# Patient Record
Sex: MALE | Race: WHITE | NOT HISPANIC OR LATINO | Employment: FULL TIME | URBAN - METROPOLITAN AREA
[De-identification: names, ages, dates, MRNs, and addresses within clinical notes are randomized per-mention and may not be internally consistent; named-entity substitution may affect disease eponyms.]

---

## 2017-01-09 ENCOUNTER — GENERIC CONVERSION - ENCOUNTER (OUTPATIENT)
Dept: OTHER | Facility: OTHER | Age: 62
End: 2017-01-09

## 2017-01-10 LAB — PROSTATE SPECIFIC ANTIGEN (HISTORICAL): 2.6 NG/ML (ref 0–4)

## 2017-02-13 ENCOUNTER — ALLSCRIPTS OFFICE VISIT (OUTPATIENT)
Dept: OTHER | Facility: OTHER | Age: 62
End: 2017-02-13

## 2017-02-15 ENCOUNTER — ANESTHESIA EVENT (OUTPATIENT)
Dept: GASTROENTEROLOGY | Facility: AMBULARY SURGERY CENTER | Age: 62
End: 2017-02-15
Payer: COMMERCIAL

## 2017-02-15 RX ORDER — ATORVASTATIN CALCIUM 40 MG/1
40 TABLET, FILM COATED ORAL
COMMUNITY
End: 2021-03-25 | Stop reason: HOSPADM

## 2017-02-15 RX ORDER — LANOLIN ALCOHOL/MO/W.PET/CERES
1 CREAM (GRAM) TOPICAL EVERY MORNING
COMMUNITY
End: 2018-10-08

## 2017-02-15 RX ORDER — MULTIVIT-MIN/IRON/FOLIC ACID/K 18-600-40
2000 CAPSULE ORAL
COMMUNITY

## 2017-02-15 RX ORDER — LANSOPRAZOLE 30 MG/1
30 CAPSULE, DELAYED RELEASE ORAL EVERY MORNING
COMMUNITY

## 2017-02-15 RX ORDER — LATANOPROST 50 UG/ML
1 SOLUTION/ DROPS OPHTHALMIC
COMMUNITY
End: 2019-05-07

## 2017-02-15 RX ORDER — LOSARTAN POTASSIUM 50 MG/1
50 TABLET ORAL EVERY MORNING
COMMUNITY
End: 2021-03-25 | Stop reason: HOSPADM

## 2017-02-16 ENCOUNTER — ANESTHESIA (OUTPATIENT)
Dept: GASTROENTEROLOGY | Facility: AMBULARY SURGERY CENTER | Age: 62
End: 2017-02-16
Payer: COMMERCIAL

## 2017-02-16 ENCOUNTER — HOSPITAL ENCOUNTER (OUTPATIENT)
Facility: AMBULARY SURGERY CENTER | Age: 62
Setting detail: OUTPATIENT SURGERY
Discharge: HOME | End: 2017-02-16
Attending: INTERNAL MEDICINE | Admitting: INTERNAL MEDICINE
Payer: COMMERCIAL

## 2017-02-16 VITALS
HEART RATE: 69 BPM | DIASTOLIC BLOOD PRESSURE: 85 MMHG | TEMPERATURE: 97.2 F | SYSTOLIC BLOOD PRESSURE: 165 MMHG | OXYGEN SATURATION: 96 % | HEIGHT: 66 IN | RESPIRATION RATE: 18 BRPM | BODY MASS INDEX: 28.93 KG/M2 | WEIGHT: 180 LBS

## 2017-02-16 DIAGNOSIS — Z86.010 HISTORY OF COLONIC POLYPS: ICD-10-CM

## 2017-02-16 DIAGNOSIS — K22.70 BARRETT'S ESOPHAGUS WITHOUT DYSPLASIA: ICD-10-CM

## 2017-02-16 LAB — GLUCOSE SERPL-MCNC: 107 MG/DL (ref 65–140)

## 2017-02-16 PROCEDURE — 82948 REAGENT STRIP/BLOOD GLUCOSE: CPT

## 2017-02-16 PROCEDURE — 88305 TISSUE EXAM BY PATHOLOGIST: CPT | Performed by: INTERNAL MEDICINE

## 2017-02-16 RX ORDER — SODIUM CHLORIDE, SODIUM LACTATE, POTASSIUM CHLORIDE, CALCIUM CHLORIDE 600; 310; 30; 20 MG/100ML; MG/100ML; MG/100ML; MG/100ML
100 INJECTION, SOLUTION INTRAVENOUS CONTINUOUS
Status: DISCONTINUED | OUTPATIENT
Start: 2017-02-16 | End: 2017-02-16 | Stop reason: HOSPADM

## 2017-02-16 RX ORDER — LABETALOL HYDROCHLORIDE 5 MG/ML
INJECTION, SOLUTION INTRAVENOUS AS NEEDED
Status: DISCONTINUED | OUTPATIENT
Start: 2017-02-16 | End: 2017-02-16 | Stop reason: SURG

## 2017-02-16 RX ORDER — PROPOFOL 10 MG/ML
INJECTION, EMULSION INTRAVENOUS AS NEEDED
Status: DISCONTINUED | OUTPATIENT
Start: 2017-02-16 | End: 2017-02-16 | Stop reason: SURG

## 2017-02-16 RX ORDER — LIDOCAINE HYDROCHLORIDE 20 MG/ML
INJECTION, SOLUTION EPIDURAL; INFILTRATION; INTRACAUDAL; PERINEURAL AS NEEDED
Status: DISCONTINUED | OUTPATIENT
Start: 2017-02-16 | End: 2017-02-16 | Stop reason: SURG

## 2017-02-16 RX ADMIN — PROPOFOL 40 MG: 10 INJECTION, EMULSION INTRAVENOUS at 11:22

## 2017-02-16 RX ADMIN — PROPOFOL 20 MG: 10 INJECTION, EMULSION INTRAVENOUS at 11:46

## 2017-02-16 RX ADMIN — PROPOFOL 20 MG: 10 INJECTION, EMULSION INTRAVENOUS at 11:44

## 2017-02-16 RX ADMIN — PROPOFOL 20 MG: 10 INJECTION, EMULSION INTRAVENOUS at 11:42

## 2017-02-16 RX ADMIN — PROPOFOL 20 MG: 10 INJECTION, EMULSION INTRAVENOUS at 11:40

## 2017-02-16 RX ADMIN — LABETALOL HYDROCHLORIDE 10 MG: 5 INJECTION, SOLUTION INTRAVENOUS at 11:24

## 2017-02-16 RX ADMIN — PROPOFOL 40 MG: 10 INJECTION, EMULSION INTRAVENOUS at 11:32

## 2017-02-16 RX ADMIN — PROPOFOL 20 MG: 10 INJECTION, EMULSION INTRAVENOUS at 11:36

## 2017-02-16 RX ADMIN — PROPOFOL 120 MG: 10 INJECTION, EMULSION INTRAVENOUS at 11:19

## 2017-02-16 RX ADMIN — SODIUM CHLORIDE, SODIUM LACTATE, POTASSIUM CHLORIDE, AND CALCIUM CHLORIDE: .6; .31; .03; .02 INJECTION, SOLUTION INTRAVENOUS at 11:14

## 2017-02-16 RX ADMIN — SODIUM CHLORIDE, SODIUM LACTATE, POTASSIUM CHLORIDE, AND CALCIUM CHLORIDE 100 ML/HR: .6; .31; .03; .02 INJECTION, SOLUTION INTRAVENOUS at 10:56

## 2017-02-16 RX ADMIN — LIDOCAINE HYDROCHLORIDE 40 MG: 20 INJECTION, SOLUTION EPIDURAL; INFILTRATION; INTRACAUDAL; PERINEURAL at 11:19

## 2017-02-16 RX ADMIN — PROPOFOL 20 MG: 10 INJECTION, EMULSION INTRAVENOUS at 11:34

## 2017-02-16 RX ADMIN — PROPOFOL 40 MG: 10 INJECTION, EMULSION INTRAVENOUS at 11:29

## 2017-02-16 RX ADMIN — PROPOFOL 40 MG: 10 INJECTION, EMULSION INTRAVENOUS at 11:26

## 2017-02-16 RX ADMIN — PROPOFOL 40 MG: 10 INJECTION, EMULSION INTRAVENOUS at 11:24

## 2017-02-16 RX ADMIN — PROPOFOL 20 MG: 10 INJECTION, EMULSION INTRAVENOUS at 11:38

## 2017-04-24 ENCOUNTER — TRANSCRIBE ORDERS (OUTPATIENT)
Dept: ADMINISTRATIVE | Facility: HOSPITAL | Age: 62
End: 2017-04-24

## 2017-04-24 DIAGNOSIS — S64.21XD: Primary | ICD-10-CM

## 2017-05-05 ENCOUNTER — HOSPITAL ENCOUNTER (OUTPATIENT)
Dept: NEUROLOGY | Facility: HOSPITAL | Age: 62
Discharge: HOME | End: 2017-05-05
Payer: COMMERCIAL

## 2017-05-05 DIAGNOSIS — S64.21XD: ICD-10-CM

## 2017-05-05 PROCEDURE — 95910 NRV CNDJ TEST 7-8 STUDIES: CPT

## 2017-05-05 PROCEDURE — 95886 MUSC TEST DONE W/N TEST COMP: CPT

## 2018-01-15 VITALS
WEIGHT: 187 LBS | BODY MASS INDEX: 27.7 KG/M2 | HEIGHT: 69 IN | DIASTOLIC BLOOD PRESSURE: 80 MMHG | SYSTOLIC BLOOD PRESSURE: 140 MMHG | HEART RATE: 76 BPM

## 2018-02-01 DIAGNOSIS — Z12.5 ENCOUNTER FOR SCREENING FOR MALIGNANT NEOPLASM OF PROSTATE: ICD-10-CM

## 2018-04-11 VITALS
WEIGHT: 182.6 LBS | BODY MASS INDEX: 27.05 KG/M2 | DIASTOLIC BLOOD PRESSURE: 78 MMHG | HEART RATE: 64 BPM | SYSTOLIC BLOOD PRESSURE: 144 MMHG | HEIGHT: 69 IN

## 2018-04-11 DIAGNOSIS — M25.511 RIGHT SHOULDER PAIN, UNSPECIFIED CHRONICITY: Primary | ICD-10-CM

## 2018-04-11 PROCEDURE — 99214 OFFICE O/P EST MOD 30 MIN: CPT | Performed by: ORTHOPAEDIC SURGERY

## 2018-04-11 RX ORDER — PRASUGREL 10 MG/1
TABLET, FILM COATED ORAL
COMMUNITY
Start: 2018-02-12 | End: 2019-05-07

## 2018-04-11 RX ORDER — FLUTICASONE PROPIONATE 50 MCG
2 SPRAY, SUSPENSION (ML) NASAL AS NEEDED
COMMUNITY
End: 2022-03-30

## 2018-04-11 RX ORDER — METOPROLOL SUCCINATE 25 MG/1
25 TABLET, EXTENDED RELEASE ORAL EVERY MORNING
COMMUNITY
Start: 2018-03-07

## 2018-04-11 RX ORDER — METFORMIN HYDROCHLORIDE 500 MG/1
TABLET, EXTENDED RELEASE ORAL
COMMUNITY
Start: 2018-02-05 | End: 2018-10-08 | Stop reason: SDUPTHER

## 2018-04-18 NOTE — TELEPHONE ENCOUNTER
I spoke with patient  He is going to contact [facility] and his ins to confirm that they are participating with his Ins  I did, earlier, change the facility to [facility] and I informed [facility] of this  He also asked me to cancel his appt  That is currently scheduled with [facility]

## 2018-04-18 NOTE — TELEPHONE ENCOUNTER
Caller- patients wife, Carmen Pal  - [phone]  Caller reports they received a call from [facility] in [place] about a script they received for her husbands MRI  She calls to report they cannot go to [place] with their [employer] insurance  Please follow up, the patient has questions  You may call the patient first [phone]

## 2018-04-25 VITALS
SYSTOLIC BLOOD PRESSURE: 131 MMHG | DIASTOLIC BLOOD PRESSURE: 70 MMHG | BODY MASS INDEX: 27.4 KG/M2 | WEIGHT: 185 LBS | HEART RATE: 71 BPM | HEIGHT: 69 IN

## 2018-04-25 DIAGNOSIS — M75.121 COMPLETE TEAR OF RIGHT ROTATOR CUFF: Primary | ICD-10-CM

## 2018-04-25 PROCEDURE — 99214 OFFICE O/P EST MOD 30 MIN: CPT | Performed by: ORTHOPAEDIC SURGERY
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
## 2018-06-06 VITALS
HEART RATE: 67 BPM | HEIGHT: 66 IN | WEIGHT: 181.6 LBS | DIASTOLIC BLOOD PRESSURE: 73 MMHG | BODY MASS INDEX: 29.18 KG/M2 | SYSTOLIC BLOOD PRESSURE: 146 MMHG

## 2018-06-06 DIAGNOSIS — M75.121 COMPLETE TEAR OF RIGHT ROTATOR CUFF: Primary | ICD-10-CM

## 2018-06-06 PROCEDURE — 99214 OFFICE O/P EST MOD 30 MIN: CPT | Performed by: ORTHOPAEDIC SURGERY
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
## 2018-06-26 ENCOUNTER — EVALUATION (OUTPATIENT)
Dept: PHYSICAL THERAPY | Facility: CLINIC | Age: 63
End: 2018-06-26
Payer: COMMERCIAL

## 2018-06-26 DIAGNOSIS — M75.121 COMPLETE TEAR OF RIGHT ROTATOR CUFF: Primary | ICD-10-CM

## 2018-06-26 PROCEDURE — G8985 CARRY GOAL STATUS: HCPCS

## 2018-06-26 PROCEDURE — G8986 CARRY D/C STATUS: HCPCS

## 2018-06-26 PROCEDURE — 97162 PT EVAL MOD COMPLEX 30 MIN: CPT

## 2018-06-26 PROCEDURE — G8984 CARRY CURRENT STATUS: HCPCS

## 2018-06-26 NOTE — PROGRESS NOTES
PT Evaluation     Today's date: 2018  Patient name: Leif Guerrero  : 1955  MRN: [id number]  Referring provider: Hermann Arreola MD  Dx:   Encounter Diagnosis     ICD-10-CM    1  Complete tear of right rotator cuff M75 121 Ambulatory referral to Physical Therapy                  Assessment  Impairments: abnormal muscle firing, abnormal or restricted ROM, activity intolerance, impaired physical strength, pain with function and scapular dyskinesis    Assessment details: Pt is a pleasant 58 y o  male who presents to [address] with R shoulder pain associated w/ traumatic RC tear  Today, he presents with decreased and painful R shoulder ROM, decreased R shoulder strength and stability, soft tissue density restrictions, and decreased tolerance to activity  Functionally, he is limited in his ability to lift, carry, reach, and pull w/ R arm  He has trouble sleeping on that  However, his motion and strength are better than expected for this condition, and he shows excellent form w/ all exercises today  Due to high insurance cost per visit, pt will follow up in two weeks after performing HEP in the mean time  Any further issues will be addressed at that time  Pt asked to call w/ issues prior to that  Pt will benefit from skilled PT to address the aforementioned deficits and limitations in an effort to maximize pain free functional mobility and overall quality of life  Progress as able with these goals in mind  Understanding of Dx/Px/POC: good   Prognosis: good    Goals  Short term goals (3 weeks):  1) Pt will improve R shoulder ROM deficits by 15-20 degrees pain free  2) Pt will improve R shoulder MMT testing by 1/3 grade MMT  3) Pt will report pain at worse <5/10  4) Pt will initiate and progress HEP w/ special emphasis on     Long term goals (6 weeks)  1) Pt will improve FOTO to at least 70   2) Pt will sleep through the night w/o waking due to pain     3) Pt will perform all self care, household, and work activities that require overhead motions w/ improved scapulohumeral kinematics and pain <3/10 throughout  4) Pt will be independent and compliant w/ HEP in order to maximize functional benefit of skilled PT following d/c        Plan  Patient would benefit from: skilled PT  Planned modality interventions: cryotherapy and thermotherapy: hydrocollator packs  Planned therapy interventions: abdominal trunk stabilization, therapeutic activities, therapeutic exercise, therapeutic training, graded motor, graded exercise, graded activity, patient education, postural training, neuromuscular re-education, manual therapy, joint mobilization, activity modification, ADL retraining, body mechanics training, home exercise program, stretching and strengthening  Frequency: 2x week  Duration in visits: 12  Duration in weeks: 6  Treatment plan discussed with: patient  Plan details: POC to include: DTM and MWM, shoulder and scap stab, functional lifting     HEP to start: GTB rows and ext, RTB IR/ER active, wall slides    Pt given GTB and RTB for home use           Subjective Evaluation    History of Present Illness  Mechanism of injury: Pt was pulling cord to start generator, felt pop in R shoulder an had pain immediately  MRI shows complete RC tear  Pt is trying to manage this non-operatively if able, is aware of risks associated with this  Reports that motion and pain have both improved  He is not lifting things away from his body, very aware of which motions cause pain  Pt has trouble lying on that side   Pt functional status is as follows:        Quality of life: good    Pain  Current pain ratin  At best pain ratin  At worst pain ratin  Location: deep in R shoulder, R lateral and anterior shoulder   Quality: sharp and knife-like  Relieving factors: rest  Aggravating factors: lifting and overhead activity  Progression: improved    Social Support  Steps to enter house: yes  Stairs in house: yes   Lives in: one-story house (ranch down to basement )  Lives with: spouse    Employment status: working (office work )  Hand dominance: right    Patient Goals  Patient goals for therapy: increased strength, decreased pain and increased motion  Patient goal: see if I need surgery         Objective     Postural Observations  Seated posture: fair  Standing posture: fair  Correction of posture: makes symptoms better        Palpation     Additional Palpation Details  Min TTP at insertion of R supraspinatus and subscap, min/mod increase in tissue density through R upper quarter     Cervical/Thoracic Screen   Cervical range of motion within normal limits    Neurological Testing     Sensation     Shoulder   Left Shoulder   Intact: light touch    Right Shoulder   Intact: light touch    Active Range of Motion     Right Shoulder   Flexion: 145 degrees   Abduction: 145 degrees     Additional Active Range of Motion Details  R IR to T 10, ER to C2  L IR to T4, ER to T1     Passive Range of Motion     Additional Passive Range of Motion Details  R shoulder grossly limited by pain not stiffness at end ranges of all motions, not pushed to end limits today       Scapular Mobility     Right Shoulder   Scapular mobility: good  Scapular Mobility with Shoulder to 90° FF   Upward rotation: delayed    Strength/Myotome Testing     Left Shoulder   Normal muscle strength    Right Shoulder     Planes of Motion   Flexion: 4   Extension: 4   Abduction: 4   External rotation at 0°: 4   Internal rotation at 0°: 4     Additional Strength Details  Min pain w/ abd and flex testing    B MT and LT grossly 4-/5 w/o pain     Tests     Additional Tests Details  Not performed s/t knowledge of diagnosis    MWM for flexion/scaption does not improve motion           Precautions complete RC tear, pt is on blood thinners     Specialty Daily Treatment Diary     Manual         MWM        DTM and TPR                                    Exercise Diary         Wall slides IR/ER        Rows/ext        scaption        PNF        Alternating isometrics        Body blade         Walk outs                                                                                                             Modalities        Heat pre        Ice post                  Update 7/10/2018: Pt called to say he will be cancelling all appointments due to high deductible  Would like to wait until having surgery until continuing  Will call w/ any future issues

## 2018-07-18 ENCOUNTER — OFFICE VISIT (OUTPATIENT)
Dept: OBGYN CLINIC | Facility: CLINIC | Age: 63
End: 2018-07-18
Payer: COMMERCIAL

## 2018-07-18 VITALS
HEART RATE: 76 BPM | WEIGHT: 180 LBS | DIASTOLIC BLOOD PRESSURE: 72 MMHG | BODY MASS INDEX: 26.66 KG/M2 | HEIGHT: 69 IN | SYSTOLIC BLOOD PRESSURE: 131 MMHG

## 2018-07-18 DIAGNOSIS — M75.121 COMPLETE TEAR OF RIGHT ROTATOR CUFF: Primary | ICD-10-CM

## 2018-07-18 PROCEDURE — 99214 OFFICE O/P EST MOD 30 MIN: CPT | Performed by: ORTHOPAEDIC SURGERY
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
## 2018-08-03 DIAGNOSIS — M75.121 COMPLETE TEAR OF RIGHT ROTATOR CUFF: Primary | ICD-10-CM

## 2018-08-07 LAB
APTT PPP: 29 SEC (ref 22–34)
BASOPHILS # BLD AUTO: 37 CELLS/UL (ref 0–200)
BASOPHILS NFR BLD AUTO: 0.6 %
BUN SERPL-MCNC: 22 MG/DL (ref 7–25)
BUN/CREAT SERPL: ABNORMAL (CALC) (ref 6–22)
CALCIUM SERPL-MCNC: 9.6 MG/DL (ref 8.6–10.3)
CHLORIDE SERPL-SCNC: 104 MMOL/L (ref 98–110)
CO2 SERPL-SCNC: 27 MMOL/L (ref 20–32)
CREAT SERPL-MCNC: 1.09 MG/DL (ref 0.7–1.25)
EOSINOPHIL # BLD AUTO: 186 CELLS/UL (ref 15–500)
EOSINOPHIL NFR BLD AUTO: 3 %
ERYTHROCYTE [DISTWIDTH] IN BLOOD BY AUTOMATED COUNT: 12.5 % (ref 11–15)
EST. AVERAGE GLUCOSE BLD GHB EST-MCNC: 148 (CALC)
EST. AVERAGE GLUCOSE BLD GHB EST-SCNC: 8.2 (CALC)
GLUCOSE SERPL-MCNC: 137 MG/DL (ref 65–99)
HBA1C MFR BLD: 6.8 % OF TOTAL HGB
HCT VFR BLD AUTO: 42.9 % (ref 38.5–50)
HGB BLD-MCNC: 14.2 G/DL (ref 13.2–17.1)
INR PPP: 1
LYMPHOCYTES # BLD AUTO: 1996 CELLS/UL (ref 850–3900)
LYMPHOCYTES NFR BLD AUTO: 32.2 %
MCH RBC QN AUTO: 30 PG (ref 27–33)
MCHC RBC AUTO-ENTMCNC: 33.1 G/DL (ref 32–36)
MCV RBC AUTO: 90.7 FL (ref 80–100)
MONOCYTES # BLD AUTO: 539 CELLS/UL (ref 200–950)
MONOCYTES NFR BLD AUTO: 8.7 %
NEUTROPHILS # BLD AUTO: 3441 CELLS/UL (ref 1500–7800)
NEUTROPHILS NFR BLD AUTO: 55.5 %
PLATELET # BLD AUTO: 184 THOUSAND/UL (ref 140–400)
PMV BLD REES-ECKER: 12.9 FL (ref 7.5–12.5)
POTASSIUM SERPL-SCNC: 4.4 MMOL/L (ref 3.5–5.3)
PROTHROMBIN TIME: 10.5 SEC (ref 9–11.5)
RBC # BLD AUTO: 4.73 MILLION/UL (ref 4.2–5.8)
SL AMB EGFR AFRICAN AMERICAN: 84 ML/MIN/1.73M2
SL AMB EGFR NON AFRICAN AMERICAN: 72 ML/MIN/1.73M2
SODIUM SERPL-SCNC: 139 MMOL/L (ref 135–146)
WBC # BLD AUTO: 6.2 THOUSAND/UL (ref 3.8–10.8)

## 2018-08-20 NOTE — PRE-PROCEDURE INSTRUCTIONS
My Surgical Experience    The following information was developed to assist you to prepare for your operation  What do I need to do before coming to the hospital?   Arrange for a responsible person to drive you to and from the hospital    Arrange care for your children at home  Children are not allowed in the recovery areas of the hospital   Plan to wear clothing that is easy to put on and take off  If you are having shoulder surgery, wear a shirt that buttons or zippers in the front  Bathing  o Shower the evening before and the morning of your surgery with an antibacterial soap  Please refer to the Pre Op Showering Instructions for Surgery Patients Sheet   o Remove nail polish and all body piercing jewelry  o Do not shave any body part for at least 24 hours before surgery-this includes face, arms, legs and upper body  Food  o Nothing to eat or drink after midnight the night before your surgery  This includes candy and chewing gum  o Exception: If your surgery is after 12:00pm (noon), you may have clear liquids such as 7-Up®, ginger ale, apple or cranberry juice, Jell-O®, water, or clear broth until 8:00 am  o Do not drink milk or juice with pulp on the morning before surgery  o Do not drink alcohol 24 hours before surgery  Medicine  o Follow instructions you received from your surgeon about which medicines you may take on the day of surgery  o If instructed to take medicine on the morning of surgery, take pills with just a small sip of water  Call your prescribing doctor for specific infroamtion on what to do if you take insulin    What should I bring to the hospital?    Bring:  Rollo Bong or a walker, if you have them, for foot or knee surgery   A list of the daily medicines, vitamins, minerals, herbals and nutritional supplements you take   Include the dosages of medicines and the time you take them each day   Glasses, dentures or hearing aids   Minimal clothing; you will be wearing hospital sleepwear   Photo ID; required to verify your identity   If you have a Living Will or Power of , bring a copy of the documents   If you have an ostomy, bring an extra pouch and any supplies you use    Do not bring   Medicines or inhalers   Money, valuables or jewelry    What other information should I know about the day of surgery?  Notify your surgeons if you develop a cold, sore throat, cough, fever, rash or any other illness   Report to the Ambulatory Surgical/Same Day Surgery Unit   You will be instructed to stop at Registration only if you have not been pre-registered   Inform your  fi they do not stay that they will be asked by the staff to leave a phone number where they can be reached   Be available to be reached before surgery  In the event the operating room schedule changes, you may be asked to come in earlier or later than expected    *It is important to tell your doctor and others involved in your health care if you are taking or have been taking any non-prescription drugs, vitamins, minerals, herbals or other nutritional supplements  Any of these may interact with some food or medicines and cause a reaction      Pre-Surgery Instructions:   Medication Instructions    ASPIRIN 81 PO Instructed patient per Anesthesia Guidelines   atorvastatin (LIPITOR) 40 mg tablet Instructed patient per Anesthesia Guidelines   Cholecalciferol (VITAMIN D) 2000 UNITS CAPS Instructed patient per Anesthesia Guidelines   fexofenadine (ALLEGRA) 180 MG tablet Instructed patient per Anesthesia Guidelines   fluticasone (FLONASE) 50 mcg/act nasal spray Instructed patient per Anesthesia Guidelines   GLUCOSAMINE CHONDROITIN COMPLX PO Instructed patient per Anesthesia Guidelines   lansoprazole (PREVACID) 30 mg capsule Instructed patient per Anesthesia Guidelines   losartan (COZAAR) 50 mg tablet Instructed patient per Anesthesia Guidelines      metFORMIN (GLUCOPHAGE) 1000 MG tablet Instructed patient per Anesthesia Guidelines   metoprolol succinate (TOPROL-XL) 25 mg 24 hr tablet Instructed patient per Anesthesia Guidelines      To take losartan, metoprolol and lansoprazole a m of surgery

## 2018-08-21 ENCOUNTER — OFFICE VISIT (OUTPATIENT)
Dept: HEMATOLOGY ONCOLOGY | Facility: MEDICAL CENTER | Age: 63
End: 2018-08-21
Payer: COMMERCIAL

## 2018-08-21 ENCOUNTER — TELEPHONE (OUTPATIENT)
Dept: OBGYN CLINIC | Facility: CLINIC | Age: 63
End: 2018-08-21

## 2018-08-21 VITALS
HEART RATE: 67 BPM | HEIGHT: 69 IN | BODY MASS INDEX: 26.81 KG/M2 | DIASTOLIC BLOOD PRESSURE: 80 MMHG | RESPIRATION RATE: 18 BRPM | SYSTOLIC BLOOD PRESSURE: 160 MMHG | WEIGHT: 181 LBS | OXYGEN SATURATION: 99 % | TEMPERATURE: 97.1 F

## 2018-08-21 DIAGNOSIS — D68.51 FACTOR 5 LEIDEN MUTATION, HETEROZYGOUS (HCC): Primary | ICD-10-CM

## 2018-08-21 PROCEDURE — 99204 OFFICE O/P NEW MOD 45 MIN: CPT | Performed by: INTERNAL MEDICINE

## 2018-08-21 NOTE — TELEPHONE ENCOUNTER
This should be fine   The note from Dr Mathew Ayala was also seen that he should start aspirin and Xarelto post-operatively for DVT prophylaxis
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
## 2018-08-22 ENCOUNTER — ANESTHESIA EVENT (OUTPATIENT)
Dept: PERIOP | Facility: HOSPITAL | Age: 63
End: 2018-08-22
Payer: COMMERCIAL

## 2018-08-23 ENCOUNTER — HOSPITAL ENCOUNTER (OUTPATIENT)
Facility: HOSPITAL | Age: 63
Setting detail: OUTPATIENT SURGERY
Discharge: HOME | End: 2018-08-23
Attending: ORTHOPAEDIC SURGERY | Admitting: ORTHOPAEDIC SURGERY
Payer: COMMERCIAL

## 2018-08-23 ENCOUNTER — ANESTHESIA (OUTPATIENT)
Dept: PERIOP | Facility: HOSPITAL | Age: 63
End: 2018-08-23
Payer: COMMERCIAL

## 2018-08-23 VITALS
OXYGEN SATURATION: 96 % | HEART RATE: 66 BPM | HEIGHT: 69 IN | RESPIRATION RATE: 18 BRPM | BODY MASS INDEX: 26.81 KG/M2 | SYSTOLIC BLOOD PRESSURE: 159 MMHG | WEIGHT: 181 LBS | DIASTOLIC BLOOD PRESSURE: 82 MMHG | TEMPERATURE: 97.3 F

## 2018-08-23 LAB — GLUCOSE SERPL-MCNC: 133 MG/DL (ref 65–140)

## 2018-08-23 PROCEDURE — 29827 SHO ARTHRS SRG RT8TR CUF RPR: CPT | Performed by: PHYSICIAN ASSISTANT

## 2018-08-23 PROCEDURE — 29826 SHO ARTHRS SRG DECOMPRESSION: CPT | Performed by: PHYSICIAN ASSISTANT

## 2018-08-23 PROCEDURE — 29826 SHO ARTHRS SRG DECOMPRESSION: CPT | Performed by: ORTHOPAEDIC SURGERY

## 2018-08-23 PROCEDURE — C1713 ANCHOR/SCREW BN/BN,TIS/BN: HCPCS | Performed by: ORTHOPAEDIC SURGERY

## 2018-08-23 PROCEDURE — 29819 SHO ARTHRS SRG RMVL LOOSE/FB: CPT | Performed by: ORTHOPAEDIC SURGERY

## 2018-08-23 PROCEDURE — 29827 SHO ARTHRS SRG RT8TR CUF RPR: CPT | Performed by: ORTHOPAEDIC SURGERY

## 2018-08-23 PROCEDURE — 82948 REAGENT STRIP/BLOOD GLUCOSE: CPT

## 2018-08-23 DEVICE — BIO-COMP SWVLK C, CLD 4.75X19.1MM
Type: IMPLANTABLE DEVICE | Site: SHOULDER | Status: FUNCTIONAL
Brand: ARTHREX®

## 2018-08-23 DEVICE — SPEEDBRG IMP SYS W/BIO-COMP SWVLK
Type: IMPLANTABLE DEVICE | Site: SHOULDER | Status: FUNCTIONAL
Brand: ARTHREX®

## 2018-08-23 RX ORDER — ROPIVACAINE HYDROCHLORIDE 5 MG/ML
INJECTION, SOLUTION EPIDURAL; INFILTRATION; PERINEURAL AS NEEDED
Status: DISCONTINUED | OUTPATIENT
Start: 2018-08-23 | End: 2018-08-23 | Stop reason: SURG

## 2018-08-23 RX ORDER — FENTANYL CITRATE/PF 50 MCG/ML
50 SYRINGE (ML) INJECTION
Status: DISCONTINUED | OUTPATIENT
Start: 2018-08-23 | End: 2018-08-23 | Stop reason: HOSPADM

## 2018-08-23 RX ORDER — GLYCOPYRROLATE 0.2 MG/ML
INJECTION INTRAMUSCULAR; INTRAVENOUS AS NEEDED
Status: DISCONTINUED | OUTPATIENT
Start: 2018-08-23 | End: 2018-08-23 | Stop reason: SURG

## 2018-08-23 RX ORDER — ONDANSETRON 2 MG/ML
INJECTION INTRAMUSCULAR; INTRAVENOUS AS NEEDED
Status: DISCONTINUED | OUTPATIENT
Start: 2018-08-23 | End: 2018-08-23 | Stop reason: SURG

## 2018-08-23 RX ORDER — PROPOFOL 10 MG/ML
INJECTION, EMULSION INTRAVENOUS AS NEEDED
Status: DISCONTINUED | OUTPATIENT
Start: 2018-08-23 | End: 2018-08-23 | Stop reason: SURG

## 2018-08-23 RX ORDER — SODIUM CHLORIDE, SODIUM LACTATE, POTASSIUM CHLORIDE, CALCIUM CHLORIDE 600; 310; 30; 20 MG/100ML; MG/100ML; MG/100ML; MG/100ML
125 INJECTION, SOLUTION INTRAVENOUS CONTINUOUS
Status: DISCONTINUED | OUTPATIENT
Start: 2018-08-23 | End: 2018-08-23 | Stop reason: HOSPADM

## 2018-08-23 RX ORDER — MEPERIDINE HYDROCHLORIDE 25 MG/ML
12.5 INJECTION INTRAMUSCULAR; INTRAVENOUS; SUBCUTANEOUS
Status: DISCONTINUED | OUTPATIENT
Start: 2018-08-23 | End: 2018-08-23 | Stop reason: HOSPADM

## 2018-08-23 RX ORDER — LABETALOL HYDROCHLORIDE 5 MG/ML
10 INJECTION, SOLUTION INTRAVENOUS AS NEEDED
Status: DISCONTINUED | OUTPATIENT
Start: 2018-08-23 | End: 2018-08-23 | Stop reason: HOSPADM

## 2018-08-23 RX ORDER — MIDAZOLAM HYDROCHLORIDE 1 MG/ML
INJECTION INTRAMUSCULAR; INTRAVENOUS AS NEEDED
Status: DISCONTINUED | OUTPATIENT
Start: 2018-08-23 | End: 2018-08-23

## 2018-08-23 RX ORDER — MIDAZOLAM HYDROCHLORIDE 1 MG/ML
INJECTION INTRAMUSCULAR; INTRAVENOUS AS NEEDED
Status: DISCONTINUED | OUTPATIENT
Start: 2018-08-23 | End: 2018-08-23 | Stop reason: SURG

## 2018-08-23 RX ORDER — PROMETHAZINE HYDROCHLORIDE 25 MG/ML
12.5 INJECTION, SOLUTION INTRAMUSCULAR; INTRAVENOUS ONCE AS NEEDED
Status: DISCONTINUED | OUTPATIENT
Start: 2018-08-23 | End: 2018-08-23 | Stop reason: HOSPADM

## 2018-08-23 RX ORDER — ONDANSETRON 2 MG/ML
4 INJECTION INTRAMUSCULAR; INTRAVENOUS ONCE AS NEEDED
Status: DISCONTINUED | OUTPATIENT
Start: 2018-08-23 | End: 2018-08-23 | Stop reason: HOSPADM

## 2018-08-23 RX ORDER — PROPOFOL 10 MG/ML
INJECTION, EMULSION INTRAVENOUS AS NEEDED
Status: DISCONTINUED | OUTPATIENT
Start: 2018-08-23 | End: 2018-08-23

## 2018-08-23 RX ADMIN — SODIUM CHLORIDE, SODIUM LACTATE, POTASSIUM CHLORIDE, AND CALCIUM CHLORIDE 125 ML/HR: .6; .31; .03; .02 INJECTION, SOLUTION INTRAVENOUS at 09:43

## 2018-08-23 RX ADMIN — MIDAZOLAM HYDROCHLORIDE 2 MG: 1 INJECTION, SOLUTION INTRAMUSCULAR; INTRAVENOUS at 11:00

## 2018-08-23 RX ADMIN — ROPIVACAINE HYDROCHLORIDE 30 ML: 5 INJECTION, SOLUTION EPIDURAL; INFILTRATION; PERINEURAL at 11:00

## 2018-08-23 RX ADMIN — ONDANSETRON 4 MG: 2 INJECTION INTRAMUSCULAR; INTRAVENOUS at 11:44

## 2018-08-23 RX ADMIN — CEFAZOLIN SODIUM 2000 MG: 2 SOLUTION INTRAVENOUS at 11:23

## 2018-08-23 RX ADMIN — GLYCOPYRROLATE 0.2 MG: 0.2 INJECTION, SOLUTION INTRAMUSCULAR; INTRAVENOUS at 11:44

## 2018-08-23 RX ADMIN — PROPOFOL 200 MG: 10 INJECTION, EMULSION INTRAVENOUS at 11:26

## 2018-08-23 NOTE — DISCHARGE INSTRUCTIONS
? [address] [place] [address]  ? Continuous Nerve Block Catheter Post-Operative Discharge Instructions  ? ? Your anesthesiologist has performed a regional nerve block and placed a catheter with a pain pump to supplement your pain control after surgery  This may not take away all of your pain  You may take additional oral medications as prescribed by your surgeon  The following is a brief overview of the instructions you and your family received at the time of your discharge  ?   ? Overview:  ? The pain pump will not cause nausea or sleepiness  It contains a numbing medication, ropivacaine  The amount of pain relief patients get from their catheters will vary significantly from patient to patient  After the first 12-18 hours your arm will not be is not as numb as when you left the hospital  At this time you may get soreness and some movement returning  If you begin to have pain or soreness while your pump is active, you should start taking the prescription pain medication your surgeon has written for you  It is very important to stay ahead of your pain  Your pain pump is designed to work in conjunction with your oral pain medication  Always remember to fill this medication after leaving the hospital at your local pharmacy  It is also recommended that prior to going to sleep following your surgery, you should take this oral medication even if you have no pain  This may prevent you from awakening in significant pain should the block become less numb while you sleep  ?   ? If you have questions about your catheter and pain pump after you leave the hospital, they can be addressed in one of three ways:  ? 1  When the hospital and/or anesthesiologist calls you automatically the first day  after your surgery and questions may be answered then  ? 2    Call [facility] 24-hr support line at [phone] (this will connect you to an on-call nurse who is specifically trained to deal with any problems the catheter or   pump may have)  ? 3  Call Christian Vanessa at [phone] and ask to be connected to the  on-call anesthesiologist   ?   ? Protect your numb arm from accidental injury, including from pressure, heat or cold  ?   ? Please follow these instructions until 24 hours after of the catheter is removed:  ? Keep your arm in a sling unless doing physical therapy  Keep all pressure off your elbow  ? YOU MAY NOT DRIVE  ?   ? Pump Operation:  ? If there is any leakage from the bandage covering the area where the catheter goes into your skin, simply apply a dry dressing over the one that is wet  Always leave the original dressing intact, replacing top dressing as needed  ? If you have any of the following symptoms: numb lips, ringing in your ears, metallic taste in your mouth, severe dizziness, blurred vision or slurred speech, clamp off the pain pump and have someone drive you to the nearest hospital ER or call 911  Some normal side effects of an arm catheter can be a horse voice, a sagging eyelid with eye redness, or mild shortness of breath  You do not need to come to the hospital if you have these side effects  ?   ? Removal of catheter and pump:  When the ball of medicine is empty and the numbness has worn off (approximately two or three days after your surgery) you MUST remove the catheter and discard the pump  Remove the dressing covering your catheter and slowly, but steadily, pull the catheter out of the skin  It should come out easily and painlessly  Once removed, cover the site with the Band-Aid, and throw the catheter and dressing out with the regular trash  DO NOT CUT THE CATHETER FOR ANY REASON  Instruction Sheet Following RTC repair     Sling:   Wear your sling at all times after your surgery (this includes sleeping), except for when you are doing pendulum exercises, showering, or physical therapy   Additionally, you should not carry anything heavier than a pencil in your hand  Dressing:   Remove all cotton and yellow gauze 48 hours after your surgery  You do not need to put a new dressing on your wound; place Band-Aids on your wound  Showering: You may shower 48 hours after surgery  Please use CAUTION!! Be careful not to slip and fall  The effects of anesthesia and/or medication may make you drowsy or light-headed  Do not soak in a bathtub, hot tub, or pool until the doctor tells you it is O K , to do so  Once you are done showering pat the wound dry and apply a Band-Aid  While in the shower you must keep the arm across the front of the body as if it were still in the sling  Sleeping:   You will most likely have difficulty sleeping in the first few weeks after surgery  Most people find it more comfortable to sleep in a reclining position  You can either sleep in a recliner chair or create this position with pillows  Ice:   You can ice the shoulder to reduce swelling and discomfort  Do not ice the shoulder more than 20 minutes at a time  Let the shoulder warm up before reapplication  Avoid getting you wound wet  If you have a Cryocuff you may keep this on continuously  Follow-up visit:   You need to see the doctor about one week following surgery for your first post-op visit  At that time your sutures (stitches) will be removed  You will be given a prescription to begin physical therapy if you were not already given one  Common concerns:   Bruising and/or swelling of the shoulder, arm, or hand are common after surgery  To relieve this discomfort it is best to ice the shoulder  Please call if:   1  Any oozing or redness of the wound, fevers (>101 3°F), or chills  2  Any difficulty breathing or heaviness in the chest      REMEMBER - these are only guidelines for what to expect  If you have any questions or concerns, please do not hesitate to call the office  [phone]

## 2018-08-23 NOTE — OP NOTE
OPERATIVE REPORT  PATIENT NAME: Javi Case    :  1955  MRN: [id number]  Pt Location: [facility] OR ROOM 02    SURGERY DATE: 2018    Surgeon(s) and Role:     * Wayne Chan MD - Primary     * Coco Valdez PA-C - Assisting necessary for the procedure for assistance with minimally invasive arthroscopic techniques for rotator cuff repair and decompression and removal of loose bodies    Preop Diagnosis:  Complete tear of right rotator cuff [M75 121]    Post-Op Diagnosis Codes:     * Complete tear of right rotator cuff [M75 121] of both subscapularis and supraspinatus tendons and subacromial impingement as well as multiple loose bodies    Procedure(s) (LRB):  RIGHT SHOULDER REPAIR ROTATOR CUFF  ARTHROSCOPIC, SUACROMIAL DECOMPRESION, REMOVAL LOOSE BODY (Right) with the subscapularis repair utilizing a speed fix technique with 1 anchor and 1 FiberTape suture and the supraspinatus tendon utilizing a speed bridge technique with 3 anchors and 5 sutures    Specimen(s):  * No specimens in log *    Estimated Blood Loss:   Minimal    Drains:       Anesthesia Type:   General plus regional    Operative Indications:  Complete tear of right rotator cuff [M75 121]  Sigifredo Rudolph is a 60-year-old male has been suffering with right shoulder pain and weakness and was verified on imaging to have a right shoulder rotator cuff tear  He understood the risks and benefits of a right shoulder arthroscopy with rotator cuff repair and wished to go ahead  The risks are inclusive of but not limited to infection, stiffness, worsening of symptoms, failure to regain full strength and ability, failure of the operation to provide anticipated results, recurrent tear, and need for further surgery  Operative Findings:  Right shoulder with good range of motion exam under anesthesia achieving forward flexion and abduction each to 160° with external rotation to 70° internal rotation is 60°    Intra-articular findings demonstrated good appearance of articular cartilage in the glenohumeral joint with no loose bodies and the axillary pouch  We did however notice in the subacromial space at there were 2 loose bodies with 1 measuring 12 x 7 mm and 1 measuring 10 x 5 mm which were both removed  One appeared to be off of the acromion and the other was difficult to tell where its donor site was located  The long head of biceps tendon was intact as was the superior labrum as well as the anterior and posterior labrum  The subscapularis tendon had an upper border tear requiring fixation which came together very nicely with use of the speed fix technique  Supraspinatus tendon did have an anterior to posterior complete tear with some mild retraction  We were able to bring this down to bleeding bed of bone with use of the speed bridge technique  It should be noted however that there was a cyst along the more anterior aspect of the greater tuberosity that did not allow an anchored replaced in that position  Thus, we did utilize 3 anchors for this repair  Complications: We did have to remove the anterior lateral row anchor as this was not solidly fixated into the greater tuberosity as there appeared to be a significant cyst or other void in the bone that would not allow purchase for of the 4 75 mm SwiveLock or the 5 5 mm SwiveLock anchor  We did however have excellent fixation of the 3 other anchors for this particular repair which gave us great coverage of a bleeding bed of bone and solid repair  Procedure and Technique:  Johan Fuentes was taken to the operating room and placed supine on the OR table  He was given preoperative regional anesthesia and IV antibiotics by the attending anesthesiologist   General anesthesia was induced and he was brought comfortably and safely into the beach chair position with all parts well padded and head in neutral position in the head lopez    The right shoulder was taken through exam under anesthesia as described above   Right upper extremity was then prepped and draped in the usual sterile fashion  A surgical time-out was taken  We did create a posterior portal with 11 blade  Diagnostic arthroscopy was begun and an anterior portal was made in the rotator interval with 11 blade  We did demonstrate good appearance of articular cartilage in the glenohumeral joint with no loose bodies noted initially however we did eventually note that there were 2 loose bodies in the subacromial space later in the case which were removed  The superior and anterior and posterior labrum were all intact with an intact long head of biceps tendon  The subscapularis tendon did have a tear along its upper border which required repair  We did make a lateral portal with 11 blade  We did demonstrate a complete tear of the supraspinatus tendon with some mild retraction  We did debride the tuberosity to a bleeding bed of bone with a shaver and a bur  We then placed the FiberTape suture into the subscapularis tendon in a horizontal mattress fashion and then prepared the bone along the lesser tuberosity and placed a SwiveLock anchor into the bone at this juncture  We had good fixation of the subscapularis tendon at this point  We removed excess suture  We then went to the subacromial space where we demonstrated 2 loose bodies  We did remove these with a grasper  One of them was somewhat adherent to the acromion  These did however appeared to be simple bony pieces related to arthritic change  We did find indeed a subacromial spur that was type 3 and did utilize a bur for an acromioplasty to decompress this region  We then began repair of the supraspinatus tendon  We placed an anterior medial row anchor and passed the FiberTape and FiberWire sutures and horizontal mattress fashion from that anchor  We then placed and a posterior medial row anchor and passed the FiberTape and FiberWire sutures from that anchor in standard fashion   These were in horizontal mattress configuration  We then began our fixation along the anterolateral aspect and did demonstrate that there was a void or cyst in the bone in this instance  We 1st tried to fixate the FiberTape sutures to this region with a 4 75 mm SwiveLock but this failed  We then placed a 5 5 mm SwiveLock which was slightly better however this also failed  Thus, we removed these anchors and did not attempt to place another anchor into the poor bony bed along the lateral aspect on the anterior portion of the greater tuberosity  We did however find better quality bone along the posterior lateral aspect of the tuberosity and we were able to place the 4 75 mm SwiveLock in this region with the 2 FiberTape sutures from the medial row  We then took the FiberWire suture from that anchor and placed that into the more anterior aspect of the rotator cuff tear and tied that down with arthroscopic knot tying techniques  At this point we already had good fixation onto the tuberosity  We did then tied down with arthroscopic knot tying techniques the previously passed medial row horizontal mattress sutures from the FiberWire sutures  This gave us additional fixation  We did probed the repair and found to be very nicely stable  We then irrigated the space and removed the arthroscopic equipment  Excess suture was removed  We then closed the portals with 4-0 nylon suture  Dry, sterile dressings were applied with a sling  He tolerated the procedure well and transferred to recovery room in stable condition  He will follow up with me in 1 week for suture removal   Due to the poor quality of his bone, I believe we should keep him in his sling for 6 weeks and not start physical therapy over that time either  We will certainly discuss this his postoperative appointment to see how he is doing     I was present for the entire procedure and A qualified resident physician was not available    Patient Disposition:  PACU SIGNATURE: Alma Duron MD  DATE: August 23, 2018  TIME: 12:38 PM

## 2018-08-23 NOTE — ANESTHESIA PREPROCEDURE EVALUATION
Review of Systems/Medical History  Patient summary reviewed  Chart reviewed  History of anesthetic complications PONV    Cardiovascular  EKG reviewed, Hyperlipidemia, Hypertension controlled, Valvular heart disease , tricuspid insufficiency and tricuspid regurgitation, CAD , Cardiac stents drug eluting   Comment: Cleared for surgery by cardiologist,  Pulmonary  Sleep apnea CPAP,        GI/Hepatic    GERD well controlled,  Hiatal hernia,        Prostatic disorder, benign prostatic hyperplasia       Endo/Other  Diabetes well controlled type 2 Oral agent,      GYN       Hematology      Comment: Factor V Leiden mutation Musculoskeletal  Back pain , lumbar pain,        Neurology  Negative neurology ROS      Psychology   Negative psychology ROS              Physical Exam    Airway    Mallampati score: II  TM Distance: >3 FB  Neck ROM: full     Dental   No notable dental hx     Cardiovascular  Cardiovascular exam normal    Pulmonary  Pulmonary exam normal     Other Findings        Anesthesia Plan  ASA Score- 3     Anesthesia Type- regional and general with ASA Monitors  Additional Monitors:   Airway Plan: LMA  Plan Factors-    Induction- intravenous  Postoperative Plan-     Informed Consent- Anesthetic plan and risks discussed with patient  I personally reviewed this patient with the CRNA  Discussed and agreed on the Anesthesia Plan with the CRNA  Nazia Abraham

## 2018-08-23 NOTE — PERIOPERATIVE NURSING NOTE
Discharge outcomes met-dressings dry and intact-sling in place-fingers warm-moving slightly-denies pain-tolerated fluids-On Q cath secure

## 2018-08-23 NOTE — ANESTHESIA POSTPROCEDURE EVALUATION
Post-Op Assessment Note      CV Status:  Stable    Mental Status:  Alert and awake    Hydration Status:  Stable    PONV Controlled:  Controlled    Airway Patency:  Patent and adequate    Post Op Vitals Reviewed: Yes          Staff: CRNA           /88 (08/23/18 1307)    Temp (!) 97 3 °F (36 3 °C) (08/23/18 1307)    Pulse 86 (08/23/18 1307)   Resp 18 (08/23/18 1307)    SpO2 95 % (08/23/18 1307)
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
## 2018-08-23 NOTE — PERIOPERATIVE NURSING NOTE
Reviewed discharge instructions and On Q instructions  with pt and wife  All dsgs dry and intact-sling in place-denies pain-exposed fingers warm-moving slightly

## 2018-08-28 ENCOUNTER — TELEPHONE (OUTPATIENT)
Dept: OBGYN CLINIC | Facility: CLINIC | Age: 63
End: 2018-08-28

## 2018-08-28 NOTE — TELEPHONE ENCOUNTER
Abel Serrano is five days s/p right shoulder rotator cuff repair, he states that he is still experiencing a lot of pain which is causing him to be unable to sleep  Patient was calling to request a re-fill for pain medication or something to help him sleep  Please advise

## 2018-08-31 ENCOUNTER — OFFICE VISIT (OUTPATIENT)
Dept: OBGYN CLINIC | Facility: CLINIC | Age: 63
End: 2018-08-31

## 2018-08-31 VITALS
WEIGHT: 178 LBS | DIASTOLIC BLOOD PRESSURE: 74 MMHG | BODY MASS INDEX: 26.36 KG/M2 | SYSTOLIC BLOOD PRESSURE: 152 MMHG | HEART RATE: 69 BPM | HEIGHT: 69 IN

## 2018-08-31 DIAGNOSIS — Z98.890 STATUS POST RIGHT ROTATOR CUFF REPAIR: ICD-10-CM

## 2018-08-31 DIAGNOSIS — M75.121 COMPLETE TEAR OF RIGHT ROTATOR CUFF: Primary | ICD-10-CM

## 2018-08-31 DIAGNOSIS — Z98.890 STATUS POST SUBACROMIAL DECOMPRESSION: ICD-10-CM

## 2018-08-31 PROCEDURE — 99024 POSTOP FOLLOW-UP VISIT: CPT | Performed by: ORTHOPAEDIC SURGERY

## 2018-08-31 RX ORDER — OXYCODONE HYDROCHLORIDE 5 MG/1
TABLET ORAL
COMMUNITY
Start: 2018-08-30 | End: 2018-10-08
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
## 2018-09-06 ENCOUNTER — EVALUATION (OUTPATIENT)
Dept: PHYSICAL THERAPY | Facility: CLINIC | Age: 63
End: 2018-09-06
Payer: COMMERCIAL

## 2018-09-06 DIAGNOSIS — M75.121 COMPLETE ROTATOR CUFF TEAR OR RUPTURE OF RIGHT SHOULDER, NOT SPECIFIED AS TRAUMATIC: Primary | ICD-10-CM

## 2018-09-06 PROCEDURE — G8984 CARRY CURRENT STATUS: HCPCS | Performed by: PHYSICAL THERAPIST

## 2018-09-06 PROCEDURE — G8985 CARRY GOAL STATUS: HCPCS | Performed by: PHYSICAL THERAPIST

## 2018-09-06 PROCEDURE — 97162 PT EVAL MOD COMPLEX 30 MIN: CPT | Performed by: PHYSICAL THERAPIST

## 2018-09-06 NOTE — PROGRESS NOTES
PT Evaluation     Today's date: 2018  Patient name: Mariaelena Enriquez  : 1955  MRN: [id number]  Referring provider: Ford Davenport MD  Dx:   Encounter Diagnosis     ICD-10-CM    1  Complete rotator cuff tear or rupture of right shoulder, not specified as traumatic M75 121        Start Time: 1100  Stop Time: 1145  Total time in clinic (min): 45 minutes    Assessment  Impairments: abnormal muscle tone, abnormal or restricted ROM, activity intolerance, impaired physical strength, lacks appropriate home exercise program, pain with function, scapular dyskinesis and poor posture     Assessment details: Mariaelena Enriquez is a 58 y o  male who presents to physical therapy with pain, decreased UE range of motion, decreased UE strength, impaired function, decreased activity tolerance and fair posture  Patient's clinical presentation is consistent with their referring diagnosis of Complete rotator cuff tear or rupture of right shoulder, not specified as traumatic  (primary encounter diagnosis)  The pt presents with functional limitations of ADLs, recreational activities, performing household chores, self-care and reaching  Pt would benefit from physical therapy services to address these limitations and maximize function  Pt was instructed and educated on home exercise program today and demonstrates understanding  Understanding of Dx/Px/POC: good   Prognosis: good    Goals  Short term goals:  (6 weeks)  1  Patient will have pain level 2/10 right shoulder at rest  2  Patient will report a 50% improvement in symptoms with ADL's  3  Patient will demonstrate appropriate scapulohumeral rhythm with reaching shoulder height and below  4  Patient will have improved right shoulder ROM by 20 degrees    Long term goals: (12 weeks)  1  Patient will report 85 % improvement improvement in symptoms with ADL's  2  Patient will have pain level 2/10 right shoulder with ADL's   3   Patient will demonstrate appropriate scapulohumeral rhythm with reaching overhead  4  Patient will be independent in a comprehensive home exercise program      Plan  Patient would benefit from: PT eval and skilled physical therapy  Planned modality interventions: cryotherapy and thermotherapy: hydrocollator packs  Planned therapy interventions: joint mobilization, massage, ADL training, neuromuscular re-education, patient education, postural training, strengthening, stretching, therapeutic exercise, home exercise program and functional ROM exercises  Frequency: 2x week  Duration in visits: 20  Duration in weeks: 10  Treatment plan discussed with: patient        Subjective Evaluation    History of Present Illness  Date of surgery: 2018  Mechanism of injury: He reports pain Right shld for the last 3 years  In 2018 he was starting a generator and on the last pull had significant pain  He followed up with Dr Caesar Thomson and an MRI was ordered  It indicated Right RC tear  He underwent Right RC repair on 18  He has now been referred to PT  Pain  Current pain rating: 3  At best pain rating: 3  At worst pain ratin  Quality: dull ache, throbbing and sharp  Relieving factors: medications    Social Support    Employment status: working ( for a law firm)  Hand dominance: right  Exercise history: Fish ,Golf, Hunt    Patient Goals  Patient goals for therapy: return to sport/leisure activities, independence with ADLs/IADLs and decreased pain          Objective     Postural Observations  Seated posture: fair        Observations     Additional Observation Details  3 portals Right shoulder with steri strips in place  There is no drainage and no sign of infection  Palpation     Right   Hypertonic in the levator scapulae, rhomboids and upper trapezius  Tenderness of the levator scapulae, rhomboids and upper trapezius       Neurological Testing     Sensation     Shoulder     Right Shoulder   Intact: light touch    Passive Range of Motion   Left Shoulder   Flexion: 170 degrees   Abduction: 170 degrees   External rotation 0°: 78 degrees   Internal rotation 0°: 90 degrees     Right Shoulder   Flexion: 26 degrees   External rotation 0°: -19 degrees   Internal rotation 0°: 55 degrees     Additional Passive Range of Motion Details  Right ABD not assessed due to surgical restrictions    Scapular Mobility     Right Shoulder   Scapular mobility: poor    Strength/Myotome Testing     Left Shoulder     Planes of Motion   Flexion: 5   Abduction: 5   External rotation at 0°: 5   Internal rotation at 0°: 5     Additional Strength Details  Right MMT not assessed due to surgical restrictions      Flowsheet Rows      Most Recent Value   PT/OT G-Codes   Current Score  32   Projected Score  63   FOTO information reviewed  Yes   Assessment Type  Evaluation   G code set  Carrying, Moving & Handling Objects   Carrying, Moving and Handling Objects Current Status ()  CL   Carrying, Moving and Handling Objects Goal Status ()  CJ          Precautions: - Hypertension , Stent in 2017, Diabetes , GERD , L-S discectomy 15 years ago      Specialty Daily Treatment Diary     Manual  9/6/18       STM Right UT, levator, rhomboids        PROM right shld as per protocol        ST joint mobs                            Exercise Diary  9/6       Gripping        Wrist flex/ext        Elbow flex/ext        Pendulums        Scap retraction                                                                                                                                    Modalities 9/6       MH - pre        CP - post        Visit # 1

## 2018-09-14 ENCOUNTER — OFFICE VISIT (OUTPATIENT)
Dept: PHYSICAL THERAPY | Facility: CLINIC | Age: 63
End: 2018-09-14
Payer: COMMERCIAL

## 2018-09-14 DIAGNOSIS — M75.121 COMPLETE ROTATOR CUFF TEAR OR RUPTURE OF RIGHT SHOULDER, NOT SPECIFIED AS TRAUMATIC: Primary | ICD-10-CM

## 2018-09-14 PROCEDURE — 97110 THERAPEUTIC EXERCISES: CPT | Performed by: PHYSICAL THERAPIST

## 2018-09-14 PROCEDURE — 97140 MANUAL THERAPY 1/> REGIONS: CPT | Performed by: PHYSICAL THERAPIST

## 2018-09-14 NOTE — PROGRESS NOTES
Daily Note     Today's date: 2018  Patient name: Romel Cortez  : 1955  MRN: [id number]  Referring provider: Edwige Everett MD  Dx:   Encounter Diagnosis     ICD-10-CM    1  Complete rotator cuff tear or rupture of right shoulder, not specified as traumatic M75 121                   Subjective: Pt reports 4/10 pain in right shoulder today  Objective: See treatment diary below    Precautions: - Hypertension , Stent in 2017, Diabetes , GERD , L-S discectomy 15 years ago        Specialty Daily Treatment Diary      Manual  18         STM Right UT, levator, rhomboids   12 min         PROM right shld as per protocol    13 min         ST joint mobs                                               Exercise Diary           Gripping    20x         Wrist flex/ext    20x         Elbow flex/ext    20x         Pendulums    20x         Scap retraction    20x                                                                                                                                                                                                                                 Modalities          MH - pre    10 min         CP - post    10 min         Visit # 1  2                 Assessment: Tolerated treatment well  Patient would benefit from continued PT      Plan: Continue per plan of care  Progress treament per protocol

## 2018-09-21 ENCOUNTER — OFFICE VISIT (OUTPATIENT)
Dept: PHYSICAL THERAPY | Facility: CLINIC | Age: 63
End: 2018-09-21
Payer: COMMERCIAL

## 2018-09-21 DIAGNOSIS — M75.121 COMPLETE ROTATOR CUFF TEAR OR RUPTURE OF RIGHT SHOULDER, NOT SPECIFIED AS TRAUMATIC: Primary | ICD-10-CM

## 2018-09-21 PROCEDURE — 97140 MANUAL THERAPY 1/> REGIONS: CPT

## 2018-09-21 PROCEDURE — 97110 THERAPEUTIC EXERCISES: CPT

## 2018-09-21 NOTE — PROGRESS NOTES
Daily Note     Today's date: 2018  Patient name: Joey Fernandes  : 1955  MRN: [id number]  Referring provider: Fitz Marcos MD  Dx:   Encounter Diagnosis     ICD-10-CM    1  Complete rotator cuff tear or rupture of right shoulder, not specified as traumatic M75 121                   Subjective: Pt reports 4/10 pain in right shoulder today  Objective: See treatment diary below    Precautions: - Hypertension , Stent in 2017, Diabetes , GERD , L-S discectomy 15 years ago        Specialty Daily Treatment Diary      Manual  18       STM Right UT, levator, rhomboids   12 min  10 min       PROM right shld as per protocol    13 min  10 min       ST joint mobs                                               Exercise Diary         Gripping    20x  50       Wrist flex/ext    20x  20       Elbow flex/ext    20x  20       Pendulums    20x  20       Scap retraction    20x  20        Cane flex      20        Cane ER      20        Isomets      10 X 3 sec 4 way                                                                                                                                                                                     Modalities        MH - pre    10 min  10 min       CP - post    10 min  10 min       Visit # 1  2 3               Assessment: Tolerated treatment well  Patient would benefit from continued PT  Displayed Passive flexion to Apprx 115 deg  No difficulty with new activites      Plan: Continue per plan of care  Progress treament per protocol

## 2018-09-25 ENCOUNTER — TELEPHONE (OUTPATIENT)
Dept: HEMATOLOGY ONCOLOGY | Facility: MEDICAL CENTER | Age: 63
End: 2018-09-25

## 2018-09-28 ENCOUNTER — OFFICE VISIT (OUTPATIENT)
Dept: PHYSICAL THERAPY | Facility: CLINIC | Age: 63
End: 2018-09-28
Payer: COMMERCIAL

## 2018-09-28 DIAGNOSIS — M75.121 COMPLETE ROTATOR CUFF TEAR OR RUPTURE OF RIGHT SHOULDER, NOT SPECIFIED AS TRAUMATIC: Primary | ICD-10-CM

## 2018-09-28 PROCEDURE — 97140 MANUAL THERAPY 1/> REGIONS: CPT | Performed by: PHYSICAL THERAPIST

## 2018-09-28 PROCEDURE — 97110 THERAPEUTIC EXERCISES: CPT | Performed by: PHYSICAL THERAPIST

## 2018-09-28 NOTE — PROGRESS NOTES
Daily Note     Today's date: 2018  Patient name: Fuad Villasenor  : 1955  MRN: [id number]  Referring provider: Juan Paredes MD  Dx:   Encounter Diagnosis     ICD-10-CM    1  Complete rotator cuff tear or rupture of right shoulder, not specified as traumatic M75 121                   Subjective: Pt reports 2/10 pain in right shoulder today  " It is a little sore from doing the stretches last night"        Objective: See treatment diary below    Precautions: - Hypertension , Stent in 2017, Diabetes , GERD , L-S discectomy 15 years ago,   Surgery on 18        Specialty Daily Treatment Diary      Manual  18     STM Right UT, levator, rhomboids   12 min  10 min  7 min     PROM right shld as per protocol    13 min  10 min  7 min     ST joint mobs                                               Exercise Diary       Gripping    20x  50  -----     Wrist flex/ext    20x  20       Elbow flex/ext    20x  20  50x     Pendulums    20x  20  2 x 3min     Scap retraction    20x  20  30x      Cane flex      20  20x      Cane ER      20  20x      Isomets      10 X 3 sec 4 way  15x  5" hold      Bent row        20x      Pulleys        15x                                                                                                                                                       Modalities      MH - pre    10 min  10 min  10 min     CP - post    10 min  10 min  5 min     Visit # 1  2 3  4 - FOTO             Assessment: He agreed to purchase pulleys and perform at a mild intensity at home  He agreed to use a guide of 4/10 to 5/10 pain level as his max intensity during stretching activities  Plan: Continue per plan of care  Progress treament per protocol  Begin 6 week protocol activities next session

## 2018-10-04 ENCOUNTER — OFFICE VISIT (OUTPATIENT)
Dept: PHYSICAL THERAPY | Facility: CLINIC | Age: 63
End: 2018-10-04
Payer: COMMERCIAL

## 2018-10-04 DIAGNOSIS — M75.121 COMPLETE ROTATOR CUFF TEAR OR RUPTURE OF RIGHT SHOULDER, NOT SPECIFIED AS TRAUMATIC: Primary | ICD-10-CM

## 2018-10-04 PROCEDURE — 97110 THERAPEUTIC EXERCISES: CPT

## 2018-10-04 PROCEDURE — 97140 MANUAL THERAPY 1/> REGIONS: CPT

## 2018-10-04 NOTE — PROGRESS NOTES
Daily Note     Today's date: 10/4/2018  Patient name: Rachel Godinez  : 1955  MRN: [id number]  Referring provider: Bry Gong MD  Dx:   Encounter Diagnosis     ICD-10-CM    1  Complete rotator cuff tear or rupture of right shoulder, not specified as traumatic M75 121                   Subjective: Pt reports 1/10 pain in right shoulder today  Objective: See treatment diary below    Precautions: - Hypertension , Stent in 2017, Diabetes , GERD , L-S discectomy 15 years ago,   Surgery on 18        Specialty Daily Treatment Diary      Manual  9/6/18  9/14/18  9/21/18  9/28/18  10/4/18   STM Right UT, levator, rhomboids   12 min  10 min  7 min  8 min   PROM right shld as per protocol    13 min  10 min  7 min 7 min   ST joint mobs                                               Exercise Diary  9/6  9/14  9/21  9/18  10/4   Gripping    20x  50  -----     Wrist flex/ext    20x  20       Elbow flex/ext    20x  20  50x 50   Pendulums    20x  20  2 x 3min  2 x 3 min   Scap retraction    20x  20  30x  30    Cane flex      20  20x  20    Cane ER      20  20x  20    Isomets      10 X 3 sec 4 way  15x  5" hold  15 x 5 sec    Bent row        20x  20    Pulleys        15x  20                                                                                                                                                     Modalities 9/6  9/14  9/21  9/28  10/4   MH - pre    10 min  10 min  10 min  10 min   CP - post    10 min  10 min  5 min  10 min   Visit # 1  2 3  4 - FOTO  5           Assessment: He agreed to purchase pulleys and perform at a mild intensity at home  He agreed to use a guide of 4/10 to 5/10 pain level as his max intensity during stretching activities  ROM appears to be normalizing well 130 deg passive flexion with soft end feel 39 deg ER passive      Plan: Continue per plan of care  Progress treament per protocol  Begin 6 week protocol activities next session

## 2018-10-08 ENCOUNTER — OFFICE VISIT (OUTPATIENT)
Dept: OBGYN CLINIC | Facility: CLINIC | Age: 63
End: 2018-10-08

## 2018-10-08 VITALS
HEIGHT: 69 IN | DIASTOLIC BLOOD PRESSURE: 77 MMHG | BODY MASS INDEX: 27.4 KG/M2 | WEIGHT: 185 LBS | SYSTOLIC BLOOD PRESSURE: 154 MMHG | HEART RATE: 97 BPM

## 2018-10-08 DIAGNOSIS — Z98.890 STATUS POST RIGHT ROTATOR CUFF REPAIR: ICD-10-CM

## 2018-10-08 DIAGNOSIS — M75.121 COMPLETE TEAR OF RIGHT ROTATOR CUFF: Primary | ICD-10-CM

## 2018-10-08 PROCEDURE — 99024 POSTOP FOLLOW-UP VISIT: CPT | Performed by: ORTHOPAEDIC SURGERY

## 2018-10-08 RX ORDER — BIMATOPROST 0.01 %
1 DROPS OPHTHALMIC (EYE)
COMMUNITY
Start: 2018-09-12
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
## 2018-10-11 ENCOUNTER — OFFICE VISIT (OUTPATIENT)
Dept: PHYSICAL THERAPY | Facility: CLINIC | Age: 63
End: 2018-10-11
Payer: COMMERCIAL

## 2018-10-11 DIAGNOSIS — M75.121 COMPLETE ROTATOR CUFF TEAR OR RUPTURE OF RIGHT SHOULDER, NOT SPECIFIED AS TRAUMATIC: Primary | ICD-10-CM

## 2018-10-11 PROCEDURE — 97110 THERAPEUTIC EXERCISES: CPT | Performed by: PHYSICAL THERAPIST

## 2018-10-11 PROCEDURE — 97140 MANUAL THERAPY 1/> REGIONS: CPT | Performed by: PHYSICAL THERAPIST

## 2018-10-11 NOTE — PROGRESS NOTES
Daily Note     Today's date: 10/11/2018  Patient name: Rian Tyson  : 1955  MRN: [id number]  Referring provider: Sondra Rachel MD  Dx:   Encounter Diagnosis     ICD-10-CM    1  Complete rotator cuff tear or rupture of right shoulder, not specified as traumatic M75 121                   Subjective: Pt reports 1/10 pain         Objective: See treatment diary below    Precautions: - Hypertension , Stent in 2017, Diabetes , GERD , L-S discectomy 15 years ago,   Surgery on 18        Specialty Daily Treatment Diary      Manual  10/11/18       STM Right UT, levator, rhomboids  6 min       PROM right shld as per protocol  6 min - began PROM ABD       ST joint mobs                                               Exercise Diary  10/11       Pulleys  20x       Bent row  20x       Elbow flex/ext  30x       Pendulums  2 min x 2       Scap retraction  30x        Cane flex  20x        Cane ER  20x        Isomets  20x                                                                                                                                                                               Modalities 10/11       MH - pre  10 min       CP - post  5 min       Visit # 6               Assessment: He agreed to perform abduction PROM with a cane for HEP  Plan: Continue per plan of care  Progress treament per protocol

## 2018-10-16 ENCOUNTER — APPOINTMENT (OUTPATIENT)
Dept: PHYSICAL THERAPY | Facility: CLINIC | Age: 63
End: 2018-10-16
Payer: COMMERCIAL

## 2018-10-18 ENCOUNTER — OFFICE VISIT (OUTPATIENT)
Dept: PHYSICAL THERAPY | Facility: CLINIC | Age: 63
End: 2018-10-18
Payer: COMMERCIAL

## 2018-10-18 DIAGNOSIS — M75.121 COMPLETE ROTATOR CUFF TEAR OR RUPTURE OF RIGHT SHOULDER, NOT SPECIFIED AS TRAUMATIC: Primary | ICD-10-CM

## 2018-10-18 PROCEDURE — 97110 THERAPEUTIC EXERCISES: CPT

## 2018-10-18 PROCEDURE — 97140 MANUAL THERAPY 1/> REGIONS: CPT

## 2018-10-25 ENCOUNTER — OFFICE VISIT (OUTPATIENT)
Dept: PHYSICAL THERAPY | Facility: CLINIC | Age: 63
End: 2018-10-25
Payer: COMMERCIAL

## 2018-10-25 ENCOUNTER — APPOINTMENT (OUTPATIENT)
Dept: PHYSICAL THERAPY | Facility: CLINIC | Age: 63
End: 2018-10-25
Payer: COMMERCIAL

## 2018-10-25 DIAGNOSIS — M75.121 COMPLETE ROTATOR CUFF TEAR OR RUPTURE OF RIGHT SHOULDER, NOT SPECIFIED AS TRAUMATIC: Primary | ICD-10-CM

## 2018-10-25 PROCEDURE — 97140 MANUAL THERAPY 1/> REGIONS: CPT | Performed by: PHYSICAL THERAPIST

## 2018-10-25 PROCEDURE — 97110 THERAPEUTIC EXERCISES: CPT | Performed by: PHYSICAL THERAPIST

## 2018-10-25 NOTE — PROGRESS NOTES
Daily Note     Today's date: 10/25/2018  Patient name: Marcelle Anna  : 1955  MRN: [id number]  Referring provider: Quintin Berger MD  Dx:   Encounter Diagnosis     ICD-10-CM    1  Complete rotator cuff tear or rupture of right shoulder, not specified as traumatic M75 121                   Subjective: Pt reports 1/10 pain         Objective: See treatment diary below    Precautions: - Hypertension , Stent in 2017, Diabetes , GERD , L-S discectomy 15 years ago,   Surgery on 18        Specialty Daily Treatment Diary      Manual  10/11/18 10/18/18 10/25/18     STM Right UT, levator, rhomboids  6 min 6 min 5 min     PROM right shld as per protocol  6 min - began PROM ABD 6 min  5 min     ST joint mobs      2 min                                         Exercise Diary  10/11 10/18 10/25     Pulleys  20x 30 30x     Bent row  20x 30 x 1# 30x  2#     Elbow flex/ext  30x 30 x 1# 30x  2#     Pendulums  2 min x 2 2 min x 1# 2 min  2#     Scap retraction  30x 30 TBand row  RED 20x      Cane flex  20x 20 20x      Cane ER  20x 20 20x      Isomets  20x 20 ---------     NuStep     5 min     AROM flex/ abd to 90      10x      TBand IR/ER      10x  yellow                                                                                                                                           Modalities 10/11 10/18 10/25     MH - pre  10 min 5 min -------     CP - post  5 min 10 min 10 min     Visit # 6 7 8             Assessment:  He had some superior lateral Right shld pain today during pendulums and IE/ ER  Plan: Continue per plan of care  Progress treament per protocol

## 2018-11-01 ENCOUNTER — OFFICE VISIT (OUTPATIENT)
Dept: PHYSICAL THERAPY | Facility: CLINIC | Age: 63
End: 2018-11-01
Payer: COMMERCIAL

## 2018-11-01 DIAGNOSIS — M75.121 COMPLETE ROTATOR CUFF TEAR OR RUPTURE OF RIGHT SHOULDER, NOT SPECIFIED AS TRAUMATIC: Primary | ICD-10-CM

## 2018-11-01 PROCEDURE — 97110 THERAPEUTIC EXERCISES: CPT

## 2018-11-01 PROCEDURE — 97140 MANUAL THERAPY 1/> REGIONS: CPT

## 2018-11-01 NOTE — PROGRESS NOTES
Daily Note     Today's date: 2018  Patient name: Genia Zepeda  : 1955  MRN: [id number]  Referring provider: Kurt Carmichael MD  Dx:   Encounter Diagnosis     ICD-10-CM    1  Complete rotator cuff tear or rupture of right shoulder, not specified as traumatic M75 121                   Subjective: Pt reports 1/10 pain         Objective: See treatment diary below    Precautions: - Hypertension , Stent in 2017, Diabetes , GERD , L-S discectomy 15 years ago,   Surgery on 18        Specialty Daily Treatment Diary      Manual  10/11/18 10/18/18 10/25/18 11/1/18    STM Right UT, levator, rhomboids  6 min 6 min 5 min 5 min    PROM right shld as per protocol  6 min - began PROM ABD 6 min  5 min 5 min    ST joint mobs      2 min  2 min                                       Exercise Diary  10/11 10/18 10/25 11/1    Pulleys  20x 30 30x 30    Bent row  20x 30 x 1# 30x  2# 30 x 2#    Elbow flex/ext  30x 30 x 1# 30x  2# 30 x 2#    Pendulums  2 min x 2 2 min x 1# 2 min  2#     Scap retraction  30x 30 TBand row  RED 20x 20 x red     Cane flex  20x 20 20x 20     Cane ER  20x 20 20x 20     Isomets  20x 20 --------- 20    NuStep     5 min 10 min    AROM flex/ abd to 90      10x 10     TBand IR/ER      10x  yellow  10 yellow                                                                                                                                         Modalities 10/11 10/18 10/25 11/1    MH - pre  10 min 5 min -------     CP - post  5 min 10 min 10 min 10 mi    Visit # 6 7 8 9            Assessment:  Showed limited ER and much tightness in R pec    Plan: Continue per plan of care  Progress treament per protocol

## 2018-11-08 ENCOUNTER — OFFICE VISIT (OUTPATIENT)
Dept: PHYSICAL THERAPY | Facility: CLINIC | Age: 63
End: 2018-11-08
Payer: COMMERCIAL

## 2018-11-08 DIAGNOSIS — M75.121 COMPLETE ROTATOR CUFF TEAR OR RUPTURE OF RIGHT SHOULDER, NOT SPECIFIED AS TRAUMATIC: Primary | ICD-10-CM

## 2018-11-08 PROCEDURE — 97140 MANUAL THERAPY 1/> REGIONS: CPT | Performed by: PHYSICAL THERAPIST

## 2018-11-08 PROCEDURE — 97110 THERAPEUTIC EXERCISES: CPT | Performed by: PHYSICAL THERAPIST

## 2018-11-08 NOTE — PROGRESS NOTES
Daily Note     Today's date: 2018  Patient name: Oma Young  : 1955  MRN: [id number]  Referring provider: Sulma Haq MD  Dx:   Encounter Diagnosis     ICD-10-CM    1  Complete rotator cuff tear or rupture of right shoulder, not specified as traumatic M75 121                   Subjective: Pt reports 1/10 pain         Objective: See treatment diary below    Precautions: - Hypertension , Stent in 2017, Diabetes , GERD , L-S discectomy 15 years ago,   Surgery on 18        Specialty Daily Treatment Diary      Manual  10/11/18 10/18/18 10/25/18 11/1/18 11/8/18   STM Right UT, levator, rhomboids  6 min 6 min 5 min 5 min 5 min   PROM right shld as per protocol  6 min - began PROM ABD 6 min  5 min 5 min 5 min   ST joint mobs      2 min  2 min  2 min                                     Exercise Diary  10/11 10/18 10/25 11/1 11/8   Pulleys  20x 30 30x 30 30x   Bent row  20x 30 x 1# 30x  2# 30 x 2# 30x  4#   Elbow flex/ext  30x 30 x 1# 30x  2# 30 x 2# 30x  4#   Pendulums  2 min x 2 2 min x 1# 2 min  2#  -----   Scap retraction  30x 30 TBand row  RED 20x 20 x red TBand row red  30x    Cane flex  20x 20 20x 20 20x    Cane ER  20x 20 20x 20 20x    Isomets  20x 20 --------- 20 10x   NuStep     5 min 10 min 10 min   AROM flex/ abd to 90      10x 10 20x    TBand IR/ER      10x  yellow  10 yellow  20x  red    Supine flex AROM          20x  1#                                                                                                                         Modalities 10/11 10/18 10/25 11/1 11/8   MH - pre  10 min 5 min -------  -----   CP - post  5 min 10 min 10 min 10 mi -----   Visit # 6 7 8 9 10            Assessment:   Needs cues at times to prevent scapular elevation in early flexion    Plan: Continue per plan of care  Progress treament per protocol

## 2018-11-19 ENCOUNTER — OFFICE VISIT (OUTPATIENT)
Dept: OBGYN CLINIC | Facility: CLINIC | Age: 63
End: 2018-11-19

## 2018-11-19 VITALS
SYSTOLIC BLOOD PRESSURE: 137 MMHG | BODY MASS INDEX: 27.46 KG/M2 | DIASTOLIC BLOOD PRESSURE: 79 MMHG | WEIGHT: 185.4 LBS | HEIGHT: 69 IN | HEART RATE: 75 BPM

## 2018-11-19 DIAGNOSIS — M75.121 COMPLETE TEAR OF RIGHT ROTATOR CUFF: Primary | ICD-10-CM

## 2018-11-19 PROCEDURE — 99024 POSTOP FOLLOW-UP VISIT: CPT | Performed by: ORTHOPAEDIC SURGERY

## 2018-11-19 RX ORDER — METFORMIN HYDROCHLORIDE 500 MG/1
TABLET, EXTENDED RELEASE ORAL
COMMUNITY
Start: 2018-11-04 | End: 2018-11-19 | Stop reason: SDUPTHER
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
## 2018-11-29 ENCOUNTER — OFFICE VISIT (OUTPATIENT)
Dept: PHYSICAL THERAPY | Facility: CLINIC | Age: 63
End: 2018-11-29
Payer: COMMERCIAL

## 2018-11-29 DIAGNOSIS — M75.121 COMPLETE ROTATOR CUFF TEAR OR RUPTURE OF RIGHT SHOULDER, NOT SPECIFIED AS TRAUMATIC: Primary | ICD-10-CM

## 2018-11-29 PROCEDURE — 97110 THERAPEUTIC EXERCISES: CPT | Performed by: PHYSICAL THERAPIST

## 2018-11-29 PROCEDURE — 97140 MANUAL THERAPY 1/> REGIONS: CPT | Performed by: PHYSICAL THERAPIST

## 2018-11-29 NOTE — PROGRESS NOTES
Daily Note     Today's date: 2018  Patient name: Les Marshall  : 1955  MRN: [id number]  Referring provider: Naresh James MD  Dx:   Encounter Diagnosis     ICD-10-CM    1  Complete rotator cuff tear or rupture of right shoulder, not specified as traumatic M75 121                   Subjective: Pt reports no pain and the clicking he has had lately has stopped  Objective: See treatment diary below    Precautions: - Hypertension , Stent in 2017, Diabetes , GERD , L-S discectomy 15 years ago,   Surgery on 18        Specialty Daily Treatment Diary      Manual  18       STM Right UT, levator, rhomboids  2 min       PROM right shld as per protocol  6 min        ST joint mobs  2 min                                         Exercise Diary  18       NuStep 10 min       Bent row  20x   6#       Elbow flex/ext  30x   6#       Pulleys  30x       Machine row  20#   30x        Cane flex  20x        Cane ER  20x       AROM flex/ abd to 90  20x        TBand IR/ER  blue  20x        Supine flex AROM  20x        Lat pull down  10x   20#            Ball overhead                                                                                                       Modalities        CP                Visit # 11               Assessment:    He has weakness with overhead rech    Plan:   Continue PT  Add ball overhead reach

## 2018-12-06 ENCOUNTER — OFFICE VISIT (OUTPATIENT)
Dept: PHYSICAL THERAPY | Facility: CLINIC | Age: 63
End: 2018-12-06
Payer: COMMERCIAL

## 2018-12-06 DIAGNOSIS — M75.121 COMPLETE ROTATOR CUFF TEAR OR RUPTURE OF RIGHT SHOULDER, NOT SPECIFIED AS TRAUMATIC: Primary | ICD-10-CM

## 2018-12-06 PROCEDURE — 97140 MANUAL THERAPY 1/> REGIONS: CPT

## 2018-12-06 PROCEDURE — 97110 THERAPEUTIC EXERCISES: CPT

## 2018-12-06 NOTE — PROGRESS NOTES
Daily Note     Today's date: 2018  Patient name: Marcelle Anna  : 1955  MRN: [id number]  Referring provider: Quintin Berger MD  Dx:   Encounter Diagnosis     ICD-10-CM    1  Complete rotator cuff tear or rupture of right shoulder, not specified as traumatic M75 121                   Subjective: Pt reports no pain in R shld      Objective: See treatment diary below    Precautions: - Hypertension , Stent in 2017, Diabetes , GERD , L-S discectomy 15 years ago,   Surgery on 18        Specialty Daily Treatment Diary      Manual  18      STM Right UT, levator, rhomboids  2 min 7 min      PROM right shld as per protocol  6 min  5 min      ST joint mobs  2 min 3 min                                        Exercise Diary  18      NuStep 10 min 10 min       Bent row  20x   6# 20 x 6#      Elbow flex/ext  30x   6# 30 x 6#      Pulleys  30x 30      Machine row  20#   30x 30 x 20#       Cane flex  20x 20       Cane ER  20x 20      AROM flex/ abd to 90  20x 20 X 1#       TBand IR/ER  blue  20x 20 X blue       Supine flex AROM  20x 20       Lat pull down  10x   20#  20 x 20#          Ball overhead    20 x 2#                                                                                                   Modalities       CP                Visit # 11 12              Assessment:    He has weakness with overhead reach  Otherwise ROM normalizing well  Minimal hiking persist    Plan:   Continue PT  Add ball overhead reach

## 2018-12-13 ENCOUNTER — APPOINTMENT (OUTPATIENT)
Dept: PHYSICAL THERAPY | Facility: CLINIC | Age: 63
End: 2018-12-13
Payer: COMMERCIAL

## 2018-12-20 ENCOUNTER — OFFICE VISIT (OUTPATIENT)
Dept: PHYSICAL THERAPY | Facility: CLINIC | Age: 63
End: 2018-12-20
Payer: COMMERCIAL

## 2018-12-20 DIAGNOSIS — M75.121 COMPLETE ROTATOR CUFF TEAR OR RUPTURE OF RIGHT SHOULDER, NOT SPECIFIED AS TRAUMATIC: Primary | ICD-10-CM

## 2018-12-20 PROCEDURE — 97140 MANUAL THERAPY 1/> REGIONS: CPT

## 2018-12-20 PROCEDURE — 97110 THERAPEUTIC EXERCISES: CPT

## 2018-12-20 NOTE — PROGRESS NOTES
Daily Note     Today's date: 2018  Patient name: Pankaj Denney  : 1955  MRN: [id number]  Referring provider: Chrissy Diamond MD  Dx:   Encounter Diagnosis     ICD-10-CM    1  Complete rotator cuff tear or rupture of right shoulder, not specified as traumatic M75 121                   Subjective: Pt reports no pain in R shld but stated that he had pain after last session      Objective: See treatment diary below    Precautions: - Hypertension , Stent in 2017, Diabetes , GERD , L-S discectomy 15 years ago,   Surgery on 18        Specialty Daily Treatment Diary      Manual  18     STM Right UT, levator, rhomboids  2 min 7 min 7 min     PROM right shld as per protocol  6 min  5 min 5 min     ST joint mobs  2 min 3 min 3 min                                       Exercise Diary  18     NuStep 10 min 10 min  10 min     Bent row  20x   6# 20 x 6# 20 x 6#     Elbow flex/ext  30x   6# 30 x 6# 30 X 6#     Pulleys  30x 30 30     Machine row  20#   30x 30 x 20# 30 X 20#      Cane flex  20x 20 20      Cane ER  20x 20 20     AROM flex/ abd to 90  20x 20 X 1# 20 X 2#      TBand IR/ER  blue  20x 20 X blue 20 x Blue      Supine flex AROM  20x 20       Lat pull down  10x   20#  20 x 20#  20 X 20#        Ball overhead    20 x 2#  20 x 2#                                                                                                 Modalities      CP                Visit # 11 12 13              Assessment:    He has weakness with overhead reach  Otherwise ROM normalizing well    Minimal hiking persist    Plan:   Continue PT

## 2018-12-31 ENCOUNTER — OFFICE VISIT (OUTPATIENT)
Dept: OBGYN CLINIC | Facility: CLINIC | Age: 63
End: 2018-12-31
Payer: COMMERCIAL

## 2018-12-31 VITALS
BODY MASS INDEX: 26.66 KG/M2 | WEIGHT: 180 LBS | HEIGHT: 69 IN | HEART RATE: 71 BPM | DIASTOLIC BLOOD PRESSURE: 83 MMHG | SYSTOLIC BLOOD PRESSURE: 162 MMHG

## 2018-12-31 DIAGNOSIS — G56.03 CARPAL TUNNEL SYNDROME, BILATERAL: ICD-10-CM

## 2018-12-31 DIAGNOSIS — M75.121 COMPLETE TEAR OF RIGHT ROTATOR CUFF: Primary | ICD-10-CM

## 2018-12-31 PROCEDURE — 99214 OFFICE O/P EST MOD 30 MIN: CPT | Performed by: ORTHOPAEDIC SURGERY
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
## 2019-03-01 ENCOUNTER — HOSPITAL ENCOUNTER (OUTPATIENT)
Dept: NEUROLOGY | Facility: HOSPITAL | Age: 64
Discharge: HOME | End: 2019-03-01
Attending: ORTHOPAEDIC SURGERY
Payer: COMMERCIAL

## 2019-03-01 DIAGNOSIS — G56.03 CARPAL TUNNEL SYNDROME, BILATERAL: ICD-10-CM

## 2019-03-01 PROCEDURE — 95885 MUSC TST DONE W/NERV TST LIM: CPT | Performed by: PSYCHIATRY & NEUROLOGY

## 2019-03-01 PROCEDURE — 95911 NRV CNDJ TEST 9-10 STUDIES: CPT | Performed by: PSYCHIATRY & NEUROLOGY

## 2019-03-15 ENCOUNTER — OFFICE VISIT (OUTPATIENT)
Dept: OBGYN CLINIC | Facility: CLINIC | Age: 64
End: 2019-03-15
Payer: COMMERCIAL

## 2019-03-15 ENCOUNTER — TELEPHONE (OUTPATIENT)
Dept: OBGYN CLINIC | Facility: CLINIC | Age: 64
End: 2019-03-15

## 2019-03-15 VITALS
DIASTOLIC BLOOD PRESSURE: 82 MMHG | WEIGHT: 185.2 LBS | BODY MASS INDEX: 27.43 KG/M2 | HEART RATE: 71 BPM | SYSTOLIC BLOOD PRESSURE: 153 MMHG | HEIGHT: 69 IN

## 2019-03-15 DIAGNOSIS — Z98.890 STATUS POST RIGHT ROTATOR CUFF REPAIR: ICD-10-CM

## 2019-03-15 DIAGNOSIS — G56.03 CARPAL TUNNEL SYNDROME, BILATERAL: Primary | ICD-10-CM

## 2019-03-15 DIAGNOSIS — E11.8 TYPE 2 DIABETES MELLITUS WITH COMPLICATION, UNSPECIFIED WHETHER LONG TERM INSULIN USE: ICD-10-CM

## 2019-03-15 PROBLEM — E11.9 DIABETES MELLITUS (HCC): Status: ACTIVE | Noted: 2019-03-15

## 2019-03-15 PROCEDURE — 99214 OFFICE O/P EST MOD 30 MIN: CPT | Performed by: ORTHOPAEDIC SURGERY

## 2019-03-15 RX ORDER — LOSARTAN POTASSIUM 50 MG/1
50 TABLET ORAL
Status: ON HOLD | COMMUNITY
End: 2019-05-30 | Stop reason: SDUPTHER

## 2019-03-15 RX ORDER — METOPROLOL SUCCINATE 25 MG/1
25 TABLET, EXTENDED RELEASE ORAL
Status: ON HOLD | COMMUNITY
Start: 2017-06-27 | End: 2019-05-30 | Stop reason: SDUPTHER

## 2019-03-15 RX ORDER — LANSOPRAZOLE 30 MG/1
30 CAPSULE, DELAYED RELEASE ORAL
Status: ON HOLD | COMMUNITY
End: 2019-05-30 | Stop reason: SDUPTHER
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
## 2019-03-15 NOTE — TELEPHONE ENCOUNTER
Rina Valladarescorinne was brought to my office to schedule B/L CTR  While discussing dates, he has decided to wait on the surgeries  He took my card and he will reach out to me once he is ready to schedule  He is very busy and has decided to not worry about the new deductible with his insurance and wishes to wait until late summer to have surgery

## 2019-03-31 NOTE — PROGRESS NOTES
PT Discharge    Patient has not attended his last scheduled PT session  Therefore, there is no formal objective data available  D/C at this time

## 2019-05-07 ENCOUNTER — TELEPHONE (OUTPATIENT)
Dept: OBGYN CLINIC | Facility: CLINIC | Age: 64
End: 2019-05-07

## 2019-05-08 ENCOUNTER — OFFICE VISIT (OUTPATIENT)
Dept: LAB | Facility: HOSPITAL | Age: 64
End: 2019-05-08
Attending: ORTHOPAEDIC SURGERY
Payer: COMMERCIAL

## 2019-05-08 ENCOUNTER — TRANSCRIBE ORDERS (OUTPATIENT)
Dept: ADMINISTRATIVE | Facility: HOSPITAL | Age: 64
End: 2019-05-08

## 2019-05-08 DIAGNOSIS — M75.121 COMPLETE TEAR OF RIGHT ROTATOR CUFF: ICD-10-CM

## 2019-05-08 DIAGNOSIS — Z01.818 PREOPERATIVE TESTING: ICD-10-CM

## 2019-05-08 LAB
ANION GAP SERPL CALCULATED.3IONS-SCNC: 8 MMOL/L (ref 4–13)
APTT PPP: 27 SECONDS (ref 26–38)
ATRIAL RATE: 65 BPM
BASOPHILS # BLD AUTO: 0.06 THOUSANDS/ΜL (ref 0–0.1)
BASOPHILS NFR BLD AUTO: 1 % (ref 0–1)
BUN SERPL-MCNC: 18 MG/DL (ref 5–25)
CALCIUM SERPL-MCNC: 9.7 MG/DL (ref 8.3–10.1)
CHLORIDE SERPL-SCNC: 104 MMOL/L (ref 100–108)
CO2 SERPL-SCNC: 29 MMOL/L (ref 21–32)
CREAT SERPL-MCNC: 1.24 MG/DL (ref 0.6–1.3)
EOSINOPHIL # BLD AUTO: 0.22 THOUSAND/ΜL (ref 0–0.61)
EOSINOPHIL NFR BLD AUTO: 3 % (ref 0–6)
ERYTHROCYTE [DISTWIDTH] IN BLOOD BY AUTOMATED COUNT: 12.5 % (ref 11.6–15.1)
GFR SERPL CREATININE-BSD FRML MDRD: 61 ML/MIN/1.73SQ M
GLUCOSE P FAST SERPL-MCNC: 250 MG/DL (ref 65–99)
HCT VFR BLD AUTO: 45.6 % (ref 36.5–49.3)
HGB BLD-MCNC: 14.4 G/DL (ref 12–17)
IMM GRANULOCYTES # BLD AUTO: 0.01 THOUSAND/UL (ref 0–0.2)
IMM GRANULOCYTES NFR BLD AUTO: 0 % (ref 0–2)
INR PPP: 1 (ref 0.86–1.16)
LYMPHOCYTES # BLD AUTO: 2.18 THOUSANDS/ΜL (ref 0.6–4.47)
LYMPHOCYTES NFR BLD AUTO: 32 % (ref 14–44)
MCH RBC QN AUTO: 29.3 PG (ref 26.8–34.3)
MCHC RBC AUTO-ENTMCNC: 31.6 G/DL (ref 31.4–37.4)
MCV RBC AUTO: 93 FL (ref 82–98)
MONOCYTES # BLD AUTO: 0.63 THOUSAND/ΜL (ref 0.17–1.22)
MONOCYTES NFR BLD AUTO: 9 % (ref 4–12)
NEUTROPHILS # BLD AUTO: 3.63 THOUSANDS/ΜL (ref 1.85–7.62)
NEUTS SEG NFR BLD AUTO: 55 % (ref 43–75)
NRBC BLD AUTO-RTO: 0 /100 WBCS
P AXIS: 66 DEGREES
PLATELET # BLD AUTO: 195 THOUSANDS/UL (ref 149–390)
PMV BLD AUTO: 12.3 FL (ref 8.9–12.7)
POTASSIUM SERPL-SCNC: 4.3 MMOL/L (ref 3.5–5.3)
PR INTERVAL: 166 MS
PROTHROMBIN TIME: 10.5 SECONDS (ref 9.4–11.7)
QRS AXIS: 26 DEGREES
QRSD INTERVAL: 108 MS
QT INTERVAL: 420 MS
QTC INTERVAL: 436 MS
RBC # BLD AUTO: 4.91 MILLION/UL (ref 3.88–5.62)
SODIUM SERPL-SCNC: 141 MMOL/L (ref 136–145)
T WAVE AXIS: 28 DEGREES
VENTRICULAR RATE: 65 BPM
WBC # BLD AUTO: 6.73 THOUSAND/UL (ref 4.31–10.16)

## 2019-05-08 PROCEDURE — 85025 COMPLETE CBC W/AUTO DIFF WBC: CPT

## 2019-05-08 PROCEDURE — 93005 ELECTROCARDIOGRAM TRACING: CPT

## 2019-05-08 PROCEDURE — 80048 BASIC METABOLIC PNL TOTAL CA: CPT

## 2019-05-08 PROCEDURE — 85610 PROTHROMBIN TIME: CPT

## 2019-05-08 PROCEDURE — 36415 COLL VENOUS BLD VENIPUNCTURE: CPT

## 2019-05-08 PROCEDURE — 93010 ELECTROCARDIOGRAM REPORT: CPT | Performed by: INTERNAL MEDICINE

## 2019-05-08 PROCEDURE — 85730 THROMBOPLASTIN TIME PARTIAL: CPT

## 2019-05-11 ENCOUNTER — ANESTHESIA EVENT (OUTPATIENT)
Dept: PERIOP | Facility: HOSPITAL | Age: 64
End: 2019-05-11
Payer: COMMERCIAL

## 2019-05-13 ENCOUNTER — HOSPITAL ENCOUNTER (OUTPATIENT)
Facility: HOSPITAL | Age: 64
Setting detail: OUTPATIENT SURGERY
Discharge: HOME | End: 2019-05-13
Attending: ORTHOPAEDIC SURGERY | Admitting: ORTHOPAEDIC SURGERY
Payer: COMMERCIAL

## 2019-05-13 ENCOUNTER — ANESTHESIA (OUTPATIENT)
Dept: PERIOP | Facility: HOSPITAL | Age: 64
End: 2019-05-13
Payer: COMMERCIAL

## 2019-05-13 VITALS
RESPIRATION RATE: 18 BRPM | WEIGHT: 181.13 LBS | SYSTOLIC BLOOD PRESSURE: 157 MMHG | DIASTOLIC BLOOD PRESSURE: 84 MMHG | HEIGHT: 69 IN | OXYGEN SATURATION: 96 % | TEMPERATURE: 97 F | HEART RATE: 61 BPM | BODY MASS INDEX: 26.83 KG/M2

## 2019-05-13 LAB — GLUCOSE SERPL-MCNC: 170 MG/DL (ref 65–140)

## 2019-05-13 PROCEDURE — 64721 CARPAL TUNNEL SURGERY: CPT | Performed by: ORTHOPAEDIC SURGERY

## 2019-05-13 PROCEDURE — 64721 CARPAL TUNNEL SURGERY: CPT | Performed by: PHYSICIAN ASSISTANT

## 2019-05-13 PROCEDURE — 82948 REAGENT STRIP/BLOOD GLUCOSE: CPT

## 2019-05-13 PROCEDURE — NC001 PR NO CHARGE: Performed by: PHYSICIAN ASSISTANT

## 2019-05-13 RX ORDER — FENTANYL CITRATE/PF 50 MCG/ML
25 SYRINGE (ML) INJECTION
Status: DISCONTINUED | OUTPATIENT
Start: 2019-05-13 | End: 2019-05-13 | Stop reason: HOSPADM

## 2019-05-13 RX ORDER — DIPHENHYDRAMINE HYDROCHLORIDE 50 MG/ML
12.5 INJECTION INTRAMUSCULAR; INTRAVENOUS ONCE AS NEEDED
Status: DISCONTINUED | OUTPATIENT
Start: 2019-05-13 | End: 2019-05-13 | Stop reason: HOSPADM

## 2019-05-13 RX ORDER — PROPOFOL 10 MG/ML
INJECTION, EMULSION INTRAVENOUS CONTINUOUS PRN
Status: DISCONTINUED | OUTPATIENT
Start: 2019-05-13 | End: 2019-05-13 | Stop reason: SURG

## 2019-05-13 RX ORDER — SODIUM CHLORIDE, SODIUM LACTATE, POTASSIUM CHLORIDE, CALCIUM CHLORIDE 600; 310; 30; 20 MG/100ML; MG/100ML; MG/100ML; MG/100ML
100 INJECTION, SOLUTION INTRAVENOUS CONTINUOUS
Status: DISCONTINUED | OUTPATIENT
Start: 2019-05-13 | End: 2019-05-13 | Stop reason: HOSPADM

## 2019-05-13 RX ORDER — ONDANSETRON 2 MG/ML
4 INJECTION INTRAMUSCULAR; INTRAVENOUS ONCE AS NEEDED
Status: DISCONTINUED | OUTPATIENT
Start: 2019-05-13 | End: 2019-05-13 | Stop reason: HOSPADM

## 2019-05-13 RX ORDER — SODIUM CHLORIDE, SODIUM LACTATE, POTASSIUM CHLORIDE, CALCIUM CHLORIDE 600; 310; 30; 20 MG/100ML; MG/100ML; MG/100ML; MG/100ML
100 INJECTION, SOLUTION INTRAVENOUS CONTINUOUS
Status: DISCONTINUED | OUTPATIENT
Start: 2019-05-13 | End: 2019-05-13 | Stop reason: SDUPTHER

## 2019-05-13 RX ORDER — ONDANSETRON 2 MG/ML
INJECTION INTRAMUSCULAR; INTRAVENOUS AS NEEDED
Status: DISCONTINUED | OUTPATIENT
Start: 2019-05-13 | End: 2019-05-13 | Stop reason: SURG

## 2019-05-13 RX ORDER — MAGNESIUM HYDROXIDE 1200 MG/15ML
LIQUID ORAL AS NEEDED
Status: DISCONTINUED | OUTPATIENT
Start: 2019-05-13 | End: 2019-05-13 | Stop reason: HOSPADM

## 2019-05-13 RX ORDER — MIDAZOLAM HYDROCHLORIDE 1 MG/ML
INJECTION INTRAMUSCULAR; INTRAVENOUS AS NEEDED
Status: DISCONTINUED | OUTPATIENT
Start: 2019-05-13 | End: 2019-05-13 | Stop reason: SURG

## 2019-05-13 RX ORDER — PROPOFOL 10 MG/ML
INJECTION, EMULSION INTRAVENOUS AS NEEDED
Status: DISCONTINUED | OUTPATIENT
Start: 2019-05-13 | End: 2019-05-13 | Stop reason: SURG

## 2019-05-13 RX ORDER — FENTANYL CITRATE 50 UG/ML
INJECTION, SOLUTION INTRAMUSCULAR; INTRAVENOUS AS NEEDED
Status: DISCONTINUED | OUTPATIENT
Start: 2019-05-13 | End: 2019-05-13 | Stop reason: SURG

## 2019-05-13 RX ORDER — CEFAZOLIN SODIUM 2 G/50ML
2000 SOLUTION INTRAVENOUS ONCE
Status: COMPLETED | OUTPATIENT
Start: 2019-05-13 | End: 2019-05-13

## 2019-05-13 RX ADMIN — CEFAZOLIN SODIUM 2000 MG: 2 SOLUTION INTRAVENOUS at 12:23

## 2019-05-13 RX ADMIN — FENTANYL CITRATE 100 MCG: 50 INJECTION, SOLUTION INTRAMUSCULAR; INTRAVENOUS at 12:27

## 2019-05-13 RX ADMIN — PROPOFOL 125 MCG/KG/MIN: 10 INJECTION, EMULSION INTRAVENOUS at 12:27

## 2019-05-13 RX ADMIN — ONDANSETRON 4 MG: 2 INJECTION INTRAMUSCULAR; INTRAVENOUS at 12:30

## 2019-05-13 RX ADMIN — PROPOFOL 80 MG: 10 INJECTION, EMULSION INTRAVENOUS at 12:47

## 2019-05-13 RX ADMIN — MIDAZOLAM HYDROCHLORIDE 2 MG: 1 INJECTION, SOLUTION INTRAMUSCULAR; INTRAVENOUS at 12:23

## 2019-05-13 RX ADMIN — SODIUM CHLORIDE, SODIUM LACTATE, POTASSIUM CHLORIDE, AND CALCIUM CHLORIDE: .6; .31; .03; .02 INJECTION, SOLUTION INTRAVENOUS at 12:23

## 2019-05-24 ENCOUNTER — OFFICE VISIT (OUTPATIENT)
Dept: OBGYN CLINIC | Facility: CLINIC | Age: 64
End: 2019-05-24

## 2019-05-24 DIAGNOSIS — M65.352 TRIGGER FINGER, LEFT LITTLE FINGER: ICD-10-CM

## 2019-05-24 DIAGNOSIS — G56.02 CARPAL TUNNEL SYNDROME ON LEFT: Primary | ICD-10-CM

## 2019-05-24 PROCEDURE — 99024 POSTOP FOLLOW-UP VISIT: CPT | Performed by: PHYSICIAN ASSISTANT

## 2019-05-29 ENCOUNTER — ANESTHESIA EVENT (OUTPATIENT)
Dept: PERIOP | Facility: HOSPITAL | Age: 64
End: 2019-05-29
Payer: COMMERCIAL

## 2019-05-30 ENCOUNTER — HOSPITAL ENCOUNTER (OUTPATIENT)
Facility: HOSPITAL | Age: 64
Setting detail: OUTPATIENT SURGERY
Discharge: HOME | End: 2019-05-30
Attending: ORTHOPAEDIC SURGERY | Admitting: ORTHOPAEDIC SURGERY
Payer: COMMERCIAL

## 2019-05-30 ENCOUNTER — ANESTHESIA (OUTPATIENT)
Dept: PERIOP | Facility: HOSPITAL | Age: 64
End: 2019-05-30
Payer: COMMERCIAL

## 2019-05-30 VITALS
TEMPERATURE: 97.5 F | OXYGEN SATURATION: 97 % | HEART RATE: 63 BPM | RESPIRATION RATE: 18 BRPM | WEIGHT: 181 LBS | DIASTOLIC BLOOD PRESSURE: 91 MMHG | HEIGHT: 69 IN | BODY MASS INDEX: 26.81 KG/M2 | SYSTOLIC BLOOD PRESSURE: 156 MMHG

## 2019-05-30 PROCEDURE — 64721 CARPAL TUNNEL SURGERY: CPT | Performed by: ORTHOPAEDIC SURGERY

## 2019-05-30 RX ORDER — LIDOCAINE HYDROCHLORIDE 10 MG/ML
INJECTION, SOLUTION INFILTRATION; PERINEURAL AS NEEDED
Status: DISCONTINUED | OUTPATIENT
Start: 2019-05-30 | End: 2019-05-30 | Stop reason: SURG

## 2019-05-30 RX ORDER — PROPOFOL 10 MG/ML
INJECTION, EMULSION INTRAVENOUS AS NEEDED
Status: DISCONTINUED | OUTPATIENT
Start: 2019-05-30 | End: 2019-05-30 | Stop reason: SURG

## 2019-05-30 RX ORDER — CEFAZOLIN SODIUM 2 G/50ML
2000 SOLUTION INTRAVENOUS ONCE
Status: COMPLETED | OUTPATIENT
Start: 2019-05-30 | End: 2019-05-30

## 2019-05-30 RX ORDER — FENTANYL CITRATE 50 UG/ML
INJECTION, SOLUTION INTRAMUSCULAR; INTRAVENOUS AS NEEDED
Status: DISCONTINUED | OUTPATIENT
Start: 2019-05-30 | End: 2019-05-30 | Stop reason: SURG

## 2019-05-30 RX ORDER — MAGNESIUM HYDROXIDE 1200 MG/15ML
LIQUID ORAL AS NEEDED
Status: DISCONTINUED | OUTPATIENT
Start: 2019-05-30 | End: 2019-05-30 | Stop reason: HOSPADM

## 2019-05-30 RX ORDER — MIDAZOLAM HYDROCHLORIDE 1 MG/ML
INJECTION INTRAMUSCULAR; INTRAVENOUS AS NEEDED
Status: DISCONTINUED | OUTPATIENT
Start: 2019-05-30 | End: 2019-05-30 | Stop reason: SURG

## 2019-05-30 RX ORDER — PROPOFOL 10 MG/ML
INJECTION, EMULSION INTRAVENOUS CONTINUOUS PRN
Status: DISCONTINUED | OUTPATIENT
Start: 2019-05-30 | End: 2019-05-30 | Stop reason: SURG

## 2019-05-30 RX ORDER — SODIUM CHLORIDE 9 MG/ML
75 INJECTION, SOLUTION INTRAVENOUS CONTINUOUS
Status: DISCONTINUED | OUTPATIENT
Start: 2019-05-30 | End: 2019-05-30 | Stop reason: HOSPADM

## 2019-05-30 RX ADMIN — PROPOFOL 100 MCG/KG/MIN: 10 INJECTION, EMULSION INTRAVENOUS at 07:23

## 2019-05-30 RX ADMIN — CEFAZOLIN SODIUM 2000 MG: 2 SOLUTION INTRAVENOUS at 07:20

## 2019-05-30 RX ADMIN — FENTANYL CITRATE 100 MCG: 50 INJECTION, SOLUTION INTRAMUSCULAR; INTRAVENOUS at 07:23

## 2019-05-30 RX ADMIN — MIDAZOLAM HYDROCHLORIDE 2 MG: 1 INJECTION, SOLUTION INTRAMUSCULAR; INTRAVENOUS at 07:20

## 2019-05-30 RX ADMIN — PROPOFOL 20 MG: 10 INJECTION, EMULSION INTRAVENOUS at 07:24

## 2019-05-30 RX ADMIN — LIDOCAINE HYDROCHLORIDE 30 MG: 10 INJECTION, SOLUTION INFILTRATION; PERINEURAL at 07:23

## 2019-05-30 RX ADMIN — PROPOFOL 60 MG: 10 INJECTION, EMULSION INTRAVENOUS at 07:23

## 2019-05-30 RX ADMIN — SODIUM CHLORIDE 75 ML/HR: 0.9 INJECTION, SOLUTION INTRAVENOUS at 06:29

## 2019-06-12 ENCOUNTER — OFFICE VISIT (OUTPATIENT)
Dept: OBGYN CLINIC | Facility: CLINIC | Age: 64
End: 2019-06-12

## 2019-06-12 VITALS
DIASTOLIC BLOOD PRESSURE: 70 MMHG | BODY MASS INDEX: 27.05 KG/M2 | SYSTOLIC BLOOD PRESSURE: 144 MMHG | WEIGHT: 183.2 LBS | HEART RATE: 62 BPM

## 2019-06-12 DIAGNOSIS — Z98.890 S/P CARPAL TUNNEL RELEASE: Primary | ICD-10-CM

## 2019-06-12 PROCEDURE — 99024 POSTOP FOLLOW-UP VISIT: CPT | Performed by: PHYSICIAN ASSISTANT

## 2019-09-30 ENCOUNTER — CONSULT (OUTPATIENT)
Dept: OBGYN CLINIC | Facility: CLINIC | Age: 64
End: 2019-09-30
Payer: COMMERCIAL

## 2019-09-30 ENCOUNTER — APPOINTMENT (OUTPATIENT)
Dept: RADIOLOGY | Facility: CLINIC | Age: 64
End: 2019-09-30
Payer: COMMERCIAL

## 2019-09-30 VITALS
BODY MASS INDEX: 26.51 KG/M2 | DIASTOLIC BLOOD PRESSURE: 70 MMHG | WEIGHT: 179 LBS | HEART RATE: 67 BPM | HEIGHT: 69 IN | SYSTOLIC BLOOD PRESSURE: 123 MMHG

## 2019-09-30 DIAGNOSIS — M65.321 TRIGGER INDEX FINGER OF RIGHT HAND: ICD-10-CM

## 2019-09-30 DIAGNOSIS — M65.352 TRIGGER LITTLE FINGER OF LEFT HAND: Primary | ICD-10-CM

## 2019-09-30 DIAGNOSIS — M79.642 BILATERAL HAND PAIN: ICD-10-CM

## 2019-09-30 DIAGNOSIS — M79.641 BILATERAL HAND PAIN: ICD-10-CM

## 2019-09-30 PROCEDURE — 20550 NJX 1 TENDON SHEATH/LIGAMENT: CPT | Performed by: ORTHOPAEDIC SURGERY

## 2019-09-30 PROCEDURE — 73130 X-RAY EXAM OF HAND: CPT

## 2019-09-30 PROCEDURE — 99214 OFFICE O/P EST MOD 30 MIN: CPT | Performed by: ORTHOPAEDIC SURGERY

## 2019-09-30 RX ORDER — BUPIVACAINE HYDROCHLORIDE 2.5 MG/ML
0.5 INJECTION, SOLUTION INFILTRATION; PERINEURAL
Status: COMPLETED | OUTPATIENT
Start: 2019-09-30 | End: 2019-09-30

## 2019-09-30 RX ORDER — TRIAMCINOLONE ACETONIDE 40 MG/ML
20 INJECTION, SUSPENSION INTRA-ARTICULAR; INTRAMUSCULAR
Status: COMPLETED | OUTPATIENT
Start: 2019-09-30 | End: 2019-09-30

## 2019-09-30 RX ORDER — METFORMIN HYDROCHLORIDE 750 MG/1
750 TABLET, EXTENDED RELEASE ORAL
COMMUNITY
Start: 2019-09-14 | End: 2019-10-07

## 2019-09-30 RX ADMIN — TRIAMCINOLONE ACETONIDE 20 MG: 40 INJECTION, SUSPENSION INTRA-ARTICULAR; INTRAMUSCULAR at 11:50

## 2019-09-30 RX ADMIN — BUPIVACAINE HYDROCHLORIDE 0.5 ML: 2.5 INJECTION, SOLUTION INFILTRATION; PERINEURAL at 11:50
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
## 2019-10-07 ENCOUNTER — TELEPHONE (OUTPATIENT)
Dept: OBGYN CLINIC | Facility: CLINIC | Age: 64
End: 2019-10-07

## 2019-10-07 NOTE — PRE-PROCEDURE INSTRUCTIONS
Pre-Surgery Instructions:   Medication Instructions    ASPIRIN 81 PO Instructed patient per Anesthesia Guidelines   atorvastatin (LIPITOR) 40 mg tablet Instructed patient per Anesthesia Guidelines   Cholecalciferol (VITAMIN D) 2000 UNITS CAPS Instructed patient per Anesthesia Guidelines   fluticasone (FLONASE) 50 mcg/act nasal spray Instructed patient per Anesthesia Guidelines   GLUCOSAMINE CHONDROITIN COMPLX PO Instructed patient per Anesthesia Guidelines   lansoprazole (PREVACID) 30 mg capsule Instructed patient per Anesthesia Guidelines   losartan (COZAAR) 50 mg tablet Instructed patient per Anesthesia Guidelines   LUMIGAN 0 01 % ophthalmic drops Instructed patient per Anesthesia Guidelines   METFORMIN HCL PO Instructed patient per Anesthesia Guidelines   metoprolol succinate (TOPROL-XL) 25 mg 24 hr tablet Instructed patient per Anesthesia Guidelines  Pre op instructions given  Pt to take blood sugar,metoprolol succinate,losartan,lansoprazole the am of the surgery   wife Vic Lazo [phone]  My Surgical Experience    The following information was developed to assist you to prepare for your operation  What do I need to do before coming to the hospital?   Arrange for a responsible person to drive you to and from the hospital    Arrange care for your children at home  Children are not allowed in the recovery areas of the hospital   Plan to wear clothing that is easy to put on and take off  If you are having shoulder surgery, wear a shirt that buttons or zippers in the front  Bathing  o Shower the evening before and the morning of your surgery with an antibacterial soap   Please refer to the Pre Op Showering Instructions for Surgery Patients Sheet   o Remove nail polish and all body piercing jewelry  o Do not shave any body part for at least 24 hours before surgery-this includes face, arms, legs and upper body  Food  o Nothing to eat or drink after midnight the night before your surgery  This includes candy and chewing gum  o Exception: If your surgery is after 12:00pm (noon), you may have clear liquids such as 7-Up®, ginger ale, apple or cranberry juice, Jell-O®, water, or clear broth until 8:00 am  o Do not drink milk or juice with pulp on the morning before surgery  o Do not drink alcohol 24 hours before surgery  Medicine  o Follow instructions you received from your surgeon about which medicines you may take on the day of surgery  o If instructed to take medicine on the morning of surgery, take pills with just a small sip of water  Call your prescribing doctor for specific infroamtion on what to do if you take insulin    What should I bring to the hospital?    Bring:  Sammy Jean Claude or a walker, if you have them, for foot or knee surgery   A list of the daily medicines, vitamins, minerals, herbals and nutritional supplements you take  Include the dosages of medicines and the time you take them each day   Glasses, dentures or hearing aids   Minimal clothing; you will be wearing hospital sleepwear   Photo ID; required to verify your identity   If you have a Living Will or Power of , bring a copy of the documents   If you have an ostomy, bring an extra pouch and any supplies you use    Do not bring   Medicines or inhalers   Money, valuables or jewelry    What other information should I know about the day of surgery?  Notify your surgeons if you develop a cold, sore throat, cough, fever, rash or any other illness   Report to the Ambulatory Surgical/Same Day Surgery Unit   You will be instructed to stop at Registration only if you have not been pre-registered   Inform your  fi they do not stay that they will be asked by the staff to leave a phone number where they can be reached   Be available to be reached before surgery   In the event the operating room schedule changes, you may be asked to come in earlier or later than expected    *It is important to tell your doctor and others involved in your health care if you are taking or have been taking any non-prescription drugs, vitamins, minerals, herbals or other nutritional supplements   Any of these may interact with some food or medicines and cause a reaction

## 2019-10-09 ENCOUNTER — ANESTHESIA EVENT (OUTPATIENT)
Dept: PERIOP | Facility: HOSPITAL | Age: 64
End: 2019-10-09
Payer: COMMERCIAL

## 2019-10-10 ENCOUNTER — ANESTHESIA (OUTPATIENT)
Dept: PERIOP | Facility: HOSPITAL | Age: 64
End: 2019-10-10
Payer: COMMERCIAL

## 2019-10-10 ENCOUNTER — HOSPITAL ENCOUNTER (OUTPATIENT)
Facility: HOSPITAL | Age: 64
Setting detail: OUTPATIENT SURGERY
Discharge: HOME | End: 2019-10-10
Attending: ORTHOPAEDIC SURGERY | Admitting: ORTHOPAEDIC SURGERY
Payer: COMMERCIAL

## 2019-10-10 VITALS
OXYGEN SATURATION: 98 % | WEIGHT: 178.25 LBS | TEMPERATURE: 97.2 F | DIASTOLIC BLOOD PRESSURE: 90 MMHG | BODY MASS INDEX: 26.4 KG/M2 | RESPIRATION RATE: 18 BRPM | SYSTOLIC BLOOD PRESSURE: 155 MMHG | HEART RATE: 59 BPM | HEIGHT: 69 IN

## 2019-10-10 PROCEDURE — 26055 INCISE FINGER TENDON SHEATH: CPT | Performed by: ORTHOPAEDIC SURGERY

## 2019-10-10 PROCEDURE — 26055 INCISE FINGER TENDON SHEATH: CPT | Performed by: PHYSICIAN ASSISTANT

## 2019-10-10 RX ORDER — FENTANYL CITRATE 50 UG/ML
INJECTION, SOLUTION INTRAMUSCULAR; INTRAVENOUS AS NEEDED
Status: DISCONTINUED | OUTPATIENT
Start: 2019-10-10 | End: 2019-10-10 | Stop reason: SURG

## 2019-10-10 RX ORDER — SODIUM CHLORIDE, SODIUM LACTATE, POTASSIUM CHLORIDE, CALCIUM CHLORIDE 600; 310; 30; 20 MG/100ML; MG/100ML; MG/100ML; MG/100ML
125 INJECTION, SOLUTION INTRAVENOUS CONTINUOUS
Status: DISCONTINUED | OUTPATIENT
Start: 2019-10-10 | End: 2019-10-10 | Stop reason: HOSPADM

## 2019-10-10 RX ORDER — MIDAZOLAM HYDROCHLORIDE 1 MG/ML
INJECTION INTRAMUSCULAR; INTRAVENOUS AS NEEDED
Status: DISCONTINUED | OUTPATIENT
Start: 2019-10-10 | End: 2019-10-10 | Stop reason: SURG

## 2019-10-10 RX ORDER — FENTANYL CITRATE/PF 50 MCG/ML
25 SYRINGE (ML) INJECTION
Status: DISCONTINUED | OUTPATIENT
Start: 2019-10-10 | End: 2019-10-10 | Stop reason: HOSPADM

## 2019-10-10 RX ORDER — LIDOCAINE HYDROCHLORIDE 10 MG/ML
INJECTION, SOLUTION EPIDURAL; INFILTRATION; INTRACAUDAL; PERINEURAL AS NEEDED
Status: DISCONTINUED | OUTPATIENT
Start: 2019-10-10 | End: 2019-10-10 | Stop reason: SURG

## 2019-10-10 RX ORDER — PROPOFOL 10 MG/ML
INJECTION, EMULSION INTRAVENOUS AS NEEDED
Status: DISCONTINUED | OUTPATIENT
Start: 2019-10-10 | End: 2019-10-10 | Stop reason: SURG

## 2019-10-10 RX ORDER — MAGNESIUM HYDROXIDE 1200 MG/15ML
LIQUID ORAL AS NEEDED
Status: DISCONTINUED | OUTPATIENT
Start: 2019-10-10 | End: 2019-10-10 | Stop reason: HOSPADM

## 2019-10-10 RX ORDER — PROPOFOL 10 MG/ML
INJECTION, EMULSION INTRAVENOUS CONTINUOUS PRN
Status: DISCONTINUED | OUTPATIENT
Start: 2019-10-10 | End: 2019-10-10 | Stop reason: SURG

## 2019-10-10 RX ADMIN — LIDOCAINE HYDROCHLORIDE 50 MG: 10 INJECTION, SOLUTION EPIDURAL; INFILTRATION; INTRACAUDAL; PERINEURAL at 09:23

## 2019-10-10 RX ADMIN — SODIUM CHLORIDE, SODIUM LACTATE, POTASSIUM CHLORIDE, AND CALCIUM CHLORIDE: .6; .31; .03; .02 INJECTION, SOLUTION INTRAVENOUS at 09:46

## 2019-10-10 RX ADMIN — FENTANYL CITRATE 50 MCG: 50 INJECTION, SOLUTION INTRAMUSCULAR; INTRAVENOUS at 09:26

## 2019-10-10 RX ADMIN — FENTANYL CITRATE 50 MCG: 50 INJECTION, SOLUTION INTRAMUSCULAR; INTRAVENOUS at 09:23

## 2019-10-10 RX ADMIN — SODIUM CHLORIDE, SODIUM LACTATE, POTASSIUM CHLORIDE, AND CALCIUM CHLORIDE: .6; .31; .03; .02 INJECTION, SOLUTION INTRAVENOUS at 09:18

## 2019-10-10 RX ADMIN — PROPOFOL 50 MG: 10 INJECTION, EMULSION INTRAVENOUS at 09:23

## 2019-10-10 RX ADMIN — SODIUM CHLORIDE, SODIUM LACTATE, POTASSIUM CHLORIDE, AND CALCIUM CHLORIDE 125 ML/HR: .6; .31; .03; .02 INJECTION, SOLUTION INTRAVENOUS at 07:44

## 2019-10-10 RX ADMIN — MIDAZOLAM HYDROCHLORIDE 2 MG: 1 INJECTION, SOLUTION INTRAMUSCULAR; INTRAVENOUS at 09:18

## 2019-10-10 RX ADMIN — PROPOFOL 80 MCG/KG/MIN: 10 INJECTION, EMULSION INTRAVENOUS at 09:23

## 2019-10-10 NOTE — OP NOTE
OPERATIVE REPORT  PATIENT NAME: Rebecca Wilkinson    :  1955  MRN: [id number]  Pt Location: [facility] OR ROOM 02    SURGERY DATE: 10/10/2019    Surgeon(s) and Role:     * Moise Hester MD - Primary     * Jordan Rudolph PA-C - [address] [place] [address] Jcarlos A  T C  [facility] 2nd assistant    Preop Diagnosis:  Trigger finger, right index finger [M65 321]    Post-Op Diagnosis Codes:     * Trigger finger, right index finger [M65 321]    Procedure(s) (LRB):  RELEASE TRIGGER FINGER (Right)    Specimen(s):  * No specimens in log *    Estimated Blood Loss:   Minimal    Drains:  * No LDAs found *    Anesthesia Type:   IV Sedation with local Anesthesia after alcohol prep as a digital block with a mixture 1% lidocaine plain and 0 5% Marcaine plain by the attending surgeon    Operative Indications:  Trigger finger, right index finger Carmen Amaro is a 70-year-old male who has been suffering with right index finger pain and triggering  He had failed conservative measures and wished to undergo a right hand index finger trigger release  The risks are inclusive of but not limited to infection, stiffness, nerve injury causing numbness pain and weakness, worsening of symptoms, persistence or recurrence of triggering, failure to regain full strength and ability, and need for further surgery  He wished to go ahead and understood these risks  Operative Findings:  Right hand demonstrated index finger with a swollen flexor tendons  We were able to readily release these tendons at the A1 pulley completely demonstrating on visualization that we had complete release the A1 pulley as well as on palpation  The tendon did have very small amounts of longitudinal tearing but no significant tearing requiring repair  The finger did glide smoothly into flexion and extension at the end of the procedure  Complications:   None    Procedure and Technique:  Elizabeth Snare was taken to the operating room and placed supine on the OR table  He had sedation and a surgical time-out was taken  The right hand was prepped with alcohol and a digital block was administered as described above  The right upper extremity was then prepped and draped in usual sterile fashion  We exsanguinated and inflated tourniquet  Total tourniquet time was 9 minutes  Under loupe magnification, we made a longitudinal incision over the A1 pulley with a 15 blade at the index finger  We then carefully dissected past subcutaneous adipose and neurovascular structures to the A1 pulley and retracted the neurovascular structures out of harm's way  We then incised longitudinally with a deep 15 blade the A1 pulley and completed the release proximally distally demonstrated complete release of the A1 pulley  We were able to pull out of the wound the flexor tendons and inspected them finding only small longitudinal tears and some swelling  We ranged the finger found the range nicely  We then irrigated and closed with a 4-0 nylon suture  We applied dry, sterile dressings with an Ace bandage  The tourniquet was let down  He tolerated the procedure well and transferred to recovery room stable condition    He will follow up in 10-14 days for suture removal    I was present for the entire procedure    Patient Disposition:  PACU     SIGNATURE: Magdiel Salinas MD  DATE: October 10, 2019  TIME: 9:42 AM

## 2019-10-10 NOTE — INTERVAL H&P NOTE
H&P reviewed  After examining the patient I find no changes in the patients condition since the H&P had been written      Vitals:    10/10/19 0731   BP: (!) 183/78   Pulse: 59   Resp: 18   Temp: (!) 96 5 °F (35 8 °C)   SpO2: 99%

## 2019-10-10 NOTE — ANESTHESIA POSTPROCEDURE EVALUATION
Post-Op Assessment Note    CV Status:  Stable  Pain Score: 0    Pain management: adequate     Mental Status:  Alert and awake   Hydration Status:  Euvolemic   PONV Controlled:  Controlled   Airway Patency:  Patent   Post Op Vitals Reviewed: Yes      Staff: CRNA           BP  147/78   Temp  97 1   Pulse  68nsr   Resp   12   SpO2   99% ra

## 2019-10-10 NOTE — ANESTHESIA PREPROCEDURE EVALUATION
Review of Systems/Medical History  Patient summary reviewed  Chart reviewed  History of anesthetic complications PONV    Cardiovascular  Hyperlipidemia, Hypertension , CAD , Cardiac stents  History of percutaneous transluminal coronary angioplasty, DVT   Pulmonary  Smoker ex-smoker  , Sleep apnea CPAP,        GI/Hepatic    GERD , Esophageal disease leon esophagus,  Hiatal hernia,        Prostatic disorder, benign prostatic hyperplasia       Endo/Other  Diabetes ,      GYN       Hematology    Coagulation disorder (factor V leiden deficiency) ,    Musculoskeletal  Back pain (s/p discectomy) ,   Comment: B/l carpel tunnel syndrome      Neurology   Psychology     Chronic pain,            Physical Exam    Airway    Mallampati score: II  TM Distance: >3 FB  Neck ROM: full     Dental       Cardiovascular  Rhythm: regular, Rate: normal,     Pulmonary  Breath sounds clear to auscultation,     Other Findings        Anesthesia Plan  ASA Score- 3     Anesthesia Type- IV sedation with anesthesia with ASA Monitors  Additional Monitors:   Airway Plan:         Plan Factors-    Induction- intravenous  Postoperative Plan-     Informed Consent- Anesthetic plan and risks discussed with patient  I personally reviewed this patient with the CRNA  Discussed and agreed on the Anesthesia Plan with the CRNA  Montana Manning

## 2019-10-10 NOTE — PERIOPERATIVE NURSING NOTE
Patient 's right hand fingers warm, mobile, sensate  Denies any pain, wife at bedside, taking fluids by mouth  Ice to area, elevated on pillow

## 2019-10-10 NOTE — DISCHARGE INSTRUCTIONS
INSTRUCTIONS FOLLOWING YOUR HAND/WRIST SURGERY     First follow-up visit after surgery   Call the office the day after your surgery & make an appointment for 10-14 days after surgery to see Dr Mendel Amaya  At that appointment, your sutures will be removed  Dressing & Wound Care   If you are in a splint, keep the original dressing on and dry until you are seen by Dr Mendel Amaya  You may take a bath by covering your arm in a garbage bag or other waterproof bag  Tie it securely with rubber bands and duct tape  Keep your dressing as clean as possible     If you are only in a soft dressing, you may take the dressing off at 3 days after your operation; at which point you may shower and get the incision wet  Do not soak the wound in dish, bath, or pool water until after the stitches have been removed     Swelling and Discoloration   You will notice some swelling of your hand - this is normal  Elevate your hand above the level of your heart as much as possible for the first week  When you sleep, place your hand on several pillows  You may notice a bluish discoloration of your fingers  This is also normal  This discoloration may change from blue to purple to green to yellow, then will slowly go away over a few weeks time  Discomfort   You will experience some pain and discomfort after surgery  By the third day, this pain usually decreases significantly  You will be given a prescription for pain medication  Take the medication as prescribed  If the discomfort is mild, you can take 1 or 2 Tylenol every 4 - 6 hours as needed, instead of the prescribed pain medication  Temperature   A temperature of up to 101  5ºF is common for the first 2 days after surgery  If your temperature is elevated above 101 5º F, call the office  Exercise   Unless instructed otherwise, you may gently open and close your fingers  Do not perform any exercises that cause you to sweat   Sweating may increase complications with your incision  We hope this answers many of your questions regarding your surgery  These are only guidelines however  If you have any other concerns or questions at any time, do not hesitate to call the office [phone]

## 2019-10-23 ENCOUNTER — OFFICE VISIT (OUTPATIENT)
Dept: OBGYN CLINIC | Facility: CLINIC | Age: 64
End: 2019-10-23

## 2019-10-23 VITALS
HEIGHT: 69 IN | SYSTOLIC BLOOD PRESSURE: 138 MMHG | WEIGHT: 180.8 LBS | HEART RATE: 62 BPM | DIASTOLIC BLOOD PRESSURE: 72 MMHG | BODY MASS INDEX: 26.78 KG/M2

## 2019-10-23 DIAGNOSIS — Z98.890 STATUS POST TRIGGER FINGER RELEASE: Primary | ICD-10-CM

## 2019-10-23 PROCEDURE — 99024 POSTOP FOLLOW-UP VISIT: CPT | Performed by: PHYSICIAN ASSISTANT

## 2019-10-23 RX ORDER — METFORMIN HYDROCHLORIDE 750 MG/1
TABLET, EXTENDED RELEASE ORAL
Refills: 3 | COMMUNITY
Start: 2019-10-08 | End: 2022-03-30
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
## 2020-02-20 ENCOUNTER — OFFICE VISIT (OUTPATIENT)
Dept: OTOLARYNGOLOGY | Facility: CLINIC | Age: 65
End: 2020-02-20
Payer: COMMERCIAL

## 2020-02-20 VITALS
BODY MASS INDEX: 26.81 KG/M2 | TEMPERATURE: 98.5 F | RESPIRATION RATE: 12 BRPM | HEIGHT: 69 IN | SYSTOLIC BLOOD PRESSURE: 138 MMHG | DIASTOLIC BLOOD PRESSURE: 84 MMHG | WEIGHT: 181 LBS | HEART RATE: 73 BPM

## 2020-02-20 DIAGNOSIS — J34.2 NASAL SEPTAL DEVIATION: Primary | ICD-10-CM

## 2020-02-20 DIAGNOSIS — R04.0 EPISTAXIS: ICD-10-CM

## 2020-02-20 PROCEDURE — 99203 OFFICE O/P NEW LOW 30 MIN: CPT | Performed by: OTOLARYNGOLOGY
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
## 2020-06-29 ENCOUNTER — APPOINTMENT (OUTPATIENT)
Dept: RADIOLOGY | Facility: CLINIC | Age: 65
End: 2020-06-29
Payer: COMMERCIAL

## 2020-06-29 ENCOUNTER — TRANSCRIBE ORDERS (OUTPATIENT)
Dept: URGENT CARE | Facility: CLINIC | Age: 65
End: 2020-06-29

## 2020-06-29 DIAGNOSIS — M16.11 PRIMARY OSTEOARTHRITIS OF RIGHT HIP: ICD-10-CM

## 2020-06-29 DIAGNOSIS — M16.11 PRIMARY OSTEOARTHRITIS OF RIGHT HIP: Primary | ICD-10-CM

## 2020-06-29 PROCEDURE — 73521 X-RAY EXAM HIPS BI 2 VIEWS: CPT

## 2021-03-01 DIAGNOSIS — Z23 ENCOUNTER FOR IMMUNIZATION: ICD-10-CM

## 2021-03-10 ENCOUNTER — HOSPITAL ENCOUNTER (OUTPATIENT)
Dept: VASCULAR ULTRASOUND | Facility: HOSPITAL | Age: 66
Discharge: HOME | End: 2021-03-10
Payer: COMMERCIAL

## 2021-03-10 DIAGNOSIS — I80.9 PHLEBITIS: ICD-10-CM

## 2021-03-10 PROCEDURE — 93971 EXTREMITY STUDY: CPT

## 2021-03-10 PROCEDURE — 93971 EXTREMITY STUDY: CPT | Performed by: SURGERY

## 2021-03-12 ENCOUNTER — APPOINTMENT (EMERGENCY)
Dept: RADIOLOGY | Facility: HOSPITAL | Age: 66
End: 2021-03-12
Payer: COMMERCIAL

## 2021-03-12 ENCOUNTER — HOSPITAL ENCOUNTER (EMERGENCY)
Facility: HOSPITAL | Age: 66
Discharge: HOME | End: 2021-03-12
Attending: EMERGENCY MEDICINE | Admitting: EMERGENCY MEDICINE
Payer: COMMERCIAL

## 2021-03-12 VITALS
HEART RATE: 64 BPM | TEMPERATURE: 97.6 F | DIASTOLIC BLOOD PRESSURE: 70 MMHG | OXYGEN SATURATION: 97 % | RESPIRATION RATE: 16 BRPM | SYSTOLIC BLOOD PRESSURE: 157 MMHG | BODY MASS INDEX: 26.05 KG/M2 | WEIGHT: 176.4 LBS

## 2021-03-12 DIAGNOSIS — R20.2 PARESTHESIAS: Primary | ICD-10-CM

## 2021-03-12 LAB
ALBUMIN SERPL BCP-MCNC: 4.1 G/DL (ref 3.5–5)
ALP SERPL-CCNC: 63 U/L (ref 46–116)
ALT SERPL W P-5'-P-CCNC: 39 U/L (ref 12–78)
ANION GAP SERPL CALCULATED.3IONS-SCNC: 6 MMOL/L (ref 4–13)
APTT PPP: 27 SECONDS (ref 23–37)
AST SERPL W P-5'-P-CCNC: 23 U/L (ref 5–45)
ATRIAL RATE: 68 BPM
BASOPHILS # BLD AUTO: 0.08 THOUSANDS/ΜL (ref 0–0.1)
BASOPHILS NFR BLD AUTO: 1 % (ref 0–1)
BILIRUB SERPL-MCNC: 0.4 MG/DL (ref 0.2–1)
BUN SERPL-MCNC: 20 MG/DL (ref 5–25)
CALCIUM SERPL-MCNC: 9.8 MG/DL (ref 8.3–10.1)
CHLORIDE SERPL-SCNC: 101 MMOL/L (ref 100–108)
CO2 SERPL-SCNC: 29 MMOL/L (ref 21–32)
CREAT SERPL-MCNC: 1.42 MG/DL (ref 0.6–1.3)
EOSINOPHIL # BLD AUTO: 0.43 THOUSAND/ΜL (ref 0–0.61)
EOSINOPHIL NFR BLD AUTO: 4 % (ref 0–6)
ERYTHROCYTE [DISTWIDTH] IN BLOOD BY AUTOMATED COUNT: 13.2 % (ref 11.6–15.1)
GFR SERPL CREATININE-BSD FRML MDRD: 51 ML/MIN/1.73SQ M
GLUCOSE SERPL-MCNC: 107 MG/DL (ref 65–140)
GLUCOSE SERPL-MCNC: 116 MG/DL (ref 65–140)
HCT VFR BLD AUTO: 47.1 % (ref 36.5–49.3)
HGB BLD-MCNC: 14.6 G/DL (ref 12–17)
IMM GRANULOCYTES # BLD AUTO: 0.04 THOUSAND/UL (ref 0–0.2)
IMM GRANULOCYTES NFR BLD AUTO: 0 % (ref 0–2)
INR PPP: 0.91 (ref 0.84–1.19)
LYMPHOCYTES # BLD AUTO: 3.82 THOUSANDS/ΜL (ref 0.6–4.47)
LYMPHOCYTES NFR BLD AUTO: 39 % (ref 14–44)
MAGNESIUM SERPL-MCNC: 2.1 MG/DL (ref 1.6–2.6)
MCH RBC QN AUTO: 28.3 PG (ref 26.8–34.3)
MCHC RBC AUTO-ENTMCNC: 31 G/DL (ref 31.4–37.4)
MCV RBC AUTO: 92 FL (ref 82–98)
MONOCYTES # BLD AUTO: 1.06 THOUSAND/ΜL (ref 0.17–1.22)
MONOCYTES NFR BLD AUTO: 11 % (ref 4–12)
NEUTROPHILS # BLD AUTO: 4.42 THOUSANDS/ΜL (ref 1.85–7.62)
NEUTS SEG NFR BLD AUTO: 45 % (ref 43–75)
NRBC BLD AUTO-RTO: 0 /100 WBCS
P AXIS: -6 DEGREES
PLATELET # BLD AUTO: 210 THOUSANDS/UL (ref 149–390)
PMV BLD AUTO: 12.3 FL (ref 8.9–12.7)
POTASSIUM SERPL-SCNC: 3.9 MMOL/L (ref 3.5–5.3)
PR INTERVAL: 164 MS
PROT SERPL-MCNC: 7.7 G/DL (ref 6.4–8.2)
PROTHROMBIN TIME: 12.2 SECONDS (ref 11.6–14.5)
QRS AXIS: 44 DEGREES
QRSD INTERVAL: 144 MS
QT INTERVAL: 444 MS
QTC INTERVAL: 472 MS
RBC # BLD AUTO: 5.15 MILLION/UL (ref 3.88–5.62)
SODIUM SERPL-SCNC: 136 MMOL/L (ref 136–145)
T WAVE AXIS: 32 DEGREES
TROPONIN I SERPL-MCNC: <0.02 NG/ML
VENTRICULAR RATE: 68 BPM
WBC # BLD AUTO: 9.85 THOUSAND/UL (ref 4.31–10.16)

## 2021-03-12 PROCEDURE — 36415 COLL VENOUS BLD VENIPUNCTURE: CPT | Performed by: EMERGENCY MEDICINE

## 2021-03-12 PROCEDURE — 93010 ELECTROCARDIOGRAM REPORT: CPT | Performed by: INTERNAL MEDICINE

## 2021-03-12 PROCEDURE — 82948 REAGENT STRIP/BLOOD GLUCOSE: CPT

## 2021-03-12 PROCEDURE — 93005 ELECTROCARDIOGRAM TRACING: CPT

## 2021-03-12 PROCEDURE — 70450 CT HEAD/BRAIN W/O DYE: CPT

## 2021-03-12 PROCEDURE — 84484 ASSAY OF TROPONIN QUANT: CPT | Performed by: EMERGENCY MEDICINE

## 2021-03-12 PROCEDURE — 83735 ASSAY OF MAGNESIUM: CPT | Performed by: EMERGENCY MEDICINE

## 2021-03-12 PROCEDURE — 99284 EMERGENCY DEPT VISIT MOD MDM: CPT

## 2021-03-12 PROCEDURE — G1004 CDSM NDSC: HCPCS

## 2021-03-12 PROCEDURE — 85730 THROMBOPLASTIN TIME PARTIAL: CPT | Performed by: EMERGENCY MEDICINE

## 2021-03-12 PROCEDURE — 85610 PROTHROMBIN TIME: CPT | Performed by: EMERGENCY MEDICINE

## 2021-03-12 PROCEDURE — 99284 EMERGENCY DEPT VISIT MOD MDM: CPT | Performed by: EMERGENCY MEDICINE

## 2021-03-12 PROCEDURE — 85025 COMPLETE CBC W/AUTO DIFF WBC: CPT | Performed by: EMERGENCY MEDICINE

## 2021-03-12 PROCEDURE — 80053 COMPREHEN METABOLIC PANEL: CPT | Performed by: EMERGENCY MEDICINE
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
## 2021-03-18 ENCOUNTER — APPOINTMENT (EMERGENCY)
Dept: RADIOLOGY | Facility: HOSPITAL | Age: 66
DRG: 068 | End: 2021-03-18
Payer: COMMERCIAL

## 2021-03-18 ENCOUNTER — HOSPITAL ENCOUNTER (INPATIENT)
Facility: HOSPITAL | Age: 66
LOS: 1 days | DRG: 068 | End: 2021-03-20
Attending: EMERGENCY MEDICINE | Admitting: INTERNAL MEDICINE
Payer: COMMERCIAL

## 2021-03-18 DIAGNOSIS — I16.0 HYPERTENSIVE URGENCY: ICD-10-CM

## 2021-03-18 DIAGNOSIS — G45.9 TIA (TRANSIENT ISCHEMIC ATTACK): Primary | ICD-10-CM

## 2021-03-18 DIAGNOSIS — I65.22 CAROTID STENOSIS, LEFT: ICD-10-CM

## 2021-03-18 DIAGNOSIS — I25.10 CORONARY ARTERY DISEASE: ICD-10-CM

## 2021-03-18 LAB
ALBUMIN SERPL BCP-MCNC: 4 G/DL (ref 3.5–5)
ALP SERPL-CCNC: 64 U/L (ref 46–116)
ALT SERPL W P-5'-P-CCNC: 45 U/L (ref 12–78)
ANION GAP SERPL CALCULATED.3IONS-SCNC: 10 MMOL/L (ref 4–13)
AST SERPL W P-5'-P-CCNC: 21 U/L (ref 5–45)
ATRIAL RATE: 62 BPM
BASOPHILS # BLD AUTO: 0.08 THOUSANDS/ΜL (ref 0–0.1)
BASOPHILS NFR BLD AUTO: 1 % (ref 0–1)
BILIRUB SERPL-MCNC: 0.4 MG/DL (ref 0.2–1)
BUN SERPL-MCNC: 21 MG/DL (ref 5–25)
CALCIUM SERPL-MCNC: 9.6 MG/DL (ref 8.3–10.1)
CHLORIDE SERPL-SCNC: 103 MMOL/L (ref 100–108)
CO2 SERPL-SCNC: 28 MMOL/L (ref 21–32)
CREAT SERPL-MCNC: 1.31 MG/DL (ref 0.6–1.3)
EOSINOPHIL # BLD AUTO: 0.32 THOUSAND/ΜL (ref 0–0.61)
EOSINOPHIL NFR BLD AUTO: 4 % (ref 0–6)
ERYTHROCYTE [DISTWIDTH] IN BLOOD BY AUTOMATED COUNT: 13.2 % (ref 11.6–15.1)
GFR SERPL CREATININE-BSD FRML MDRD: 57 ML/MIN/1.73SQ M
GLUCOSE SERPL-MCNC: 156 MG/DL (ref 65–140)
GLUCOSE SERPL-MCNC: 88 MG/DL (ref 65–140)
GLUCOSE SERPL-MCNC: 94 MG/DL (ref 65–140)
HCT VFR BLD AUTO: 47.3 % (ref 36.5–49.3)
HGB BLD-MCNC: 14.5 G/DL (ref 12–17)
IMM GRANULOCYTES # BLD AUTO: 0.03 THOUSAND/UL (ref 0–0.2)
IMM GRANULOCYTES NFR BLD AUTO: 0 % (ref 0–2)
LYMPHOCYTES # BLD AUTO: 2.56 THOUSANDS/ΜL (ref 0.6–4.47)
LYMPHOCYTES NFR BLD AUTO: 32 % (ref 14–44)
MAGNESIUM SERPL-MCNC: 2.1 MG/DL (ref 1.6–2.6)
MCH RBC QN AUTO: 28.4 PG (ref 26.8–34.3)
MCHC RBC AUTO-ENTMCNC: 30.7 G/DL (ref 31.4–37.4)
MCV RBC AUTO: 93 FL (ref 82–98)
MONOCYTES # BLD AUTO: 0.68 THOUSAND/ΜL (ref 0.17–1.22)
MONOCYTES NFR BLD AUTO: 9 % (ref 4–12)
NEUTROPHILS # BLD AUTO: 4.34 THOUSANDS/ΜL (ref 1.85–7.62)
NEUTS SEG NFR BLD AUTO: 54 % (ref 43–75)
NRBC BLD AUTO-RTO: 0 /100 WBCS
NT-PROBNP SERPL-MCNC: 92 PG/ML
P AXIS: 65 DEGREES
PLATELET # BLD AUTO: 205 THOUSANDS/UL (ref 149–390)
PMV BLD AUTO: 12.2 FL (ref 8.9–12.7)
POTASSIUM SERPL-SCNC: 4.2 MMOL/L (ref 3.5–5.3)
PR INTERVAL: 166 MS
PROT SERPL-MCNC: 7.3 G/DL (ref 6.4–8.2)
QRS AXIS: 16 DEGREES
QRSD INTERVAL: 142 MS
QT INTERVAL: 450 MS
QTC INTERVAL: 456 MS
RBC # BLD AUTO: 5.11 MILLION/UL (ref 3.88–5.62)
SODIUM SERPL-SCNC: 141 MMOL/L (ref 136–145)
T WAVE AXIS: 26 DEGREES
TROPONIN I SERPL-MCNC: <0.02 NG/ML
VENTRICULAR RATE: 62 BPM
WBC # BLD AUTO: 8.01 THOUSAND/UL (ref 4.31–10.16)

## 2021-03-18 PROCEDURE — 93005 ELECTROCARDIOGRAM TRACING: CPT

## 2021-03-18 PROCEDURE — 99220 PR INITIAL OBSERVATION CARE/DAY 70 MINUTES: CPT | Performed by: INTERNAL MEDICINE

## 2021-03-18 PROCEDURE — 93010 ELECTROCARDIOGRAM REPORT: CPT | Performed by: INTERNAL MEDICINE

## 2021-03-18 PROCEDURE — 99284 EMERGENCY DEPT VISIT MOD MDM: CPT | Performed by: EMERGENCY MEDICINE

## 2021-03-18 PROCEDURE — 70496 CT ANGIOGRAPHY HEAD: CPT

## 2021-03-18 PROCEDURE — 80053 COMPREHEN METABOLIC PANEL: CPT | Performed by: EMERGENCY MEDICINE

## 2021-03-18 PROCEDURE — 83735 ASSAY OF MAGNESIUM: CPT | Performed by: EMERGENCY MEDICINE

## 2021-03-18 PROCEDURE — 71045 X-RAY EXAM CHEST 1 VIEW: CPT

## 2021-03-18 PROCEDURE — 99245 OFF/OP CONSLTJ NEW/EST HI 55: CPT | Performed by: PSYCHIATRY & NEUROLOGY

## 2021-03-18 PROCEDURE — 85025 COMPLETE CBC W/AUTO DIFF WBC: CPT | Performed by: EMERGENCY MEDICINE

## 2021-03-18 PROCEDURE — 82948 REAGENT STRIP/BLOOD GLUCOSE: CPT

## 2021-03-18 PROCEDURE — 84484 ASSAY OF TROPONIN QUANT: CPT | Performed by: EMERGENCY MEDICINE

## 2021-03-18 PROCEDURE — 70498 CT ANGIOGRAPHY NECK: CPT

## 2021-03-18 PROCEDURE — G1004 CDSM NDSC: HCPCS

## 2021-03-18 PROCEDURE — 36415 COLL VENOUS BLD VENIPUNCTURE: CPT | Performed by: EMERGENCY MEDICINE

## 2021-03-18 PROCEDURE — 99285 EMERGENCY DEPT VISIT HI MDM: CPT

## 2021-03-18 PROCEDURE — 83880 ASSAY OF NATRIURETIC PEPTIDE: CPT | Performed by: EMERGENCY MEDICINE

## 2021-03-18 PROCEDURE — 96374 THER/PROPH/DIAG INJ IV PUSH: CPT

## 2021-03-18 PROCEDURE — 84484 ASSAY OF TROPONIN QUANT: CPT | Performed by: INTERNAL MEDICINE

## 2021-03-18 RX ORDER — ATORVASTATIN CALCIUM 40 MG/1
40 TABLET, FILM COATED ORAL EVERY EVENING
Status: DISCONTINUED | OUTPATIENT
Start: 2021-03-18 | End: 2021-03-18

## 2021-03-18 RX ORDER — ATORVASTATIN CALCIUM 40 MG/1
80 TABLET, FILM COATED ORAL ONCE
Status: COMPLETED | OUTPATIENT
Start: 2021-03-18 | End: 2021-03-18

## 2021-03-18 RX ORDER — HYDRALAZINE HYDROCHLORIDE 20 MG/ML
5 INJECTION INTRAMUSCULAR; INTRAVENOUS EVERY 6 HOURS PRN
Status: DISCONTINUED | OUTPATIENT
Start: 2021-03-18 | End: 2021-03-20 | Stop reason: HOSPADM

## 2021-03-18 RX ORDER — ATORVASTATIN CALCIUM 80 MG/1
80 TABLET, FILM COATED ORAL EVERY EVENING
Status: DISCONTINUED | OUTPATIENT
Start: 2021-03-19 | End: 2021-03-20 | Stop reason: HOSPADM

## 2021-03-18 RX ORDER — ATORVASTATIN CALCIUM 40 MG/1
80 TABLET, FILM COATED ORAL
Status: DISCONTINUED | OUTPATIENT
Start: 2021-03-18 | End: 2021-03-18

## 2021-03-18 RX ORDER — FLUTICASONE PROPIONATE 50 MCG
2 SPRAY, SUSPENSION (ML) NASAL DAILY
Status: DISCONTINUED | OUTPATIENT
Start: 2021-03-19 | End: 2021-03-20 | Stop reason: HOSPADM

## 2021-03-18 RX ORDER — PANTOPRAZOLE SODIUM 40 MG/1
40 TABLET, DELAYED RELEASE ORAL
Status: DISCONTINUED | OUTPATIENT
Start: 2021-03-19 | End: 2021-03-20 | Stop reason: HOSPADM

## 2021-03-18 RX ORDER — ACETAMINOPHEN 325 MG/1
650 TABLET ORAL EVERY 6 HOURS PRN
Status: DISCONTINUED | OUTPATIENT
Start: 2021-03-18 | End: 2021-03-20 | Stop reason: HOSPADM

## 2021-03-18 RX ORDER — CLOPIDOGREL BISULFATE 75 MG/1
600 TABLET ORAL ONCE
Status: COMPLETED | OUTPATIENT
Start: 2021-03-18 | End: 2021-03-18

## 2021-03-18 RX ORDER — HYDRALAZINE HYDROCHLORIDE 20 MG/ML
10 INJECTION INTRAMUSCULAR; INTRAVENOUS ONCE
Status: COMPLETED | OUTPATIENT
Start: 2021-03-18 | End: 2021-03-18

## 2021-03-18 RX ORDER — CLOPIDOGREL BISULFATE 75 MG/1
75 TABLET ORAL DAILY
Status: DISCONTINUED | OUTPATIENT
Start: 2021-03-19 | End: 2021-03-20 | Stop reason: HOSPADM

## 2021-03-18 RX ORDER — ASPIRIN 81 MG/1
81 TABLET, CHEWABLE ORAL DAILY
Status: DISCONTINUED | OUTPATIENT
Start: 2021-03-19 | End: 2021-03-20 | Stop reason: HOSPADM

## 2021-03-18 RX ORDER — MELATONIN
1000 DAILY
Status: DISCONTINUED | OUTPATIENT
Start: 2021-03-19 | End: 2021-03-20 | Stop reason: HOSPADM

## 2021-03-18 RX ORDER — LOSARTAN POTASSIUM 50 MG/1
50 TABLET ORAL EVERY MORNING
Status: DISCONTINUED | OUTPATIENT
Start: 2021-03-19 | End: 2021-03-19

## 2021-03-18 RX ORDER — METOPROLOL SUCCINATE 25 MG/1
25 TABLET, EXTENDED RELEASE ORAL DAILY
Status: DISCONTINUED | OUTPATIENT
Start: 2021-03-19 | End: 2021-03-20 | Stop reason: HOSPADM

## 2021-03-18 RX ORDER — ASPIRIN 81 MG/1
324 TABLET, CHEWABLE ORAL ONCE
Status: COMPLETED | OUTPATIENT
Start: 2021-03-18 | End: 2021-03-18

## 2021-03-18 RX ORDER — ONDANSETRON 2 MG/ML
4 INJECTION INTRAMUSCULAR; INTRAVENOUS EVERY 6 HOURS PRN
Status: DISCONTINUED | OUTPATIENT
Start: 2021-03-18 | End: 2021-03-20 | Stop reason: HOSPADM

## 2021-03-18 RX ADMIN — CLOPIDOGREL BISULFATE 600 MG: 75 TABLET ORAL at 14:16

## 2021-03-18 RX ADMIN — ASPIRIN 81 MG CHEWABLE TABLET 324 MG: 81 TABLET CHEWABLE at 14:17

## 2021-03-18 RX ADMIN — INSULIN LISPRO 1 UNITS: 100 INJECTION, SOLUTION INTRAVENOUS; SUBCUTANEOUS at 21:49

## 2021-03-18 RX ADMIN — IOHEXOL 85 ML: 350 INJECTION, SOLUTION INTRAVENOUS at 14:39

## 2021-03-18 RX ADMIN — HYDRALAZINE HYDROCHLORIDE 10 MG: 20 INJECTION INTRAMUSCULAR; INTRAVENOUS at 14:11

## 2021-03-18 RX ADMIN — ACETAMINOPHEN 650 MG: 325 TABLET, FILM COATED ORAL at 19:54

## 2021-03-18 RX ADMIN — ATORVASTATIN CALCIUM 80 MG: 40 TABLET, FILM COATED ORAL at 14:17

## 2021-03-18 RX ADMIN — BIMATOPROST 1 DROP: 0.1 SOLUTION/ DROPS OPHTHALMIC at 21:49

## 2021-03-18 NOTE — ASSESSMENT & PLAN NOTE
Status post PCI x1 in 2017  Patient complaining of some exertional shortness of breath  Will check serial cardiac enzymes  Cardiology evaluation  Will get 2D echo to rule out significant wall motion abnormalities
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
## 2021-03-18 NOTE — ASSESSMENT & PLAN NOTE
Patient presented with intermittent right hand numbness, right leg numbness  Patient had CT scan of the brain on 03/12 which showed no acute intracranial abnormality  CT of the head and neck showed severe stenosis of the left ICA approximately 1 2 centimeter distal to the carotid bifurcation secondary to atheromatous plaque  Minimal luminal diameter of 1 millimeter corresponding to approximately 70% stenosis  Will check 2D echo with bubble study  Patient will be continued on baby aspirin 81 milligram p o  Daily  Patient's loaded with Plavix 600 milligram in the ED  Continue Plavix 75 milligram p o   Daily  Will check MRI of the brain  2D echo with bubble study  Check hemoglobin A1c and fasting lipid panel  Will get PT/OT and speech therapy  Neurology consultation

## 2021-03-18 NOTE — PLAN OF CARE
Problem: Potential for Falls  Goal: Patient will remain free of falls  Description: INTERVENTIONS:  - Assess patient frequently for physical needs  -  Identify cognitive and physical deficits and behaviors that affect risk of falls    -  [place] fall precautions as indicated by assessment   - Educate patient/family on patient safety including physical limitations  - Instruct patient to call for assistance with activity based on assessment  - Modify environment to reduce risk of injury  - Consider OT/PT consult to assist with strengthening/mobility  Outcome: Progressing

## 2021-03-18 NOTE — ASSESSMENT & PLAN NOTE
Lab Results   Component Value Date    HGBA1C 6 8 (H) 08/06/2018       Recent Labs     03/18/21  1621   POCGLU 88       Blood Sugar Average: Last 72 hrs:  (P) 88   Check hemoglobin A1c  Patient is on metformin which will be held at the current time as patient got contrast  Patient will be on Humalog sliding scale with Accu-Cheks q a c   And hs

## 2021-03-18 NOTE — ASSESSMENT & PLAN NOTE
Patient's blood pressure initially in the ED was 226/96 and persistently elevated with systolic in 162Y  Continue Toprol-XL 25 milligram p o  Daily and losartan 50 milligram p o  Daily  A low permissive hypertension to keep systolic blood pressure more than 170  Will order hydralazine p r n   For SBP more than 170
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
## 2021-03-19 ENCOUNTER — APPOINTMENT (OUTPATIENT)
Dept: RADIOLOGY | Facility: HOSPITAL | Age: 66
DRG: 068 | End: 2021-03-19
Payer: COMMERCIAL

## 2021-03-19 ENCOUNTER — APPOINTMENT (OUTPATIENT)
Dept: NON INVASIVE DIAGNOSTICS | Facility: HOSPITAL | Age: 66
DRG: 068 | End: 2021-03-19
Payer: COMMERCIAL

## 2021-03-19 PROBLEM — I65.22 LEFT CAROTID ARTERY STENOSIS: Status: ACTIVE | Noted: 2021-03-19

## 2021-03-19 LAB
ANION GAP SERPL CALCULATED.3IONS-SCNC: 8 MMOL/L (ref 4–13)
BUN SERPL-MCNC: 21 MG/DL (ref 5–25)
CALCIUM SERPL-MCNC: 9.2 MG/DL (ref 8.3–10.1)
CHLORIDE SERPL-SCNC: 105 MMOL/L (ref 100–108)
CHOLEST SERPL-MCNC: 104 MG/DL (ref 50–200)
CO2 SERPL-SCNC: 26 MMOL/L (ref 21–32)
CREAT SERPL-MCNC: 1.25 MG/DL (ref 0.6–1.3)
EST. AVERAGE GLUCOSE BLD GHB EST-MCNC: 160 MG/DL
FLUAV RNA RESP QL NAA+PROBE: NEGATIVE
FLUBV RNA RESP QL NAA+PROBE: NEGATIVE
GFR SERPL CREATININE-BSD FRML MDRD: 60 ML/MIN/1.73SQ M
GLUCOSE SERPL-MCNC: 129 MG/DL (ref 65–140)
GLUCOSE SERPL-MCNC: 132 MG/DL (ref 65–140)
GLUCOSE SERPL-MCNC: 144 MG/DL (ref 65–140)
GLUCOSE SERPL-MCNC: 154 MG/DL (ref 65–140)
GLUCOSE SERPL-MCNC: 174 MG/DL (ref 65–140)
HBA1C MFR BLD: 7.2 %
HDLC SERPL-MCNC: 36 MG/DL
LDLC SERPL CALC-MCNC: 48 MG/DL (ref 0–100)
POTASSIUM SERPL-SCNC: 3.8 MMOL/L (ref 3.5–5.3)
RSV RNA RESP QL NAA+PROBE: NEGATIVE
SARS-COV-2 RNA RESP QL NAA+PROBE: NEGATIVE
SODIUM SERPL-SCNC: 139 MMOL/L (ref 136–145)
TRIGL SERPL-MCNC: 98 MG/DL

## 2021-03-19 PROCEDURE — 93306 TTE W/DOPPLER COMPLETE: CPT | Performed by: INTERNAL MEDICINE

## 2021-03-19 PROCEDURE — 93880 EXTRACRANIAL BILAT STUDY: CPT | Performed by: SURGERY

## 2021-03-19 PROCEDURE — NC001 PR NO CHARGE: Performed by: PHYSICIAN ASSISTANT

## 2021-03-19 PROCEDURE — 82948 REAGENT STRIP/BLOOD GLUCOSE: CPT

## 2021-03-19 PROCEDURE — 99233 SBSQ HOSP IP/OBS HIGH 50: CPT | Performed by: INTERNAL MEDICINE

## 2021-03-19 PROCEDURE — 97161 PT EVAL LOW COMPLEX 20 MIN: CPT

## 2021-03-19 PROCEDURE — 0241U HB NFCT DS VIR RESP RNA 4 TRGT: CPT | Performed by: PHYSICIAN ASSISTANT

## 2021-03-19 PROCEDURE — 70551 MRI BRAIN STEM W/O DYE: CPT

## 2021-03-19 PROCEDURE — 80061 LIPID PANEL: CPT | Performed by: INTERNAL MEDICINE

## 2021-03-19 PROCEDURE — 99254 IP/OBS CNSLTJ NEW/EST MOD 60: CPT | Performed by: INTERNAL MEDICINE

## 2021-03-19 PROCEDURE — 92610 EVALUATE SWALLOWING FUNCTION: CPT

## 2021-03-19 PROCEDURE — 99255 IP/OBS CONSLTJ NEW/EST HI 80: CPT | Performed by: PHYSICIAN ASSISTANT

## 2021-03-19 PROCEDURE — G1004 CDSM NDSC: HCPCS

## 2021-03-19 PROCEDURE — 93306 TTE W/DOPPLER COMPLETE: CPT

## 2021-03-19 PROCEDURE — 83036 HEMOGLOBIN GLYCOSYLATED A1C: CPT | Performed by: INTERNAL MEDICINE

## 2021-03-19 PROCEDURE — 80048 BASIC METABOLIC PNL TOTAL CA: CPT | Performed by: INTERNAL MEDICINE

## 2021-03-19 PROCEDURE — 93880 EXTRACRANIAL BILAT STUDY: CPT

## 2021-03-19 RX ORDER — LOSARTAN POTASSIUM 50 MG/1
100 TABLET ORAL EVERY MORNING
Status: DISCONTINUED | OUTPATIENT
Start: 2021-03-20 | End: 2021-03-20 | Stop reason: HOSPADM

## 2021-03-19 RX ORDER — LORATADINE 10 MG/1
10 TABLET ORAL DAILY
Status: DISCONTINUED | OUTPATIENT
Start: 2021-03-19 | End: 2021-03-20 | Stop reason: HOSPADM

## 2021-03-19 RX ORDER — GUAIFENESIN/DEXTROMETHORPHAN 100-10MG/5
10 SYRUP ORAL EVERY 4 HOURS PRN
Status: DISCONTINUED | OUTPATIENT
Start: 2021-03-19 | End: 2021-03-20 | Stop reason: HOSPADM

## 2021-03-19 RX ADMIN — BIMATOPROST 1 DROP: 0.1 SOLUTION/ DROPS OPHTHALMIC at 21:53

## 2021-03-19 RX ADMIN — Medication 1000 UNITS: at 09:45

## 2021-03-19 RX ADMIN — CLOPIDOGREL BISULFATE 75 MG: 75 TABLET ORAL at 09:45

## 2021-03-19 RX ADMIN — PANTOPRAZOLE SODIUM 40 MG: 40 TABLET, DELAYED RELEASE ORAL at 05:29

## 2021-03-19 RX ADMIN — METOPROLOL SUCCINATE 25 MG: 25 TABLET, EXTENDED RELEASE ORAL at 09:45

## 2021-03-19 RX ADMIN — ATORVASTATIN CALCIUM 80 MG: 80 TABLET ORAL at 17:10

## 2021-03-19 RX ADMIN — LOSARTAN POTASSIUM 50 MG: 50 TABLET, FILM COATED ORAL at 09:45

## 2021-03-19 RX ADMIN — INSULIN LISPRO 1 UNITS: 100 INJECTION, SOLUTION INTRAVENOUS; SUBCUTANEOUS at 21:54

## 2021-03-19 RX ADMIN — ASPIRIN 81 MG CHEWABLE TABLET 81 MG: 81 TABLET CHEWABLE at 09:45

## 2021-03-19 RX ADMIN — FLUTICASONE PROPIONATE 2 SPRAY: 50 SPRAY, METERED NASAL at 09:48

## 2021-03-19 RX ADMIN — ENOXAPARIN SODIUM 40 MG: 40 INJECTION SUBCUTANEOUS at 09:45

## 2021-03-19 RX ADMIN — TOBRAMYCIN AND DEXAMETHASONE 0.5 INCH: 3; 1 OINTMENT OPHTHALMIC at 21:54

## 2021-03-19 RX ADMIN — INSULIN LISPRO 1 UNITS: 100 INJECTION, SOLUTION INTRAVENOUS; SUBCUTANEOUS at 12:41

## 2021-03-19 RX ADMIN — LORATADINE 10 MG: 10 TABLET ORAL at 21:54
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
## 2021-03-19 NOTE — SPEECH THERAPY NOTE
Speech Language/Pathology  Inpatient Bedside Swallow Evaluation        Patient Name: Renzo ROJO Date: 3/19/2021     Problem List  Principal Problem:    TIA (transient ischemic attack)  Active Problems:    Diabetes mellitus ([address] )    Coronary artery disease    Hypertensive urgency    Hyperlipidemia    Left carotid artery stenosis         Past Surgical History  Past Surgical History:   Procedure Laterality Date    ANGIOPLASTY  07/06/2017    one stent    BACK SURGERY  2012    discectomy    CARDIAC CATHETERIZATION  07/06/2017    angioplasty-one stent    CARPAL TUNNEL RELEASE Bilateral     COLONOSCOPY      COLONOSCOPY N/A 2/16/2017    Procedure: COLONOSCOPY;  Surgeon: Heather Baires MD;  Location: [place] [address] GI LAB; Service:     CORONARY ANGIOPLASTY WITH STENT PLACEMENT  07/2017    ESOPHAGOGASTRODUODENOSCOPY N/A 2/16/2017    Procedure: ESOPHAGOGASTRODUODENOSCOPY (EGD); Surgeon: Heather Baires MD;  Location: [place] [address] GI LAB; Service:     FRACTURE SURGERY  2005    sternum    NE INCISE FINGER TENDON SHEATH Right 10/10/2019    Procedure: RELEASE TRIGGER FINGER;  Surgeon: Burak Chang MD;  Location: [address] [place] [address];  Service: Orthopedics    NE REVISE MEDIAN N/CARPAL TUNNEL SURG Left 5/13/2019    Procedure: RELEASE CARPAL TUNNEL;  Surgeon: Burak Chang MD;  Location: [address] [place] [address];  Service: Orthopedics    NE REVISE MEDIAN N/CARPAL TUNNEL SURG Right 5/30/2019    Procedure: RELEASE CARPAL TUNNEL;  Surgeon: Burak Chang MD;  Location: [address] [place] [address];  Service: Orthopedics    NE SHLDR ARTHROSCOP,SURG,W/ROTAT CUFF REPR Right 8/23/2018    Procedure: RIGHT SHOULDER REPAIR ROTATOR CUFF  ARTHROSCOPIC, SUACROMIAL DECOMPRESION, REMOVAL LOOSE BODY;  Surgeon: Burak Chang MD;  Location: [address] [place] [address];  Service: Orthopedics    ROTATOR CUFF REPAIR         Summary    Pt presents with speech, language, and swallowing skills grossly WFL and at baseline        Recommendations:   Diet: regular diet and thin liquids   Meds: as tolerated   Frequent Oral care: 4x/day  Aspiration precautions and compensatory swallowing strategies (new and carryover): none  Other Referrals/Recommendations/ considerations: none       Current Medical Status  Pt is a 72 y o  male who presented to [place] [address] with past medical history of multiple medical problems including CAD status post PCI, hypertension, hyperlipidemia, GERD, diabetes, obstructive sleep apnea, BPH who presents with right arm numbness extending into the face  Patient reports he had right fingers and hand numbness on March 12, 2021 and present to the ED and had CT scan of the brain which was negative and patient was discharged home  Later couple of days ago patient had some numbness in his right lower extremity along with some weakness which resolved in about half an hour  Patient today had again numbness of the right hand extending onto the right arm into the right side of face and right side of tongue which lasted for about 10 minutes  Patient otherwise denies any dizziness, chest pain, shortness of breath, palpitations  Patient also reports constant frontal headache over the past 2 weeks  Patient also reports that has some blurred vision and does not feel well especially when he closes his eyes  As the symptoms were more extensive patient present to the ED today  In the ED patient's initial blood pressure was significantly elevated to 226/96  Patient's creatinine was slightly  CT of the head and neck showed severe stenosis of the left ICA secondary to atheromatous plaque          Pt  Is now being referred for a swallowing assessment due to CVA  Past medical history:   Please see H&P for details    Recent special Studies:  3/12/21: CT head wo contrast: No acute intracranial abnormality is seen  3/18/21: Chest Xray: No acute cardiopulmonary disease      3/18/21: CTA head neck w wo contrast: Severe stenosis left ICA approximately 1 2 cm distal to the carotid bifurcation secondary to atheromatous plaque  The minimal luminal diameter is 1 mm corresponding to approximately 70% stenosis by NASCET criteria although this is likely underestimated   as the distal ICA is a small caliber vessel  Social/Education/Vocational Hx:  Pt lives with family  Wife  Has 1 cat  Works full time  Is a   Reportedly independent in all ADLs prior to this hospitalization  Swallow Information   Current Risks for Dysphagia & Aspiration: CVA  Current Symptoms/Concerns: rule out  Current Diet: regular diet and thin liquids   Pre-hospitalization Diet: regular diet and thin liquids    Baseline Assessment   Behavior/Cognition: alert    Speech/Language Status: able to participate in conversation and able to follow commands  Pt  Denies any difficulties with word retrieval, memory, or speech intelligibility  He was oriented x 5  Able to follow directions  Did not exhibit s/s of aphasia or dysarthria  Patient Positioning: upright in recliner     Swallow Mechanism Exam   Facial: symmetrical  Labial: WFL  Lingual: WFL  Velum: unable to visualize  Mandible: adequate ROM  Dentition: adequate  Vocal quality:clear/adequate   Volitional Cough: strong/productive   Respiratory: room air    Consistencies Assessed and Performance   Consistencies Administered: thin liquids, puree, soft solids and hard solids  Pt  Was seen with breakfast meal which consisted of an omelet, english muffin, fresh fruit cup, coffee, juice  Oral Stage: WFL  Orientation to be able to self feed was appropriate  No assistance required  Mastication was [facility]  Bolus formation, and transfer were adequate and timely with materials trialed today  No significant oral residue was noted  No pocketing was noted  No overt s/s of reduced oral control  Pharyngeal Stage: [place]  Swallow initiation appeared prompt  Laryngeal rise was palpated and judged to be within functional limits   No coughing, throat clearing, change in vocal quality, or respiratory status noted with today's materials        Esophageal Concerns: none reported      Results Reviewed with: patient   Dysphagia Goals: none at this time    Discharge recommendation: no follow up needed for swallowing    Speech Therapy Prognosis   Prognosis: good      Gabriela North MS CCC-SLP  Speech Language Pathologist  Available via [address] [place] [address] License # [id number]  [address] # [id number]

## 2021-03-19 NOTE — PLAN OF CARE
Problem: Potential for Falls  Goal: Patient will remain free of falls  Description: INTERVENTIONS:  - Assess patient frequently for physical needs  -  Identify cognitive and physical deficits and behaviors that affect risk of falls  -  [place] fall precautions as indicated by assessment   - Educate patient/family on patient safety including physical limitations  - Instruct patient to call for assistance with activity based on assessment  - Modify environment to reduce risk of injury  - Consider OT/PT consult to assist with strengthening/mobility  3/19/2021 1724 by Loreta Juárez RN  Outcome: Progressing  3/19/2021 1530 by Loreta Juárez RN  Outcome: Progressing     Problem: Neurological Deficit  Goal: Neurological status is stable or improving  Description: Interventions:  - Monitor and assess patient's level of consciousness, motor function, sensory function, and level of assistance needed for ADLs  - Monitor and report changes from baseline  Collaborate with interdisciplinary team to initiate plan and implement interventions as ordered  - Provide and maintain a safe environment  - Consider seizure precautions  - Consider fall precautions  - Consider aspiration precautions  - Consider bleeding precautions  3/19/2021 1724 by Loreta Juárez RN  Outcome: Progressing  3/19/2021 1530 by Loreta Juárez RN  Outcome: Progressing     Problem: Activity Intolerance/Impaired Mobility  Goal: Mobility/activity is maintained at optimum level for patient  Description: Interventions:  - Assess and monitor patient  barriers to mobility and need for assistive/adaptive devices  - Assess patient's emotional response to limitations  - Collaborate with interdisciplinary team and initiate plans and interventions as ordered  - Encourage independent activity per ability   - Maintain proper body alignment  - Perform active/passive rom as tolerated/ordered    - Plan activities to conserve energy   - Turn patient as appropriate  3/19/2021 1724 by Joie Andino RN  Outcome: Progressing  3/19/2021 1530 by Joie Andino RN  Outcome: Progressing     Problem: Communication Impairment  Goal: Ability to express needs and understand communication  Description: Assess patient's communication skills and ability to understand information  Patient will demonstrate use of effective communication techniques, alternative methods of communication and understanding even if not able to speak  - Encourage communication and provide alternate methods of communication as needed  - Collaborate with case management/ for discharge needs  - Include patient/family/caregiver in decisions related to communication  3/19/2021 1724 by Joie Andino RN  Outcome: Progressing  3/19/2021 1530 by Joie Andino RN  Outcome: Progressing     Problem: Potential for Aspiration  Goal: Non-ventilated patient's risk of aspiration is minimized  Description: Assess and monitor vital signs, respiratory status, and labs (WBC)  Monitor for signs of aspiration (tachypnea, cough, rales, wheezing, cyanosis, fever)  - Assess and monitor patient's ability to swallow  - Place patient up in chair to eat if possible  - HOB up at 90 degrees to eat if unable to get patient up into chair   - Supervise patient during oral intake  - Instruct patient/ family to take small bites  - Instruct patient/ family to take small single sips when taking liquids  - Follow patient-specific strategies generated by speech pathologist   3/19/2021 1724 by Joie Andino RN  Outcome: Progressing  3/19/2021 1530 by Joie Andino RN  Outcome: Progressing     Problem: Nutrition  Goal: Nutrition/Hydration status is improving  Description: Monitor and assess patient's nutrition/hydration status for malnutrition (ex- brittle hair, bruises, dry skin, pale skin and conjunctiva, muscle wasting, smooth red tongue, and disorientation)   Collaborate with interdisciplinary team and initiate plan and interventions as ordered  Monitor patient's weight and dietary intake as ordered or per policy  Utilize nutrition screening tool and intervene per policy  Determine patient's food preferences and provide high-protein, high-caloric foods as appropriate  - Assist patient with eating   - Allow adequate time for meals   - Encourage patient to take dietary supplement as ordered  - Collaborate with clinical nutritionist   - Include patient/family/caregiver in decisions related to nutrition    3/19/2021 1724 by Jessica Bey RN  Outcome: Progressing  3/19/2021 1530 by Jessica Bey RN  Outcome: Progressing

## 2021-03-19 NOTE — PLAN OF CARE
Problem: Potential for Falls  Goal: Patient will remain free of falls  Description: INTERVENTIONS:  - Assess patient frequently for physical needs  -  Identify cognitive and physical deficits and behaviors that affect risk of falls  -  [place] fall precautions as indicated by assessment   - Educate patient/family on patient safety including physical limitations  - Instruct patient to call for assistance with activity based on assessment  - Modify environment to reduce risk of injury  - Consider OT/PT consult to assist with strengthening/mobility  Outcome: Progressing     Problem: Neurological Deficit  Goal: Neurological status is stable or improving  Description: Interventions:  - Monitor and assess patient's level of consciousness, motor function, sensory function, and level of assistance needed for ADLs  - Monitor and report changes from baseline  Collaborate with interdisciplinary team to initiate plan and implement interventions as ordered  - Provide and maintain a safe environment  - Consider seizure precautions  - Consider fall precautions  - Consider aspiration precautions  - Consider bleeding precautions  Outcome: Progressing     Problem: Activity Intolerance/Impaired Mobility  Goal: Mobility/activity is maintained at optimum level for patient  Description: Interventions:  - Assess and monitor patient  barriers to mobility and need for assistive/adaptive devices  - Assess patient's emotional response to limitations  - Collaborate with interdisciplinary team and initiate plans and interventions as ordered  - Encourage independent activity per ability   - Maintain proper body alignment  - Perform active/passive rom as tolerated/ordered    - Plan activities to conserve energy   - Turn patient as appropriate  Outcome: Progressing     Problem: Communication Impairment  Goal: Ability to express needs and understand communication  Description: Assess patient's communication skills and ability to understand information  Patient will demonstrate use of effective communication techniques, alternative methods of communication and understanding even if not able to speak  - Encourage communication and provide alternate methods of communication as needed  - Collaborate with case management/ for discharge needs  - Include patient/family/caregiver in decisions related to communication  Outcome: Progressing     Problem: Potential for Aspiration  Goal: Non-ventilated patient's risk of aspiration is minimized  Description: Assess and monitor vital signs, respiratory status, and labs (WBC)  Monitor for signs of aspiration (tachypnea, cough, rales, wheezing, cyanosis, fever)  - Assess and monitor patient's ability to swallow  - Place patient up in chair to eat if possible  - HOB up at 90 degrees to eat if unable to get patient up into chair   - Supervise patient during oral intake  - Instruct patient/ family to take small bites  - Instruct patient/ family to take small single sips when taking liquids  - Follow patient-specific strategies generated by speech pathologist   Outcome: Progressing     Problem: Nutrition  Goal: Nutrition/Hydration status is improving  Description: Monitor and assess patient's nutrition/hydration status for malnutrition (ex- brittle hair, bruises, dry skin, pale skin and conjunctiva, muscle wasting, smooth red tongue, and disorientation)  Collaborate with interdisciplinary team and initiate plan and interventions as ordered  Monitor patient's weight and dietary intake as ordered or per policy  Utilize nutrition screening tool and intervene per policy  Determine patient's food preferences and provide high-protein, high-caloric foods as appropriate  - Assist patient with eating   - Allow adequate time for meals   - Encourage patient to take dietary supplement as ordered    - Collaborate with clinical nutritionist   - Include patient/family/caregiver in decisions related to nutrition    Outcome: Progressing

## 2021-03-19 NOTE — ASSESSMENT & PLAN NOTE
Lab Results   Component Value Date    HGBA1C 6 8 (H) 08/06/2018       Recent Labs     03/18/21  1621 03/18/21  2139 03/19/21  0650   POCGLU 88 156* 129       Blood Sugar Average: Last 72 hrs:  (P) 992 [id number]   --stable  -continue to optimize BS control for prevention of vascular disease and SSI  -management per primary medical service
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
## 2021-03-19 NOTE — ASSESSMENT & PLAN NOTE
-brain MRI pending  -continue DAPT and statin  -permissive hypertension  -neurology consult appreciated  -see plan as outlined above
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
## 2021-03-19 NOTE — ASSESSMENT & PLAN NOTE
-hx PCI/ED LCx [facility] 7/6/17  -stable without angina  -cardiology consult pending    Will request preoperative cardiac risk stratification and clearance in anticipation of carotid intervention  -continue beta-blocker, statin and ASA

## 2021-03-19 NOTE — PLAN OF CARE
Problem: Potential for Falls  Goal: Patient will remain free of falls  Description: INTERVENTIONS:  - Assess patient frequently for physical needs  -  Identify cognitive and physical deficits and behaviors that affect risk of falls  -  [place] fall precautions as indicated by assessment   - Educate patient/family on patient safety including physical limitations  - Instruct patient to call for assistance with activity based on assessment  - Modify environment to reduce risk of injury  - Consider OT/PT consult to assist with strengthening/mobility  Outcome: Progressing     Problem: Neurological Deficit  Goal: Neurological status is stable or improving  Description: Interventions:  - Monitor and assess patient's level of consciousness, motor function, sensory function, and level of assistance needed for ADLs  - Monitor and report changes from baseline  Collaborate with interdisciplinary team to initiate plan and implement interventions as ordered  - Provide and maintain a safe environment  - Consider seizure precautions  - Consider fall precautions  - Consider aspiration precautions  - Consider bleeding precautions  Outcome: Progressing     Problem: Activity Intolerance/Impaired Mobility  Goal: Mobility/activity is maintained at optimum level for patient  Description: Interventions:  - Assess and monitor patient  barriers to mobility and need for assistive/adaptive devices  - Assess patient's emotional response to limitations  - Collaborate with interdisciplinary team and initiate plans and interventions as ordered  - Encourage independent activity per ability   - Maintain proper body alignment  - Perform active/passive rom as tolerated/ordered    - Plan activities to conserve energy   - Turn patient as appropriate  Outcome: Progressing     Problem: Communication Impairment  Goal: Ability to express needs and understand communication  Description: Assess patient's communication skills and ability to understand information  Patient will demonstrate use of effective communication techniques, alternative methods of communication and understanding even if not able to speak  - Encourage communication and provide alternate methods of communication as needed  - Collaborate with case management/ for discharge needs  - Include patient/family/caregiver in decisions related to communication  Outcome: Progressing     Problem: Potential for Aspiration  Goal: Non-ventilated patient's risk of aspiration is minimized  Description: Assess and monitor vital signs, respiratory status, and labs (WBC)  Monitor for signs of aspiration (tachypnea, cough, rales, wheezing, cyanosis, fever)  - Assess and monitor patient's ability to swallow  - Place patient up in chair to eat if possible  - HOB up at 90 degrees to eat if unable to get patient up into chair   - Supervise patient during oral intake  - Instruct patient/ family to take small bites  - Instruct patient/ family to take small single sips when taking liquids  - Follow patient-specific strategies generated by speech pathologist   Outcome: Progressing     Problem: Nutrition  Goal: Nutrition/Hydration status is improving  Description: Monitor and assess patient's nutrition/hydration status for malnutrition (ex- brittle hair, bruises, dry skin, pale skin and conjunctiva, muscle wasting, smooth red tongue, and disorientation)  Collaborate with interdisciplinary team and initiate plan and interventions as ordered  Monitor patient's weight and dietary intake as ordered or per policy  Utilize nutrition screening tool and intervene per policy  Determine patient's food preferences and provide high-protein, high-caloric foods as appropriate  - Assist patient with eating   - Allow adequate time for meals   - Encourage patient to take dietary supplement as ordered    - Collaborate with clinical nutritionist   - Include patient/family/caregiver in decisions related to nutrition    Outcome: Progressing

## 2021-03-19 NOTE — PHYSICAL THERAPY NOTE
PT EVALUATION     03/19/21 1100   Note Type   Note type Evaluation   Pain Assessment   Pain Assessment Tool Pain Assessment not indicated - pt denies pain   Home Living   Type of [address] One level;Stairs to enter with rails  (Two stairs to enter)   Home Equipment   (none)   Additional Comments Patient independent without assistive device prior to admission   Prior Function   Level of [place] Independent with ADLs and functional mobility   Lives With Spouse   ADL Assistance Independent   IADLs Independent   Vocational Full time employment   Restrictions/Precautions   Other Precautions   (left Carotid dysfunction)   General   Additional Pertinent History Chart review, patient admitted with TIA  Patient with right upper extremity and lower extremity and numbness  Symptoms now resolved and patient presents as independent with gait transfers and ADLs    No skilled PT or OT needs   Family/Caregiver Present No   Cognition   Overall Cognitive Status WFL   Arousal/Participation Cooperative   Orientation Level Oriented X4   Following Commands Follows all commands and directions without difficulty   RUE Assessment   RUE Assessment WNL   LUE Assessment   LUE Assessment WNL   RLE Assessment   RLE Assessment WNL   LLE Assessment   LLE Assessment WNL   Coordination   Movements are Fluid and Coordinated 1   Bed Mobility   Supine to Sit 7  Independent   Sit to Supine 7  Independent   Transfers   Sit to Stand 7  Independent   Stand to Sit 7  Independent   Ambulation/Elevation   Gait Assistance 7  Independent   Assistive Device   (None)   Distance 120 ft with change in direction no balance loss noted   Stair Management Assistance 7  Independent   Stair Management Technique No rails   Number of Stairs 4   Balance   Static Sitting Good   Dynamic Sitting Good   Static Standing Good   Dynamic Standing Good   Ambulatory Good   Activity Tolerance   Activity Tolerance Patient tolerated treatment well   Nurse Made Aware Yes   Assessment   Assessment Patient seen for Physical Therapy evaluation  Patient admitted with TIA (transient ischemic attack)  Comorbidities affecting patient's physical performance include:CAD status post PCI, hypertension, hyperlipidemia, GERD, diabetes, obstructive sleep apnea, BPH who presents with right arm numbness extending into the face   Patient now presents is independent with gait transfers and ADLs and does not require skilled PT OT services at this time  Personal factors affecting patient at time of initial evaluation include: stairs to enter home, inability to navigate community distances, inability to navigate level surfaces without external assistance, inability to perform dynamic tasks in community, limited home support, inability to perform physical activity and inability to perform IADLS   Prior to admission, patient was independent with functional mobility without assistive device, independent with ADLS, independent with IADLS, living in a multi-level home, ambulating household distance, ambulating community distances and works full time  Please find objective findings from Physical Therapy assessment regarding body systems outlined above with impairments and limitations including no new functional deficits  The Barthel Index was used as a functional outcome tool presenting with a score of 100 today indicating no limitations of functional mobility and ADLS  Patient's clinical presentation is currently stable  as seen in patient's presentation of no new functional deficits  As demonstrated by objective findings, the assigned level of complexity for this evaluation is low  The patient's AM-PAC Basic Mobility Inpatient Short Form Raw Score is 24, Standardized Score is 57 68  A standardized score greater than 42 9 suggests the patient may benefit from discharge to home  Please also refer to the recommendation of the Physical Therapist for safe discharge planning     Goals   Patient Goals to go home soon    STG Expiration Date   (NA)   LTG Expiration Date   (NA)   Recommendation   PT Discharge Recommendation Return to previous environment with no needs   AM-PAC Basic Mobility Inpatient   Turning in Bed Without Bedrails 4   Lying on Back to Sitting on Edge of Flat Bed 4   Moving Bed to Chair 4   Standing Up From Chair 4   Walk in Room 4   Climb 3-5 Stairs 4   Basic Mobility Inpatient Raw Score 24   Basic Mobility Standardized Score 57 68   Modified Metter Scale   Modified Noel Scale 0   Barthel Index   Feeding 10   Bathing 5   Grooming Score 5   Dressing Score 10   Bladder Score 10   Bowels Score 10   Toilet Use Score 10   Transfers (Bed/Chair) Score 10   Mobility (Level Surface) Score 13   Licensure   [place] License Number  Subha Wilson PT [id number]

## 2021-03-19 NOTE — PROGRESS NOTES
Addendum:  CTA reviewed with Dr Castorena July  L carotid stenosis high grade and closer to 90% on review of images which is congruent with carotid duplex findings  L ICA velocity 619/242  Brain MRI negative for infarct  Plan for L CEA on Monday, 3/22/21 @ [facility] by Dr Castorena July  Cardiology consult appreciated and d/w Dr Neal Calle  Patient cleared to proceed with CEA and no further cardiac testing required or recommended  D/w Dr Ganesh Rosales  Will plan for transfer to [facility] on [facility] service over weekend by Sunday  COVID testing in anticipation of OR  Continue DAPT and statin  Preop orders/case request to be placed upon arrival to [facility]    D/w [facility] vascular team   Will reevaluate and d/w patient in am           [facility]  [phone]

## 2021-03-19 NOTE — OCCUPATIONAL THERAPY NOTE
OCCUPATIONAL THERAPY       03/19/21 1400   Note Type   Note type Evaluation   Cancel Reasons Other  (Pt is indep w/ ADLS and ambulation; no skilled OT needs )   Licensure   [place] License Number  [employer]cor[employer] De Olive[address], OTR/L, [address] [facility] Lic# [id number]

## 2021-03-19 NOTE — ASSESSMENT & PLAN NOTE
-stable  -permissive hypertension in the setting of TIA, possible CVA  -continue current medical regimen  -management per primary medical service

## 2021-03-19 NOTE — ASSESSMENT & PLAN NOTE
51-year-old male former smoker w/HTN, HLD, DM II, GERD, MANDI w/CPAP, CAD, s/p PCI/ED LCx '17 and Factor V Leiden deficiency with provoked LLE DVT '13 (no chronic AC) presents w/2 transient episodes of R sided numbness and associated symptoms over past week and admitted w/TIA w/high grade L carotid stenosis  Vascular surgery consulted for symptomatic L ICA stenosis  Diagnostics:  -CTA head/neck 3/18/2021:  70% proximal L ICA stenosis no significant R ICA stenosis  Mild bilateral vertebral stenosis at origin  No significant intracranial disease  Two 4 mm nodules in the posterior body of the right ventricle   -noncontrast CT head 3/12/2021:  No intracranial abnormality or infarct  -brain MRI 3/19/2021:  Pending  -TTE 3/18/2021:  Pending  -JESSI 3/10/2021:  No evidence of acute or chronic LLE DVT or superficial thrombophlebitis  Nonvascular subcutaneous nodule mid left calf    Recommendations:  -TIA w/suspected L MCA CVA, per neurology, in setting of high-grade L ICA stenosis  Brain MRI pending  -will require left carotid intervention  Timing to be determined based on results ongoing workup   -await results of brain MRI today  -TTE pending  -will obtain carotid duplex for completeness  -continue DAPT, statin  -cardiology consult pending  Would request preoperative cardiac risk stratification and clearance in anticipation of future left carotid intervention  -will discuss with Dr Storm De La Rosa and formal recommendations to follow  Thank you for allowing us to participate in the care of this patient

## 2021-03-20 ENCOUNTER — HOSPITAL ENCOUNTER (INPATIENT)
Facility: HOSPITAL | Age: 66
LOS: 5 days | Discharge: HOME | DRG: 038 | End: 2021-03-25
Attending: INTERNAL MEDICINE | Admitting: INTERNAL MEDICINE
Payer: COMMERCIAL

## 2021-03-20 VITALS
OXYGEN SATURATION: 95 % | WEIGHT: 170 LBS | SYSTOLIC BLOOD PRESSURE: 139 MMHG | HEIGHT: 69 IN | TEMPERATURE: 98.3 F | BODY MASS INDEX: 25.18 KG/M2 | HEART RATE: 73 BPM | DIASTOLIC BLOOD PRESSURE: 77 MMHG | RESPIRATION RATE: 21 BRPM

## 2021-03-20 DIAGNOSIS — I16.0 HYPERTENSIVE URGENCY: ICD-10-CM

## 2021-03-20 DIAGNOSIS — E78.5 HYPERLIPIDEMIA: ICD-10-CM

## 2021-03-20 DIAGNOSIS — I65.22 CAROTID ARTERY STENOSIS, SYMPTOMATIC, LEFT: ICD-10-CM

## 2021-03-20 DIAGNOSIS — I65.22 LEFT CAROTID ARTERY STENOSIS: Primary | ICD-10-CM

## 2021-03-20 LAB
GLUCOSE SERPL-MCNC: 124 MG/DL (ref 65–140)
GLUCOSE SERPL-MCNC: 134 MG/DL (ref 65–140)
GLUCOSE SERPL-MCNC: 150 MG/DL (ref 65–140)
GLUCOSE SERPL-MCNC: 282 MG/DL (ref 65–140)

## 2021-03-20 PROCEDURE — 82948 REAGENT STRIP/BLOOD GLUCOSE: CPT

## 2021-03-20 PROCEDURE — 99239 HOSP IP/OBS DSCHRG MGMT >30: CPT | Performed by: INTERNAL MEDICINE

## 2021-03-20 PROCEDURE — 99232 SBSQ HOSP IP/OBS MODERATE 35: CPT | Performed by: PHYSICIAN ASSISTANT

## 2021-03-20 RX ORDER — MELATONIN
1000 DAILY
Status: DISCONTINUED | OUTPATIENT
Start: 2021-03-21 | End: 2021-03-25 | Stop reason: HOSPADM

## 2021-03-20 RX ORDER — GUAIFENESIN/DEXTROMETHORPHAN 100-10MG/5
10 SYRUP ORAL EVERY 4 HOURS PRN
Status: DISCONTINUED | OUTPATIENT
Start: 2021-03-20 | End: 2021-03-20

## 2021-03-20 RX ORDER — ONDANSETRON 2 MG/ML
4 INJECTION INTRAMUSCULAR; INTRAVENOUS EVERY 6 HOURS PRN
Status: CANCELLED | OUTPATIENT
Start: 2021-03-20

## 2021-03-20 RX ORDER — LOSARTAN POTASSIUM 50 MG/1
100 TABLET ORAL EVERY MORNING
Status: DISCONTINUED | OUTPATIENT
Start: 2021-03-21 | End: 2021-03-21

## 2021-03-20 RX ORDER — CLOPIDOGREL BISULFATE 75 MG/1
75 TABLET ORAL DAILY
Status: CANCELLED | OUTPATIENT
Start: 2021-03-21

## 2021-03-20 RX ORDER — ATORVASTATIN CALCIUM 80 MG/1
80 TABLET, FILM COATED ORAL EVERY EVENING
Status: DISCONTINUED | OUTPATIENT
Start: 2021-03-21 | End: 2021-03-25 | Stop reason: HOSPADM

## 2021-03-20 RX ORDER — MELATONIN
1000 DAILY
Status: CANCELLED | OUTPATIENT
Start: 2021-03-21

## 2021-03-20 RX ORDER — ACETAMINOPHEN 325 MG/1
650 TABLET ORAL EVERY 6 HOURS PRN
Status: DISCONTINUED | OUTPATIENT
Start: 2021-03-20 | End: 2021-03-25 | Stop reason: HOSPADM

## 2021-03-20 RX ORDER — HYDRALAZINE HYDROCHLORIDE 20 MG/ML
5 INJECTION INTRAMUSCULAR; INTRAVENOUS EVERY 6 HOURS PRN
Status: DISCONTINUED | OUTPATIENT
Start: 2021-03-20 | End: 2021-03-25 | Stop reason: HOSPADM

## 2021-03-20 RX ORDER — TOBRAMYCIN AND DEXAMETHASONE 3; 1 MG/ML; MG/ML
1 SUSPENSION/ DROPS OPHTHALMIC EVERY 6 HOURS SCHEDULED
Status: DISCONTINUED | OUTPATIENT
Start: 2021-03-21 | End: 2021-03-25 | Stop reason: HOSPADM

## 2021-03-20 RX ORDER — GUAIFENESIN/DEXTROMETHORPHAN 100-10MG/5
10 SYRUP ORAL EVERY 4 HOURS PRN
Status: CANCELLED | OUTPATIENT
Start: 2021-03-20

## 2021-03-20 RX ORDER — ASPIRIN 81 MG/1
81 TABLET, CHEWABLE ORAL DAILY
Status: CANCELLED | OUTPATIENT
Start: 2021-03-21

## 2021-03-20 RX ORDER — TOBRAMYCIN AND DEXAMETHASONE 3; 1 MG/ML; MG/ML
1 SUSPENSION/ DROPS OPHTHALMIC EVERY 6 HOURS SCHEDULED
Status: CANCELLED | OUTPATIENT
Start: 2021-03-20

## 2021-03-20 RX ORDER — LORATADINE 10 MG/1
10 TABLET ORAL DAILY
Status: CANCELLED | OUTPATIENT
Start: 2021-03-21

## 2021-03-20 RX ORDER — ONDANSETRON 2 MG/ML
4 INJECTION INTRAMUSCULAR; INTRAVENOUS EVERY 6 HOURS PRN
Status: DISCONTINUED | OUTPATIENT
Start: 2021-03-20 | End: 2021-03-25 | Stop reason: HOSPADM

## 2021-03-20 RX ORDER — CLOPIDOGREL BISULFATE 75 MG/1
75 TABLET ORAL DAILY
Status: DISCONTINUED | OUTPATIENT
Start: 2021-03-21 | End: 2021-03-25 | Stop reason: HOSPADM

## 2021-03-20 RX ORDER — FLUTICASONE PROPIONATE 50 MCG
2 SPRAY, SUSPENSION (ML) NASAL DAILY
Status: CANCELLED | OUTPATIENT
Start: 2021-03-21

## 2021-03-20 RX ORDER — ACETAMINOPHEN 325 MG/1
650 TABLET ORAL EVERY 6 HOURS PRN
Status: CANCELLED | OUTPATIENT
Start: 2021-03-20

## 2021-03-20 RX ORDER — ASPIRIN 81 MG/1
81 TABLET, CHEWABLE ORAL DAILY
Status: DISCONTINUED | OUTPATIENT
Start: 2021-03-21 | End: 2021-03-22

## 2021-03-20 RX ORDER — FLUTICASONE PROPIONATE 50 MCG
2 SPRAY, SUSPENSION (ML) NASAL DAILY
Status: DISCONTINUED | OUTPATIENT
Start: 2021-03-21 | End: 2021-03-25 | Stop reason: HOSPADM

## 2021-03-20 RX ORDER — HYDRALAZINE HYDROCHLORIDE 20 MG/ML
5 INJECTION INTRAMUSCULAR; INTRAVENOUS EVERY 6 HOURS PRN
Status: CANCELLED | OUTPATIENT
Start: 2021-03-20

## 2021-03-20 RX ORDER — METOPROLOL SUCCINATE 25 MG/1
25 TABLET, EXTENDED RELEASE ORAL DAILY
Status: CANCELLED | OUTPATIENT
Start: 2021-03-21

## 2021-03-20 RX ORDER — PANTOPRAZOLE SODIUM 40 MG/1
40 TABLET, DELAYED RELEASE ORAL
Status: CANCELLED | OUTPATIENT
Start: 2021-03-21

## 2021-03-20 RX ORDER — METOPROLOL SUCCINATE 25 MG/1
25 TABLET, EXTENDED RELEASE ORAL DAILY
Status: DISCONTINUED | OUTPATIENT
Start: 2021-03-21 | End: 2021-03-25 | Stop reason: HOSPADM

## 2021-03-20 RX ORDER — TOBRAMYCIN AND DEXAMETHASONE 3; 1 MG/ML; MG/ML
1 SUSPENSION/ DROPS OPHTHALMIC EVERY 6 HOURS SCHEDULED
Status: DISCONTINUED | OUTPATIENT
Start: 2021-03-20 | End: 2021-03-20 | Stop reason: HOSPADM

## 2021-03-20 RX ORDER — ATORVASTATIN CALCIUM 80 MG/1
80 TABLET, FILM COATED ORAL EVERY EVENING
Status: CANCELLED | OUTPATIENT
Start: 2021-03-20

## 2021-03-20 RX ORDER — PANTOPRAZOLE SODIUM 40 MG/1
40 TABLET, DELAYED RELEASE ORAL
Status: DISCONTINUED | OUTPATIENT
Start: 2021-03-21 | End: 2021-03-23

## 2021-03-20 RX ORDER — LORATADINE 10 MG/1
10 TABLET ORAL DAILY
Status: DISCONTINUED | OUTPATIENT
Start: 2021-03-21 | End: 2021-03-25 | Stop reason: HOSPADM

## 2021-03-20 RX ORDER — LOSARTAN POTASSIUM 50 MG/1
100 TABLET ORAL EVERY MORNING
Status: CANCELLED | OUTPATIENT
Start: 2021-03-21

## 2021-03-20 RX ADMIN — ATORVASTATIN CALCIUM 80 MG: 80 TABLET ORAL at 17:02

## 2021-03-20 RX ADMIN — FLUTICASONE PROPIONATE 2 SPRAY: 50 SPRAY, METERED NASAL at 08:36

## 2021-03-20 RX ADMIN — ENOXAPARIN SODIUM 40 MG: 40 INJECTION SUBCUTANEOUS at 08:32

## 2021-03-20 RX ADMIN — LORATADINE 10 MG: 10 TABLET ORAL at 08:33

## 2021-03-20 RX ADMIN — INSULIN LISPRO 4 UNITS: 100 INJECTION, SOLUTION INTRAVENOUS; SUBCUTANEOUS at 13:05

## 2021-03-20 RX ADMIN — PANTOPRAZOLE SODIUM 40 MG: 40 TABLET, DELAYED RELEASE ORAL at 06:45

## 2021-03-20 RX ADMIN — INSULIN LISPRO 1 UNITS: 100 INJECTION, SOLUTION INTRAVENOUS; SUBCUTANEOUS at 22:39

## 2021-03-20 RX ADMIN — BIMATOPROST 1 DROP: 0.1 SOLUTION/ DROPS OPHTHALMIC at 22:40

## 2021-03-20 RX ADMIN — METOPROLOL SUCCINATE 25 MG: 25 TABLET, EXTENDED RELEASE ORAL at 08:33

## 2021-03-20 RX ADMIN — ASPIRIN 81 MG CHEWABLE TABLET 81 MG: 81 TABLET CHEWABLE at 08:33

## 2021-03-20 RX ADMIN — LOSARTAN POTASSIUM 100 MG: 50 TABLET, FILM COATED ORAL at 08:33

## 2021-03-20 RX ADMIN — CLOPIDOGREL BISULFATE 75 MG: 75 TABLET ORAL at 08:33

## 2021-03-20 RX ADMIN — Medication 1000 UNITS: at 08:33

## 2021-03-20 NOTE — PROGRESS NOTES
Chelsea 45  Progress Note - Obed Key 1955, 72 y o  male MRN: [id number]  Unit/Bed#: [address] [place] [address] Encounter: [id number]  Primary Care Provider: Dayan Deluna MD   Date and time admitted to hospital: 3/18/2021  1:26 PM    * TIA (transient ischemic attack)  Assessment & Plan  Patient presented with intermittent right hand numbness, right leg numbness  Patient had CT scan of the brain on 03/12 which showed no acute intracranial abnormality  CT of the head and neck showed severe stenosis of the left ICA approximately 1 2 centimeter distal to the carotid bifurcation secondary to atheromatous plaque  Minimal luminal diameter of 1 millimeter corresponding to approximately 70% stenosis  2D echo with bubble study showed 55-60% without any regional wall motion abnormalities, mild concentric hypertrophy, grade 1 diastolic dysfunction without any left-to-right shunt  Patient will be continued on baby aspirin 81 milligram p o  Daily  Patient's loaded with Plavix 600 milligram in the ED  Continue Plavix 75 milligram p o  Daily  MRI of the brain showed no acute infarction  Patient's LDL was 48 and hemoglobin A1c was 7 2  Neurology consultation appreciated  TA likely related to significant carotid stenosis    Left carotid artery stenosis  Assessment & Plan  Carotid Doppler showed less than 50% stenosis on the right and 70-99% stenosis on the left with heterogeneous and irregular block  Vascular surgery input appreciated  CT of the chest showed closer to 90% stenosis on the right  Patient tentatively planned for left carotid endarterectomy on March 22, 2021 at [facility]    Hypertensive urgency  Assessment & Plan  Patient's blood pressure initially in the ED was 226/96 and persistently elevated with systolic in 939G  Continue Toprol-XL 25 milligram p o  Daily and losartan 50 milligram p o   Daily  A low permissive hypertension to keep systolic blood pressure more than 170 for the next 24 hours  Will order hydralazine p r n  For SBP more than 170    Hyperlipidemia  Assessment & Plan  Lipitor dose was increased to 80 milligram p o  Daily  LDL level is 45    Coronary artery disease  Assessment & Plan  Status post PCI x1 in 2017  Patient complaining of some exertional shortness of breath  Serial cardiac enzymes were negative  Cardiology consult appreciated  2D echo showed EF of 50 for 60% without any regional wall motion abnormalities, grade 1 diastolic dysfunction without any right left shin    Diabetes mellitus [facility])  Assessment & Plan  Lab Results   Component Value Date    HGBA1C 7 2 (H) 2021       Recent Labs     21  0650 21  1140 21  1623 21   POCGLU 129 154* 144* 174*       Blood Sugar Average: Last 72 hrs:  (P) 640 [id number]   Check hemoglobin A1c  Patient is on metformin which will be held at the current time as patient got contrast  Patient will be on Humalog sliding scale with Accu-Cheks q a c  And hs        VTE Pharmacologic Prophylaxis:   Pharmacologic: Enoxaparin (Lovenox)  Mechanical VTE Prophylaxis in Place: Yes    Patient Centered Rounds: I have performed bedside rounds with nursing staff today  Discussions with Specialists or Other Care Team Provider: Dr Ayaka Angeles    Education and Discussions with Family / Patient: Wife Alek Bridges    Time Spent for Care: 45 minutes  More than 50% of total time spent on counseling and coordination of care as described above  Current Length of Stay: 0 day(s)    Current Patient Status: Inpatient   Certification Statement: The patient will continue to require additional inpatient hospital stay due to TIA, severe carotid stenosis    Discharge Plan:  Transfer to [facility]    Code Status: Level 1 - Full Code      Subjective:   Patient has no further numbness, tingling or weakness  Denies any chest pain or shortness of breath    Objective:     Vitals:   Temp (24hrs), Av 2 °F (36 8 °C), Min:98 °F (36 7 °C), Max:98 5 °F (36 9 °C)    Temp:  [98 °F (36 7 °C)-98 5 °F (36 9 °C)] 98 4 °F (36 9 °C)  HR:  [70-85] 71  Resp:  [18-20] 18  BP: (142-162)/(77-89) 146/78  SpO2:  [95 %-96 %] 96 %  Body mass index is 25 1 kg/m²  Input and Output Summary (last 24 hours):     No intake or output data in the 24 hours ending 03/19/21 2104    Physical Exam:     Physical Exam  Constitutional:       General: He is not in acute distress  HENT:      Head: Normocephalic and atraumatic  Eyes:      Conjunctiva/sclera: Conjunctivae normal       Pupils: Pupils are equal, round, and reactive to light  Neck:      Musculoskeletal: Normal range of motion and neck supple  Cardiovascular:      Rate and Rhythm: Normal rate and regular rhythm  Heart sounds: Normal heart sounds  Pulmonary:      Effort: Pulmonary effort is normal  No respiratory distress  Breath sounds: No wheezing, rhonchi or rales  Chest:      Chest wall: No tenderness  Abdominal:      General: Bowel sounds are normal  There is no distension  Palpations: Abdomen is soft  Tenderness: There is no abdominal tenderness  There is no guarding or rebound  Skin:     General: Skin is warm and dry  Findings: No rash  Neurological:      Mental Status: He is alert  Cranial Nerves: No cranial nerve deficit  Additional Data:     Labs:    Results from last 7 days   Lab Units 03/18/21  1401   WBC Thousand/uL 8 01   HEMOGLOBIN g/dL 14 5   HEMATOCRIT % 47 3   PLATELETS Thousands/uL 205   NEUTROS PCT % 54   LYMPHS PCT % 32   MONOS PCT % 9   EOS PCT % 4     Results from last 7 days   Lab Units 03/19/21  0529 03/18/21  1401   POTASSIUM mmol/L 3 8 4 2   CHLORIDE mmol/L 105 103   CO2 mmol/L 26 28   BUN mg/dL 21 21   CREATININE mg/dL 1 25 1 31*   CALCIUM mg/dL 9 2 9 6   ALK PHOS U/L  --  64   ALT U/L  --  45   AST U/L  --  21           * I Have Reviewed All Lab Data Listed Above  * Additional Pertinent Lab Tests Reviewed:  All Labs For Current Hospital Admission Reviewed    Imaging:    Imaging Reports Reviewed Today Include:  MRI of the brain, 2D echo, carotid Doppler      Recent Cultures (last 7 days):           Last 24 Hours Medication List:   Current Facility-Administered Medications   Medication Dose Route Frequency Provider Last Rate    acetaminophen  650 mg Oral Q6H PRN Wolf Melara PA-C      aspirin  81 mg Oral Daily Farhan Sawyer MD      atorvastatin  80 mg Oral QPM MIGUELITO Camara      bimatoprost  1 drop Both Eyes HS Ken Gloria MD      cholecalciferol  1,000 Units Oral Daily Ken Gloria MD      clopidogrel  75 mg Oral Daily Ken Gloria MD      dextromethorphan-guaiFENesin  10 mL Oral Q4H PRN MIGUELITO Oh      enoxaparin  40 mg Subcutaneous Daily Ken Gloria MD      fluticasone  2 spray Each Nare Daily Ken Gloria MD      hydrALAZINE  5 mg Intravenous Q6H PRN Ken Gloria MD      insulin lispro  1-5 Units Subcutaneous HS Ken Gloria MD      insulin lispro  1-6 Units Subcutaneous TID AC Farhan Sawyer MD      loratadine  10 mg Oral Daily MIGUELITO Mccloud      [START ON 3/20/2021] losartan  100 mg Oral QAM Aby Gonsales DO      metoprolol succinate  25 mg Oral Daily Ken Gloria MD      ondansetron  4 mg Intravenous Q6H PRN Ken Gloria MD      pantoprazole  40 mg Oral Early Morning Ken Gloria MD      tobramycin-dexamethasone  0 5 inch Left Eye TID MIGUELITO Oh          Today, Patient Was Seen By: Ken Gloria MD    ** Please Note: Dictation voice to text software may have been used in the creation of this document   **

## 2021-03-20 NOTE — ASSESSMENT & PLAN NOTE
-no recurrent symptom since admisssion  -brain MRI negative  -continue DAPT and statin  -neurology consult appreciated  -see plan as outlined above

## 2021-03-20 NOTE — PLAN OF CARE
Problem: Potential for Falls  Goal: Patient will remain free of falls  Description: INTERVENTIONS:  - Assess patient frequently for physical needs  -  Identify cognitive and physical deficits and behaviors that affect risk of falls  -  Cando fall precautions as indicated by assessment   - Educate patient/family on patient safety including physical limitations  - Instruct patient to call for assistance with activity based on assessment  - Modify environment to reduce risk of injury  - Consider OT/PT consult to assist with strengthening/mobility  Outcome: Progressing     Problem: Neurological Deficit  Goal: Neurological status is stable or improving  Description: Interventions:  - Monitor and assess patient's level of consciousness, motor function, sensory function, and level of assistance needed for ADLs  - Monitor and report changes from baseline  Collaborate with interdisciplinary team to initiate plan and implement interventions as ordered  - Provide and maintain a safe environment  - Consider seizure precautions  - Consider fall precautions  - Consider aspiration precautions  - Consider bleeding precautions  Outcome: Progressing     Problem: Activity Intolerance/Impaired Mobility  Goal: Mobility/activity is maintained at optimum level for patient  Description: Interventions:  - Assess and monitor patient  barriers to mobility and need for assistive/adaptive devices  - Assess patient's emotional response to limitations  - Collaborate with interdisciplinary team and initiate plans and interventions as ordered  - Encourage independent activity per ability   - Maintain proper body alignment  - Perform active/passive rom as tolerated/ordered    - Plan activities to conserve energy   - Turn patient as appropriate  Outcome: Progressing     Problem: Communication Impairment  Goal: Ability to express needs and understand communication  Description: Assess patient's communication skills and ability to understand information  Patient will demonstrate use of effective communication techniques, alternative methods of communication and understanding even if not able to speak  - Encourage communication and provide alternate methods of communication as needed  - Collaborate with case management/ for discharge needs  - Include patient/family/caregiver in decisions related to communication  Outcome: Progressing     Problem: Potential for Aspiration  Goal: Non-ventilated patient's risk of aspiration is minimized  Description: Assess and monitor vital signs, respiratory status, and labs (WBC)  Monitor for signs of aspiration (tachypnea, cough, rales, wheezing, cyanosis, fever)  - Assess and monitor patient's ability to swallow  - Place patient up in chair to eat if possible  - HOB up at 90 degrees to eat if unable to get patient up into chair   - Supervise patient during oral intake  - Instruct patient/ family to take small bites  - Instruct patient/ family to take small single sips when taking liquids  - Follow patient-specific strategies generated by speech pathologist   Outcome: Progressing     Problem: Nutrition  Goal: Nutrition/Hydration status is improving  Description: Monitor and assess patient's nutrition/hydration status for malnutrition (ex- brittle hair, bruises, dry skin, pale skin and conjunctiva, muscle wasting, smooth red tongue, and disorientation)  Collaborate with interdisciplinary team and initiate plan and interventions as ordered  Monitor patient's weight and dietary intake as ordered or per policy  Utilize nutrition screening tool and intervene per policy  Determine patient's food preferences and provide high-protein, high-caloric foods as appropriate  - Assist patient with eating   - Allow adequate time for meals   - Encourage patient to take dietary supplement as ordered    - Collaborate with clinical nutritionist   - Include patient/family/caregiver in decisions related to nutrition    Outcome: Progressing

## 2021-03-20 NOTE — DISCHARGE SUMMARY
Chelsea 45  Discharge- Bárbara Pascal 1955, 72 y o  male MRN: [id number]  Unit/Bed#: [address] [place] [address] Encounter: [id number]  Primary Care Provider: Jennifer Varghese MD   Date and time admitted to hospital: 3/18/2021  1:26 PM    * TIA (transient ischemic attack)  Assessment & Plan  Patient presented with intermittent right hand numbness, right leg numbness  Patient had CT scan of the brain on 03/12 which showed no acute intracranial abnormality  CT of the head and neck showed severe stenosis of the left ICA approximately 1 2 centimeter distal to the carotid bifurcation secondary to atheromatous plaque  Minimal luminal diameter of 1 millimeter corresponding to approximately 70% stenosis  2D echo with bubble study showed 55-60% without any regional wall motion abnormalities, mild concentric hypertrophy, grade 1 diastolic dysfunction without any left-to-right shunt  Patient will be continued on baby aspirin 81 milligram p o  Daily  Patient's loaded with Plavix 600 milligram in the ED  Continue Plavix 75 milligram p o   Daily  MRI of the brain showed no acute infarction  Patient's LDL was 48 and hemoglobin A1c was 7 2  Neurology consultation appreciated  TA likely related to significant carotid stenosis    Left carotid artery stenosis  Assessment & Plan  Carotid Doppler showed less than 50% stenosis on the right and 70-99% stenosis on the left with heterogeneous and irregular block  Vascular surgery input appreciated  CT of the head and neck showed severe stenosis of the left ICA approximately 1 2 centimeter distal to the carotid bifurcation secondary to atheromatous block-70% stenosis as per report but closer to 90% as per vascular surgery  Patient is planned for left carotid endarterectomy on Monday March 22, 2021  Patient to be transferred to [facility]  Discussed with [facility]    Hypertensive urgency  Assessment & Plan  Patient's blood pressure initially in the ED was 226/96 and persistently elevated with systolic in 098M  Continue Toprol-XL 25 milligram p o  Daily and losartan 50 milligram p o  Daily  A low permissive hypertension to keep systolic blood pressure more than 170 given his severe carotid stenosis  Will order hydralazine p r n  For SBP more than 170    Hyperlipidemia  Assessment & Plan  Lipitor dose was increased to 80 milligram p o  Daily  LDL level is 45    Coronary artery disease  Assessment & Plan  Status post PCI x1 in 2017  Patient complaining of some exertional shortness of breath  Serial cardiac enzymes were negative  Cardiology consult appreciated  2D echo showed EF of 50 for 60% without any regional wall motion abnormalities, grade 1 diastolic dysfunction without any right left shin    Diabetes mellitus [facility])  Assessment & Plan  Lab Results   Component Value Date    HGBA1C 7 2 (H) 03/19/2021       Recent Labs     03/19/21  2033 03/20/21  0730 03/20/21  1130 03/20/21  1634   POCGLU 174* 134 282* 124       Blood Sugar Average: Last 72 hrs:  (P) 915 [id number]     Metformin has been on hold at the current time as patient got contrast  Patient will be on Humalog sliding scale with Accu-Cheks q a c   And hs      Discharging Physician / Practitioner: Efraín Francois MD  PCP: Nicole Rinaldi MD  Admission Date:   Admission Orders (From admission, onward)     Ordered        03/19/21 1554  Inpatient Admission  Once         03/18/21 1608  Place in Observation  Once                   Discharge Date: 03/20/21    Resolved Problems  Date Reviewed: 3/20/2021    None          Consultations During Hospital Stay:  · Neurology, Cardiology, vascular surgery    Procedures Performed:   · Carotid Doppler showed less than 50% stenosis on the right and 70-99% stenosis on the left  · 2D echo showed EF of 50 for 60% without any regional wall motion abnormalities, mild concentric hypertrophy, grade 1 diastolic dysfunction      Complications:  None    Reason for Admission:  Right hand and arm numbness    Hospital Course:     Dionte Ovalle is a 72 y o  male patient with past medical history of CAD, hypertension, hyperlipidemia, GERD, sleep apnea, diabetes, BPH  who originally presented to the hospital on 3/18/2021 due to intermittent right hand and arm numbness, right leg numbness with some weakness  CT scan of the brain was unremarkable  CT of the head and neck showed severe stenosis of the left internal carotid artery  Patient was seen in consult with Neurology and vascular surgery along with Cardiology  Patient was loaded with Plavix and was continued on Plavix  Patient later had MRI of the brain which was negative  Patient continued remained stable without any further neurological symptoms  Carotid Doppler showed close to 90% stenosis on the left  Discussed with vascular surgery who recommended the patient to be transferred to [facility] for carotid endarterectomy on March 22nd  Discussed in detail with patient and wife  Please see above list of diagnoses and related plan for additional information  Condition at Discharge: stable     Discharge Day Visit / Exam:     Subjective:  Patient is feeling well  Denies any tingling or numbness or weakness  Vitals: Blood Pressure: 139/77 (03/20/21 1634)  Pulse: 73 (03/20/21 1634)  Temperature: 98 3 °F (36 8 °C) (03/20/21 1634)  Temp Source: Oral (03/19/21 2252)  Respirations: 21 (03/20/21 1634)  Height: 5' 9" (175 3 cm) (03/18/21 1747)  Weight - Scale: 77 1 kg (170 lb) (03/18/21 1747)  SpO2: 95 % (03/20/21 1634)  Exam:   Physical Exam  Constitutional:       Appearance: Normal appearance  HENT:      Head: Normocephalic and atraumatic  Eyes:      Extraocular Movements: Extraocular movements intact  Pupils: Pupils are equal, round, and reactive to light  Neck:      Musculoskeletal: Normal range of motion and neck supple  Cardiovascular:      Rate and Rhythm: Normal rate and regular rhythm  Heart sounds:  No murmur  No gallop  Pulmonary:      Effort: Pulmonary effort is normal       Breath sounds: Normal breath sounds  Abdominal:      General: Bowel sounds are normal       Palpations: Abdomen is soft  Tenderness: There is no abdominal tenderness  Musculoskeletal: Normal range of motion  General: No swelling or deformity  Skin:     General: Skin is warm and dry  Neurological:      General: No focal deficit present  Mental Status: He is alert  Discussion with Family: Wife Karan Anderson    Discharge instructions/Information to patient and family:   See after visit summary for information provided to patient and family  Provisions for Follow-Up Care:  See after visit summary for information related to follow-up care and any pertinent home health orders  Disposition:     [address] [facility]  [address] Transfer to [address] [place] [address]    Planned Readmission: No     Discharge Statement:  I spent 35 minutes discharging the patient  This time was spent on the day of discharge  I had direct contact with the patient on the day of discharge  Greater than 50% of the total time was spent examining patient, answering all patient questions, arranging and discussing plan of care with patient as well as directly providing post-discharge instructions  Additional time then spent on discharge activities  Discharge Medications:  See after visit summary for reconciled discharge medications provided to patient and family        ** Please Note: This note has been constructed using a voice recognition system **

## 2021-03-20 NOTE — ASSESSMENT & PLAN NOTE
Lab Results   Component Value Date    HGBA1C 7 2 (H) 03/19/2021       Recent Labs     03/19/21  2033 03/20/21  0730 03/20/21  1130 03/20/21  1634   POCGLU 174* 134 282* 124       Blood Sugar Average: Last 72 hrs:  (P) 404 [id number]     Metformin has been on hold at the current time as patient got contrast  Patient will be on Humalog sliding scale with Accu-Cheks q a c   And hs

## 2021-03-20 NOTE — ASSESSMENT & PLAN NOTE
Patient presented with intermittent right hand numbness, right leg numbness  Loaded with plavix prior to transfer  Patient's LDL was 48 and hemoglobin A1c was 7 2  TA likely related to significant carotid stenosis  CT brain: No acute intracranial abnormality  MRI brain: No acute infarction  CT H/N: Severe stenosis of the left ICA approximately 1 2 centimeter distal to the carotid bifurcation secondary to atheromatous plaque  Minimal luminal diameter of 1 millimeter corresponding to approximately 70% stenosis  Echo with bubble study: EF 55-60% without any regional wall motion abnormalities, mild concentric hypertrophy, grade 1 diastolic dysfunction without any left-to-right shunt  VAS Doppler: RIGHT: There is <50% stenosis noted in the internal carotid artery  Plaque is heterogenous and irregular  LEFT: There is 70-99% stenosis noted in the internal carotid artery  Plaque is heterogenous and irregular  Plan:  · Aspirin 81 mg p o  Daily  · Continue Plavix 75 mg p o  Daily  · Underwent Neurology evaluation at [facility]  Would benefit from f/u with outpatient Neuro   · Vascular consulted  See L carotid artery stenosis plan

## 2021-03-20 NOTE — ASSESSMENT & PLAN NOTE
Patient presented with intermittent right hand numbness, right leg numbness  Patient had CT scan of the brain on 03/12 which showed no acute intracranial abnormality  CT of the head and neck showed severe stenosis of the left ICA approximately 1 2 centimeter distal to the carotid bifurcation secondary to atheromatous plaque  Minimal luminal diameter of 1 millimeter corresponding to approximately 70% stenosis  2D echo with bubble study showed 55-60% without any regional wall motion abnormalities, mild concentric hypertrophy, grade 1 diastolic dysfunction without any left-to-right shunt  Patient will be continued on baby aspirin 81 milligram p o  Daily  Patient's loaded with Plavix 600 milligram in the ED  Continue Plavix 75 milligram p o   Daily  MRI of the brain showed no acute infarction  Patient's LDL was 48 and hemoglobin A1c was 7 2  Neurology consultation appreciated  TA likely related to significant carotid stenosis

## 2021-03-20 NOTE — ASSESSMENT & PLAN NOTE
Patient's blood pressure initially in the ED was 226/96 and persistently elevated with systolic in 716U  Continue Toprol-XL 25 milligram p o  Daily and losartan 50 milligram p o  Daily  A low permissive hypertension to keep systolic blood pressure more than 170 for the next 24 hours  Will order hydralazine p r n   For SBP more than 170

## 2021-03-20 NOTE — EMTALA/ACUTE CARE TRANSFER
[address] [facility] [address]  Dept: [phone]      ACUTE CARE TRANSFER CONSENT    NAME Cindy Jimenez                                         1955                              MRN [id number]    I have been informed of my rights regarding examination, treatment, and transfer   by Dr Heide Mccord MD    Benefits:   higher level of care    Risks:   none      Transfer Request:  I acknowledge that my medical condition has been evaluated and explained to me by the treating physician or other qualified medical person and/or my attending physician who has recommended and offered to me further medical examination and treatment  I understand the Hospital's obligation with respect to the treatment and stabilization of my medical condition  I nevertheless request to be transferred  I release the Hospital, the doctor, and any other persons caring for me from all responsibility or liability for any injury or ill effects that may result from my transfer and agree to accept all responsibility for the consequences of my choice to transfer, rather than receive stabilizing treatment at the Hospital  I understand that because the transfer is my request, my insurance may not provide reimbursement for the services  The Hospital will assist and direct me and my family in how to make arrangements for transfer, but the hospital is not liable for any fees charged by the transport service  In spite of this understanding, I refuse to consent to further medical examination and treatment which has been offered to me, and request transfer to    I authorize the performance of emergency medical procedures and treatments upon me in both transit and upon arrival at the receiving facility  Additionally, I authorize the release of any and all medical records to the receiving facility and request they be transported with me, if possible      I authorize the performance of emergency medical procedures and treatments upon me in both transit and upon arrival at the receiving facility  Additionally, I authorize the release of any and all medical records to the receiving facility and request they be transported with me, if possible  I understand that the safest mode of transportation during a medical emergency is an ambulance and that the Hospital advocates the use of this mode of transport  Risks of traveling to the receiving facility by car, including absence of medical control, life sustaining equipment, such as oxygen, and medical personnel has been explained to me and I fully understand them  (PAULO CORRECT BOX BELOW)  [  ]  I consent to the stated transfer and to be transported by ambulance/helicopter  [  ]  I consent to the stated transfer, but refuse transportation by ambulance and accept full responsibility for my transportation by car  I understand the risks of non-ambulance transfers and I exonerate the Hospital and its staff from any deterioration in my condition that results from this refusal     X___________________________________________    DATE  21  TIME________  Signature of patient or legally responsible individual signing on patient behalf           RELATIONSHIP TO PATIENT_________________________          Provider Certification    NAME Dionte Ovalle                                        [facility] 1955                              MRN [id number]    A medical screening exam was performed on the above named patient    Based on the examination:    Condition Necessitating Transfer severe left carotid stenosis    Patient Condition:   stable    Reason for Transfer:   for carotid endarterectomy    Transfer Requirements: Facility     · Space available and qualified personnel available for treatment as acknowledged by    · Agreed to accept transfer and to provide appropriate medical treatment as acknowledged by          · Appropriate medical records of the examination and treatment of the patient are provided at the time of transfer   [address] [facility], [address] _______  · Transfer will be performed by qualified personnel from    and appropriate transfer equipment as required, including the use of necessary and appropriate life support measures  Provider Certification: I have examined the patient and explained the following risks and benefits of being transferred/refusing transfer to the patient/family:         Based on these reasonable risks and benefits to the patient and/or the unborn child(geovani), and based upon the information available at the time of the patients examination, I certify that the medical benefits reasonably to be expected from the provision of appropriate medical treatments at another medical facility outweigh the increasing risks, if any, to the individuals medical condition, and in the case of labor to the unborn child, from effecting the transfer      X____________________________________________ DATE 03/20/21        TIME_______      ORIGINAL - SEND TO MEDICAL RECORDS   COPY - SEND WITH PATIENT DURING TRANSFER

## 2021-03-20 NOTE — ASSESSMENT & PLAN NOTE
Lab Results   Component Value Date    HGBA1C 7 2 (H) 03/19/2021       Recent Labs     03/19/21  1140 03/19/21  1623 03/19/21 2033 03/20/21  0730   POCGLU 154* 144* 174* 134       Blood Sugar Average: Last 72 hrs:  (P) 139 [id number]   --stable  -continue to optimize BS control for prevention of vascular disease and SSI  -management per primary medical service

## 2021-03-20 NOTE — ASSESSMENT & PLAN NOTE
Carotid Doppler showed less than 50% stenosis on the right and 70-99% stenosis on the left with heterogeneous and irregular block  Vascular surgery input appreciated  CT of the head and neck showed severe stenosis of the left ICA approximately 1 2 centimeter distal to the carotid bifurcation secondary to atheromatous block-70% stenosis as per report but closer to 90% as per vascular surgery  Patient is planned for left carotid endarterectomy on Monday March 22, 2021  Patient to be transferred to [facility]  Discussed with [facility] Service

## 2021-03-20 NOTE — ASSESSMENT & PLAN NOTE
-hx PCI/ED LCx [facility] 7/6/17  -stable without angina  -TTE with preserved LV function, EF 55-60%  No RWMA  Mild MR, TR  -cardiology consult appreciated    Cleared to proceed with carotid endarterectomy with no further cardiac testing  -continue beta-blocker, statin and ASA

## 2021-03-20 NOTE — ASSESSMENT & PLAN NOTE
Status post PCI x1 in 2017  Patient complaining of some exertional shortness of breath  Serial cardiac enzymes were negative  Cardiology consult appreciated  2D echo showed EF of 50 for 60% without any regional wall motion abnormalities, grade 1 diastolic dysfunction without any right left shin

## 2021-03-20 NOTE — UTILIZATION REVIEW
Notification of Inpatient Admission/Inpatient Authorization Request   This is a Notification of Inpatient Admission for [facility] 45  Be advised that this patient was admitted to our facility under Inpatient Status  Contact Rafia Riggs at [phone] for additional admission information  John Rivera  DEPT  DEDICATED -[phone]  Patient Name:   Chris Hector   YOB: 1955       [address]   [address]:   [facility]  Tax ID: [id number]  NPI: [id number] Attending Provider/NPI:  Phone:  Address: Kavitha Wilson [[id number]]  [phone]  Same as DARNELL/Frannie Keane 1106 of Service Code: 24 Place of Service Name:  [address] [place] [address]   Start Date: 3/19/21 1554 Discharge Date & Time: No discharge date for patient encounter  Type of Admission: Inpatient Status Discharge Disposition (if discharged): Home/Self Care   Patient Diagnoses: TIA (transient ischemic attack) [G45 9]  Numbness [R20 0]     Orders: Admission Orders (From admission, onward)     Ordered        03/19/21 1554  Inpatient Admission  Once         03/18/21 1608  Place in Observation  Once                    Assigned Utilization Review Contact: Nasir Riggs  Utilization   Network Utilization Review Department  Phone: [phone]; Fax [phone]  Email: [email or web address]   ATTENTION PAYERS: Please call the assigned Utilization  directly with any questions or concerns ALL voicemails in the department are confidential  Send all requests for admission clinical reviews, approved or denied determinations and any other requests to dedicated fax number belonging to the campus where the patient is receiving treatment
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
## 2021-03-20 NOTE — ASSESSMENT & PLAN NOTE
22-year-old male former smoker w/HTN, HLD, DM II, GERD, MANDI w/CPAP, CAD, s/p PCI/ED LCx '17 and Factor V Leiden deficiency with provoked LLE DVT '13 (no chronic AC) presents w/3 transient episodes of R sided numbness and associated symptoms over past week consistent w/TIA 2/2 symptomatic high grade L carotid stenosis  Diagnostics:  -CTA head/neck 3/18/2021:  90% proximal L ICA stenosis w/no significant R ICA stenosis  Mild bilateral vertebral stenosis at origin  No significant intracranial disease  Two 4 mm nodules in the posterior body of the right ventricle   -Carotid duplex:  70-99% L ICA stenosis, velocity 614/242, ratio 5 54   <50% R ICA stenosis, velocity 63/9, ratio 0 62  -noncontrast CT head 3/12/2021:  No intracranial abnormality or infarct  -brain MRI 3/19/2021:  No acute or chronic infarct or intracranial abnormality  -TTE 3/18/2021:  Preserved LV fx, EF 55-60%  No RWMA  No PFO or right-->left shunt  Mild MR, TR   -LEV 3/10/2021:  No evidence of acute or chronic LLE DVT or superficial thrombophlebitis  Nonvascular subcutaneous nodule mid left calf  -COVID 3/19/21:  negative    Recommendations:  -L TIA 2/2 symptomatic high-grade L ICA stenosis  -plan for L CEA by Dr Wilbert Solorzano at [facility] on Monday, 3/22/21  Discussed at length with patient this a m  And with wife over the phone last p m     All questions answered  Patient and wife express understanding and wish to proceed    Will require transfer to medical service @ [facility] over weekend in anticipation of OR Monday  -will need T&S and preop orders on arrival to [facility]  -cardiology consult appreciated and cleared to proceed with CEA with no need for further cardiac testing  -continue DAPT, statin  -BP well controlled  -d/w Dr Latanya Tejada  -will discuss with Dr Wiblert Solorzano

## 2021-03-20 NOTE — ASSESSMENT & PLAN NOTE
-stable  -permissive hypertension in the setting of TIA  -continue current medical regimen  -management per primary medical service

## 2021-03-20 NOTE — ASSESSMENT & PLAN NOTE
Lab Results   Component Value Date    HGBA1C 7 2 (H) 03/19/2021       Recent Labs     03/19/21 2033 03/20/21  0730 03/20/21  1130 03/20/21  1634   POCGLU 174* 134 282* 124       Blood Sugar Average: Last 72 hrs:     · Metformin has been on hold  · Humalog sliding scale with Accu-Cheks q a c   And hs  · Resume metformin as OP

## 2021-03-20 NOTE — PLAN OF CARE
Problem: Potential for Falls  Goal: Patient will remain free of falls  Description: INTERVENTIONS:  - Assess patient frequently for physical needs  -  Identify cognitive and physical deficits and behaviors that affect risk of falls  -  [place] fall precautions as indicated by assessment   - Educate patient/family on patient safety including physical limitations  - Instruct patient to call for assistance with activity based on assessment  - Modify environment to reduce risk of injury  - Consider OT/PT consult to assist with strengthening/mobility  Outcome: Progressing     Problem: Neurological Deficit  Goal: Neurological status is stable or improving  Description: Interventions:  - Monitor and assess patient's level of consciousness, motor function, sensory function, and level of assistance needed for ADLs  - Monitor and report changes from baseline  Collaborate with interdisciplinary team to initiate plan and implement interventions as ordered  - Provide and maintain a safe environment  - Consider seizure precautions  - Consider fall precautions  - Consider aspiration precautions  - Consider bleeding precautions  Outcome: Progressing     Problem: Activity Intolerance/Impaired Mobility  Goal: Mobility/activity is maintained at optimum level for patient  Description: Interventions:  - Assess and monitor patient  barriers to mobility and need for assistive/adaptive devices  - Assess patient's emotional response to limitations  - Collaborate with interdisciplinary team and initiate plans and interventions as ordered  - Encourage independent activity per ability   - Maintain proper body alignment  - Perform active/passive rom as tolerated/ordered    - Plan activities to conserve energy   - Turn patient as appropriate  Outcome: Progressing     Problem: Communication Impairment  Goal: Ability to express needs and understand communication  Description: Assess patient's communication skills and ability to understand information  Patient will demonstrate use of effective communication techniques, alternative methods of communication and understanding even if not able to speak  - Encourage communication and provide alternate methods of communication as needed  - Collaborate with case management/ for discharge needs  - Include patient/family/caregiver in decisions related to communication  Outcome: Progressing     Problem: Potential for Aspiration  Goal: Non-ventilated patient's risk of aspiration is minimized  Description: Assess and monitor vital signs, respiratory status, and labs (WBC)  Monitor for signs of aspiration (tachypnea, cough, rales, wheezing, cyanosis, fever)  - Assess and monitor patient's ability to swallow  - Place patient up in chair to eat if possible  - HOB up at 90 degrees to eat if unable to get patient up into chair   - Supervise patient during oral intake  - Instruct patient/ family to take small bites  - Instruct patient/ family to take small single sips when taking liquids  - Follow patient-specific strategies generated by speech pathologist   Outcome: Progressing     Problem: Nutrition  Goal: Nutrition/Hydration status is improving  Description: Monitor and assess patient's nutrition/hydration status for malnutrition (ex- brittle hair, bruises, dry skin, pale skin and conjunctiva, muscle wasting, smooth red tongue, and disorientation)  Collaborate with interdisciplinary team and initiate plan and interventions as ordered  Monitor patient's weight and dietary intake as ordered or per policy  Utilize nutrition screening tool and intervene per policy  Determine patient's food preferences and provide high-protein, high-caloric foods as appropriate  - Assist patient with eating   - Allow adequate time for meals   - Encourage patient to take dietary supplement as ordered    - Collaborate with clinical nutritionist   - Include patient/family/caregiver in decisions related to nutrition    Outcome: Progressing

## 2021-03-20 NOTE — ASSESSMENT & PLAN NOTE
Lab Results   Component Value Date    HGBA1C 7 2 (H) 03/19/2021       Recent Labs     03/19/21  0650 03/19/21  1140 03/19/21  1623 03/19/21 2033   POCGLU 129 154* 144* 174*       Blood Sugar Average: Last 72 hrs:  (P) 658 [id number]   Check hemoglobin A1c  Patient is on metformin which will be held at the current time as patient got contrast  Patient will be on Humalog sliding scale with Accu-Cheks q a c   And hs

## 2021-03-20 NOTE — ASSESSMENT & PLAN NOTE
Status post PCI x1 in 2017  Patient complaining of some exertional shortness of breath on presentation  Serial cardiac enzymes were negative  Echo showed EF of 50 for 60% without any regional wall motion abnormalities, grade 1 diastolic dysfunction without any right left shin       Plan:  · Continue metoprolol 25 mg daily

## 2021-03-20 NOTE — ASSESSMENT & PLAN NOTE
CT H/N: Severe stenosis of the left ICA approximately 1 2 centimeter distal to the carotid bifurcation secondary to atheromatous block-70% stenosis as per report  VAS Doppler: RIGHT: There is <50% stenosis noted in the internal carotid artery  Plaque is heterogenous and irregular  LEFT: There is 70-99% stenosis noted in the internal carotid artery  Plaque is heterogenous and irregular  Vascular suspects left ICA stenosis closer to 90%  Plan:  · Vascular surgery consulted    · S/p Left carotid endarterectomy 3/22/21  · Continue plavix 75 mg daily, ASA 81 mg daily, and atorvastatin 80 mg QD  · Weaned off cardene gtt today, increase losartan to 100 mg QD, increase amlodipine to 10 mg QD, add chlorthalidone 12 5 mg QD

## 2021-03-20 NOTE — ASSESSMENT & PLAN NOTE
Patient's blood pressure initially in the ED was 226/96 and persistently elevated with systolic in 701S  Continue Toprol-XL 25 milligram p o  Daily and losartan 50 milligram p o  Daily  A low permissive hypertension to keep systolic blood pressure more than 170 given his severe carotid stenosis  Will order hydralazine p r n   For SBP more than 170

## 2021-03-20 NOTE — ASSESSMENT & PLAN NOTE
Carotid Doppler showed less than 50% stenosis on the right and 70-99% stenosis on the left with heterogeneous and irregular block  Vascular surgery input appreciated  CT of the chest showed closer to 90% stenosis on the right  Patient tentatively planned for left carotid endarterectomy on March 22, 2021 at [facility]

## 2021-03-20 NOTE — PROGRESS NOTES
[facility] 45  Progress Note - Madelene Cranker 1955, 72 y o  male MRN: [id number]  Unit/Bed#: [address] [place] [address] Encounter: [id number]  Primary Care Provider: Elmo Aragon MD   Date and time admitted to hospital: 3/18/2021  1:26 PM    Left carotid artery stenosis  Assessment & Plan  70-year-old male former smoker w/HTN, HLD, DM II, GERD, MANDI w/CPAP, CAD, s/p PCI/ED LCx '17 and Factor V Leiden deficiency with provoked LLE DVT '13 (no chronic AC) presents w/3 transient episodes of R sided numbness and associated symptoms over past week consistent w/TIA 2/2 symptomatic high grade L carotid stenosis  Diagnostics:  -CTA head/neck 3/18/2021:  90% proximal L ICA stenosis w/no significant R ICA stenosis  Mild bilateral vertebral stenosis at origin  No significant intracranial disease  Two 4 mm nodules in the posterior body of the right ventricle   -Carotid duplex:  70-99% L ICA stenosis, velocity 614/242, ratio 5 54   <50% R ICA stenosis, velocity 63/9, ratio 0 62  -noncontrast CT head 3/12/2021:  No intracranial abnormality or infarct  -brain MRI 3/19/2021:  No acute or chronic infarct or intracranial abnormality  -TTE 3/18/2021:  Preserved LV fx, EF 55-60%  No RWMA  No PFO or right-->left shunt  Mild MR, TR   -LEV 3/10/2021:  No evidence of acute or chronic LLE DVT or superficial thrombophlebitis  Nonvascular subcutaneous nodule mid left calf  -COVID 3/19/21:  negative    Recommendations:  -L TIA 2/2 symptomatic high-grade L ICA stenosis  -plan for L CEA by Dr Jak Art at [facility] on Monday, 3/22/21  Discussed at length with patient this a m  And with wife over the phone last p m     All questions answered  Patient and wife express understanding and wish to proceed    Will require transfer to medical service @ [facility] over weekend in anticipation of OR Monday  -will need T&S and preop orders on arrival to [facility]  -cardiology consult appreciated and cleared to proceed with CEA with no need for further cardiac testing  -continue DAPT, statin  -BP well controlled  -d/w Dr Narendra Turpin  -will discuss with Dr Tammy Whitney         * TIA (transient ischemic attack)  Assessment & Plan  -no recurrent symptom since admisssion  -brain MRI negative  -continue DAPT and statin  -neurology consult appreciated  -see plan as outlined above    Hypertensive urgency  Assessment & Plan  -stable  -permissive hypertension in the setting of TIA  -continue current medical regimen  -management per primary medical service    Coronary artery disease  Assessment & Plan  -hx PCI/ED LCx [facility] 7/6/17  -stable without angina  -TTE with preserved LV function, EF 55-60%  No RWMA  Mild MR, TR  -cardiology consult appreciated  Cleared to proceed with carotid endarterectomy with no further cardiac testing  -continue beta-blocker, statin and ASA    Diabetes mellitus [facility])  Assessment & Plan  Lab Results   Component Value Date    HGBA1C 7 2 (H) 03/19/2021       Recent Labs     03/19/21  1140 03/19/21  1623 03/19/21  2033 03/20/21  0730   POCGLU 154* 144* 174* 134       Blood Sugar Average: Last 72 hrs:  (P) 139 [id number]   --stable  -continue to optimize BS control for prevention of vascular disease and SSI  -management per primary medical service        Subjective:  Patient awake, alert, neuro intact  no new neurologic deficits  No recurrent symptoms  Cardiology consult appreciated  VSS    Vitals:  /75   Pulse 71   Temp 98 5 °F (36 9 °C)   Resp 18   Ht 5' 9" (1 753 m)   Wt 77 1 kg (170 lb)   SpO2 96%   BMI 25 10 kg/m²     I/Os:  No intake/output data recorded  No intake/output data recorded      Lab Results and Cultures:   Lab Results   Component Value Date    WBC 8 01 03/18/2021    HGB 14 5 03/18/2021    HCT 47 3 03/18/2021    MCV 93 03/18/2021     03/18/2021     Lab Results   Component Value Date    CALCIUM 9 2 03/19/2021    K 3 8 03/19/2021    CO2 26 03/19/2021     03/19/2021    BUN 21 03/19/2021    CREATININE 1 25 03/19/2021     Lab Results   Component Value Date    INR 0 91 03/12/2021    INR 1 00 05/08/2019    INR 1 0 08/06/2018    PROTIME 12 2 03/12/2021    PROTIME 10 5 05/08/2019    PROTIME 10 5 08/06/2018        Blood Culture: No results found for: BLOODCX,   Urinalysis: No results found for: COLORU, CLARITYU, SPECGRAV, PHUR, LEUKOCYTESUR, NITRITE, PROTEINUA, GLUCOSEU, KETONESU, BILIRUBINUR, BLOODU,   Urine Culture: No results found for: URINECX,   Wound Culure: No results found for: WOUNDCULT    Medications:  Current Facility-Administered Medications   Medication Dose Route Frequency    acetaminophen (TYLENOL) tablet 650 mg  650 mg Oral Q6H PRN    aspirin chewable tablet 81 mg  81 mg Oral Daily    atorvastatin (LIPITOR) tablet 80 mg  80 mg Oral QPM    bimatoprost (LUMIGAN) 0 01 % ophthalmic solution 1 drop  1 drop Both Eyes HS    cholecalciferol (VITAMIN D3) tablet 1,000 Units  1,000 Units Oral Daily    clopidogrel (PLAVIX) tablet 75 mg  75 mg Oral Daily    dextromethorphan-guaiFENesin (ROBITUSSIN DM) oral syrup 10 mL  10 mL Oral Q4H PRN    enoxaparin (LOVENOX) subcutaneous injection 40 mg  40 mg Subcutaneous Daily    fluticasone (FLONASE) 50 mcg/act nasal spray 2 spray  2 spray Each Nare Daily    hydrALAZINE (APRESOLINE) injection 5 mg  5 mg Intravenous Q6H PRN    insulin lispro (HumaLOG) 100 units/mL subcutaneous injection 1-5 Units  1-5 Units Subcutaneous HS    insulin lispro (HumaLOG) 100 units/mL subcutaneous injection 1-6 Units  1-6 Units Subcutaneous TID AC    loratadine (CLARITIN) tablet 10 mg  10 mg Oral Daily    losartan (COZAAR) tablet 100 mg  100 mg Oral QAM    metoprolol succinate (TOPROL-XL) 24 hr tablet 25 mg  25 mg Oral Daily    ondansetron (ZOFRAN) injection 4 mg  4 mg Intravenous Q6H PRN    pantoprazole (PROTONIX) EC tablet 40 mg  40 mg Oral Early Morning    tobramycin-dexamethasone (TOBRADEX) 0 3-0 1 % ophthalmic ointment 0 5 inch  0 5 inch Left Eye 4x Daily Imaging:  Carotid duplex 3/19/2021:  Imaging study reviewed and as described above  See full report below:  Right        Impression  PSV  EDV (cm/s)  Direction of Flow  Ratio    Dist  ICA                 83          28                      0 82    Mid  ICA                 108          30                      1 06    Prox  ICA    1 - 49%      63           9                      0 62    Dist CCA                 112          18                              Mid CCA                  102          16                      1 40    Prox CCA                  73          10                              Ext Carotid              173          13                      1 70    Prox Vert                 78          19  Antegrade                   Subclavian               170           0                                 Left         Impression  PSV  EDV (cm/s)  Direction of Flow  Ratio    Dist  ICA                 72          21                      0 65    Mid  ICA                 272          68                      2 44    Prox  ICA    70 - 99%    619         242                      5 54    Dist CCA                  87          15                              Mid CCA                  112          16                      1 40    Prox CCA                  80          11                              Ext Carotid              188          21                      1 68    Prox Vert                 96          24  Antegrade                   Subclavian               180           0                                       CONCLUSION:     Impression  RIGHT:  There is <50% stenosis noted in the internal carotid artery  Plaque is  heterogenous and irregular  Vertebral artery flow is antegrade  There is no significant subclavian artery  disease  LEFT:  There is 70-99% stenosis noted in the internal carotid artery  Plaque is  heterogenous and irregular  Vertebral artery flow is antegrade   There is no significant subclavian artery  disease  Physical Exam:    General appearance: alert and oriented, in no acute distress  Neurologic: Grossly normal   Tongue midline  Smile symmetrical   Muscle strength 5/5 bilaterally in upper and lower extremities and equal bilaterally    Sensation intact to light touch and equal bilaterally  Neck: no adenopathy, no carotid bruit, no JVD, supple, symmetrical, trachea midline and thyroid not enlarged, symmetric, no tenderness/mass/nodules  Lungs: clear to auscultation bilaterally  Heart: regular rate and rhythm, S1, S2 normal, no murmur, click, rub or gallop  Abdomen: soft, non-tender; bowel sounds normal; no masses,  no organomegaly  Extremities: extremities normal, warm and well-perfused; no cyanosis, clubbing, or edema      Pulse exam:  Radial: Right: 2+ Left[de-identified] 2+    Lilli Booth PA-C  3/20/2021  [facility], [phone]

## 2021-03-20 NOTE — ASSESSMENT & PLAN NOTE
Patient's blood pressure initially in the ED was 226/96 and persistently elevated with systolic in 450H  BP controlled upon transfer to [facility]  Plan:  · Continue Toprol-XL 25 milligram p o  Daily  · Wean cardene gtt, increase losartan to 100 mg QD, increase amlodipine to 10 mg QD, add chlorthalidone 12 5 mg QD   · Will order hydralazine p r n   For SBP more than 170

## 2021-03-21 ENCOUNTER — ANESTHESIA EVENT (INPATIENT)
Dept: PERIOP | Facility: HOSPITAL | Age: 66
DRG: 038 | End: 2021-03-21
Payer: COMMERCIAL

## 2021-03-21 LAB
ABO GROUP BLD: NORMAL
ABO GROUP BLD: NORMAL
ANION GAP SERPL CALCULATED.3IONS-SCNC: 8 MMOL/L (ref 4–13)
BASOPHILS # BLD AUTO: 0.08 THOUSANDS/ΜL (ref 0–0.1)
BASOPHILS NFR BLD AUTO: 1 % (ref 0–1)
BLD GP AB SCN SERPL QL: NEGATIVE
BUN SERPL-MCNC: 23 MG/DL (ref 5–25)
CALCIUM SERPL-MCNC: 9.3 MG/DL (ref 8.3–10.1)
CHLORIDE SERPL-SCNC: 109 MMOL/L (ref 100–108)
CO2 SERPL-SCNC: 23 MMOL/L (ref 21–32)
CREAT SERPL-MCNC: 1.2 MG/DL (ref 0.6–1.3)
EOSINOPHIL # BLD AUTO: 0.36 THOUSAND/ΜL (ref 0–0.61)
EOSINOPHIL NFR BLD AUTO: 4 % (ref 0–6)
ERYTHROCYTE [DISTWIDTH] IN BLOOD BY AUTOMATED COUNT: 13.5 % (ref 11.6–15.1)
GFR SERPL CREATININE-BSD FRML MDRD: 63 ML/MIN/1.73SQ M
GLUCOSE SERPL-MCNC: 141 MG/DL (ref 65–140)
GLUCOSE SERPL-MCNC: 142 MG/DL (ref 65–140)
GLUCOSE SERPL-MCNC: 189 MG/DL (ref 65–140)
GLUCOSE SERPL-MCNC: 190 MG/DL (ref 65–140)
GLUCOSE SERPL-MCNC: 240 MG/DL (ref 65–140)
HCT VFR BLD AUTO: 46.4 % (ref 36.5–49.3)
HGB BLD-MCNC: 14.6 G/DL (ref 12–17)
IMM GRANULOCYTES # BLD AUTO: 0.02 THOUSAND/UL (ref 0–0.2)
IMM GRANULOCYTES NFR BLD AUTO: 0 % (ref 0–2)
LYMPHOCYTES # BLD AUTO: 2.93 THOUSANDS/ΜL (ref 0.6–4.47)
LYMPHOCYTES NFR BLD AUTO: 35 % (ref 14–44)
MCH RBC QN AUTO: 28.6 PG (ref 26.8–34.3)
MCHC RBC AUTO-ENTMCNC: 31.5 G/DL (ref 31.4–37.4)
MCV RBC AUTO: 91 FL (ref 82–98)
MONOCYTES # BLD AUTO: 0.79 THOUSAND/ΜL (ref 0.17–1.22)
MONOCYTES NFR BLD AUTO: 9 % (ref 4–12)
NEUTROPHILS # BLD AUTO: 4.28 THOUSANDS/ΜL (ref 1.85–7.62)
NEUTS SEG NFR BLD AUTO: 51 % (ref 43–75)
NRBC BLD AUTO-RTO: 0 /100 WBCS
PLATELET # BLD AUTO: 183 THOUSANDS/UL (ref 149–390)
PMV BLD AUTO: 12.8 FL (ref 8.9–12.7)
POTASSIUM SERPL-SCNC: 3.9 MMOL/L (ref 3.5–5.3)
RBC # BLD AUTO: 5.11 MILLION/UL (ref 3.88–5.62)
RH BLD: POSITIVE
RH BLD: POSITIVE
SODIUM SERPL-SCNC: 140 MMOL/L (ref 136–145)
SPECIMEN EXPIRATION DATE: NORMAL
WBC # BLD AUTO: 8.46 THOUSAND/UL (ref 4.31–10.16)

## 2021-03-21 PROCEDURE — 82948 REAGENT STRIP/BLOOD GLUCOSE: CPT

## 2021-03-21 PROCEDURE — 99024 POSTOP FOLLOW-UP VISIT: CPT | Performed by: SURGERY

## 2021-03-21 PROCEDURE — 86850 RBC ANTIBODY SCREEN: CPT | Performed by: STUDENT IN AN ORGANIZED HEALTH CARE EDUCATION/TRAINING PROGRAM

## 2021-03-21 PROCEDURE — 86901 BLOOD TYPING SEROLOGIC RH(D): CPT | Performed by: STUDENT IN AN ORGANIZED HEALTH CARE EDUCATION/TRAINING PROGRAM

## 2021-03-21 PROCEDURE — 85025 COMPLETE CBC W/AUTO DIFF WBC: CPT | Performed by: STUDENT IN AN ORGANIZED HEALTH CARE EDUCATION/TRAINING PROGRAM

## 2021-03-21 PROCEDURE — 86900 BLOOD TYPING SEROLOGIC ABO: CPT | Performed by: STUDENT IN AN ORGANIZED HEALTH CARE EDUCATION/TRAINING PROGRAM

## 2021-03-21 PROCEDURE — 80048 BASIC METABOLIC PNL TOTAL CA: CPT | Performed by: STUDENT IN AN ORGANIZED HEALTH CARE EDUCATION/TRAINING PROGRAM

## 2021-03-21 PROCEDURE — 99223 1ST HOSP IP/OBS HIGH 75: CPT | Performed by: INTERNAL MEDICINE

## 2021-03-21 PROCEDURE — NC001 PR NO CHARGE: Performed by: INTERNAL MEDICINE

## 2021-03-21 RX ORDER — CHLORHEXIDINE GLUCONATE 0.12 MG/ML
15 RINSE ORAL ONCE
Status: COMPLETED | OUTPATIENT
Start: 2021-03-22 | End: 2021-03-22

## 2021-03-21 RX ORDER — SODIUM CHLORIDE 9 MG/ML
75 INJECTION, SOLUTION INTRAVENOUS CONTINUOUS
Status: DISCONTINUED | OUTPATIENT
Start: 2021-03-22 | End: 2021-03-23

## 2021-03-21 RX ADMIN — TOBRAMYCIN AND DEXAMETHASONE 1 DROP: 3; 1 SUSPENSION/ DROPS OPHTHALMIC at 11:28

## 2021-03-21 RX ADMIN — ENOXAPARIN SODIUM 40 MG: 40 INJECTION SUBCUTANEOUS at 08:42

## 2021-03-21 RX ADMIN — LOSARTAN POTASSIUM 100 MG: 50 TABLET, FILM COATED ORAL at 08:40

## 2021-03-21 RX ADMIN — INSULIN LISPRO 3 UNITS: 100 INJECTION, SOLUTION INTRAVENOUS; SUBCUTANEOUS at 11:25

## 2021-03-21 RX ADMIN — INSULIN LISPRO 1 UNITS: 100 INJECTION, SOLUTION INTRAVENOUS; SUBCUTANEOUS at 22:33

## 2021-03-21 RX ADMIN — TOBRAMYCIN AND DEXAMETHASONE 1 DROP: 3; 1 SUSPENSION/ DROPS OPHTHALMIC at 06:02

## 2021-03-21 RX ADMIN — LORATADINE 10 MG: 10 TABLET ORAL at 08:40

## 2021-03-21 RX ADMIN — ATORVASTATIN CALCIUM 80 MG: 80 TABLET, FILM COATED ORAL at 17:25

## 2021-03-21 RX ADMIN — ASPIRIN 81 MG: 81 TABLET, CHEWABLE ORAL at 08:40

## 2021-03-21 RX ADMIN — Medication 1000 UNITS: at 08:40

## 2021-03-21 RX ADMIN — TOBRAMYCIN AND DEXAMETHASONE 1 DROP: 3; 1 SUSPENSION/ DROPS OPHTHALMIC at 17:27

## 2021-03-21 RX ADMIN — INSULIN LISPRO 1 UNITS: 100 INJECTION, SOLUTION INTRAVENOUS; SUBCUTANEOUS at 17:25

## 2021-03-21 RX ADMIN — PANTOPRAZOLE SODIUM 40 MG: 40 TABLET, DELAYED RELEASE ORAL at 06:01

## 2021-03-21 RX ADMIN — BIMATOPROST 1 DROP: 0.1 SOLUTION/ DROPS OPHTHALMIC at 22:33

## 2021-03-21 RX ADMIN — METOPROLOL SUCCINATE 25 MG: 25 TABLET, FILM COATED, EXTENDED RELEASE ORAL at 08:40

## 2021-03-21 RX ADMIN — CLOPIDOGREL BISULFATE 75 MG: 75 TABLET, FILM COATED ORAL at 08:40

## 2021-03-21 NOTE — PROGRESS NOTES
INTERNAL MEDICINE RESIDENCY SENIOR ADMISSION NOTE     Name: Erwin Briseno   Age & Sex: 72 y o  male   MRN: [id number]  Unit/Bed#: -01   Encounter: [id number]  Primary Care Provider: Summer Pepe MD    Admit to team: SOD Team C     Patient seen and examined  Reviewed H&P per Dr Benites Lot   Agree with the assessment and plan with any exception/addition as noted below:    Principal Problem:    Left carotid artery stenosis  Active Problems:    Diabetes mellitus (HCC)    TIA (transient ischemic attack)    Coronary artery disease    Hypertensive urgency    Hyperlipidemia    Patient transferred secondary to left carotid artery stenosis, vascular aspects left ICA stenosis closer to 90%  Vascular surgery following, plans for upcoming surgical intervention March 22, 2021  Currently on dual anti-platelet therapy, statin therapy  Neurologically intact  Blood pressure, vitals stable      Code Status: Level 1 - Full Code  Admission Status: INPATIENT   Disposition: Patient requires Med/Surg  Expected Length of Stay:  Greater than [address] [facility], [place] [address] Internal Medicine PGY-2

## 2021-03-21 NOTE — NURSING NOTE
Report called to [facility] by Tomás Braden  Patient aware transport taking place  All questions answered previous to transport arrival  Patient transported via transport team  Report given to transport   AVS given to transport team
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
## 2021-03-21 NOTE — PLAN OF CARE
Problem: NEUROSENSORY - ADULT  Goal: Achieves stable or improved neurological status  Description: INTERVENTIONS  - Monitor and report changes in neurological status  - Monitor vital signs such as temperature, blood pressure, glucose, and any other labs ordered   - Initiate measures to prevent increased intracranial pressure  - Monitor for seizure activity and implement precautions if appropriate      Outcome: Progressing     Problem: DISCHARGE PLANNING  Goal: Discharge to home or other facility with appropriate resources  Description: INTERVENTIONS:  - Identify barriers to discharge w/patient and caregiver  - Arrange for needed discharge resources and transportation as appropriate  - Identify discharge learning needs (meds, wound care, etc )  - Arrange for interpretive services to assist at discharge as needed  - Refer to Case Management Department for coordinating discharge planning if the patient needs post-hospital services based on physician/advanced practitioner order or complex needs related to functional status, cognitive ability, or social support system  Outcome: Progressing     Problem: Knowledge Deficit  Goal: Patient/family/caregiver demonstrates understanding of disease process, treatment plan, medications, and discharge instructions  Description: Complete learning assessment and assess knowledge base    Interventions:  - Provide teaching at level of understanding  - Provide teaching via preferred learning methods  Outcome: Progressing

## 2021-03-21 NOTE — ANESTHESIA PREPROCEDURE EVALUATION
Procedure:  ENDARTERECTOMY ARTERY CAROTID (Left Neck)    Relevant Problems   ANESTHESIA (within normal limits)      CARDIO   (+) Coronary artery disease (s/p ED x1 left Cx 2017)   (+) Hyperlipidemia   (+) Hypertensive urgency   (+) Left carotid artery stenosis      ENDO   (+) Diabetes mellitus, type 2 (HCC)      GI/HEPATIC   (+) Fatty liver   (+) GERD (gastroesophageal reflux disease)      /RENAL (within normal limits)      HEMATOLOGY   (+) Hypercoagulable state (HCC)      NEURO/PSYCH   (+) H/O blood clots   (+) TIA (transient ischemic attack)      PULMONARY   (+) Sleep apnea      Other   (+) Emanuel's esophagus   (+) CPAP (continuous positive airway pressure) dependence   (+) Carpal tunnel syndrome, bilateral   (+) Factor 5 Leiden mutation, heterozygous ([address] )      TTE 3/19/2021:  LEFT VENTRICLE:  Systolic function was normal by visual assessment  Ejection fraction was estimated in the range of 55 % to 60 %  There were no regional wall motion abnormalities  There was mild concentric hypertrophy  Doppler parameters were consistent with abnormal left ventricular relaxation (grade 1 diastolic dysfunction)      RIGHT VENTRICLE: The size was normal  Systolic function was normal  DOPPLER: Systolic pressure was within the normal range  ATRIAL SEPTUM:  No defect or patent foramen ovale was identified  A bubble study was performed  There was no right-to-left shunt, in the baseline state      MITRAL VALVE:  There was mild regurgitation      AORTIC VALVE:  There was no evidence for stenosis  There was no regurgitation      TRICUSPID VALVE:  There was mild regurgitation  The tricuspid jet envelope definition was inadequate for estimation of RV systolic pressure      EKG 3/18/2021:  Normal sinus rhythm  Right bundle branch block  Abnormal ECG  When compared with ECG of 12-MAR-2021 01:42,  No significant change was found    Lab Results   Component Value Date    WBC 8 22 03/22/2021    HGB 14 4 03/22/2021    HCT 44 8 03/22/2021    MCV 91 03/22/2021     03/22/2021     Lab Results   Component Value Date    SODIUM 141 03/22/2021    K 3 9 03/22/2021     (H) 03/22/2021    CO2 24 03/22/2021    BUN 23 03/22/2021    CREATININE 1 25 03/22/2021    GLUC 150 (H) 03/22/2021    CALCIUM 9 0 03/22/2021     Lab Results   Component Value Date    INR 0 91 03/12/2021    INR 1 00 05/08/2019    INR 1 0 08/06/2018    PROTIME 12 2 03/12/2021    PROTIME 10 5 05/08/2019    PROTIME 10 5 08/06/2018     Lab Results   Component Value Date    HGBA1C 7 2 (H) 03/19/2021            Physical Exam    Airway    Mallampati score: II  TM Distance: >3 FB  Neck ROM: full     Dental   No notable dental hx     Cardiovascular  Cardiovascular exam normal    Pulmonary  Pulmonary exam normal     Other Findings        Anesthesia Plan  ASA Score- 3     Anesthesia Type- general with ASA Monitors  Additional Monitors: arterial line  Airway Plan: ETT  Comment: Preinduction arterial line  GETA          Plan Factors-Exercise tolerance (METS): >4 METS  Chart reviewed  EKG reviewed  Imaging results reviewed  Existing labs reviewed  Patient summary reviewed  Induction- intravenous  Postoperative Plan-   Planned trial extubation    Informed Consent- Anesthetic plan and risks discussed with patient  I personally reviewed this patient with the CRNA  Discussed and agreed on the Anesthesia Plan with the CRNA  John Shaw

## 2021-03-22 ENCOUNTER — APPOINTMENT (INPATIENT)
Dept: NON INVASIVE DIAGNOSTICS | Facility: HOSPITAL | Age: 66
DRG: 038 | End: 2021-03-22
Payer: COMMERCIAL

## 2021-03-22 ENCOUNTER — ANESTHESIA (INPATIENT)
Dept: PERIOP | Facility: HOSPITAL | Age: 66
DRG: 038 | End: 2021-03-22
Payer: COMMERCIAL

## 2021-03-22 PROBLEM — D68.59 HYPERCOAGULABLE STATE (HCC): Status: ACTIVE | Noted: 2021-03-22

## 2021-03-22 LAB
ALBUMIN SERPL BCP-MCNC: 3.6 G/DL (ref 3.5–5)
ALBUMIN SERPL BCP-MCNC: 3.6 G/DL (ref 3.5–5)
ALP SERPL-CCNC: 56 U/L (ref 46–116)
ALP SERPL-CCNC: 59 U/L (ref 46–116)
ALT SERPL W P-5'-P-CCNC: 31 U/L (ref 12–78)
ALT SERPL W P-5'-P-CCNC: 34 U/L (ref 12–78)
ANION GAP SERPL CALCULATED.3IONS-SCNC: 6 MMOL/L (ref 4–13)
ANION GAP SERPL CALCULATED.3IONS-SCNC: 7 MMOL/L (ref 4–13)
APTT PPP: 28 SECONDS (ref 23–37)
AST SERPL W P-5'-P-CCNC: 12 U/L (ref 5–45)
AST SERPL W P-5'-P-CCNC: 17 U/L (ref 5–45)
BASOPHILS # BLD AUTO: 0.07 THOUSANDS/ΜL (ref 0–0.1)
BASOPHILS NFR BLD AUTO: 1 % (ref 0–1)
BILIRUB SERPL-MCNC: 0.4 MG/DL (ref 0.2–1)
BILIRUB SERPL-MCNC: 0.45 MG/DL (ref 0.2–1)
BUN SERPL-MCNC: 21 MG/DL (ref 5–25)
BUN SERPL-MCNC: 23 MG/DL (ref 5–25)
CALCIUM SERPL-MCNC: 8.6 MG/DL (ref 8.3–10.1)
CALCIUM SERPL-MCNC: 9 MG/DL (ref 8.3–10.1)
CHLORIDE SERPL-SCNC: 110 MMOL/L (ref 100–108)
CHLORIDE SERPL-SCNC: 111 MMOL/L (ref 100–108)
CO2 SERPL-SCNC: 24 MMOL/L (ref 21–32)
CO2 SERPL-SCNC: 24 MMOL/L (ref 21–32)
CREAT SERPL-MCNC: 1.25 MG/DL (ref 0.6–1.3)
CREAT SERPL-MCNC: 1.4 MG/DL (ref 0.6–1.3)
EOSINOPHIL # BLD AUTO: 0.05 THOUSAND/ΜL (ref 0–0.61)
EOSINOPHIL NFR BLD AUTO: 0 % (ref 0–6)
ERYTHROCYTE [DISTWIDTH] IN BLOOD BY AUTOMATED COUNT: 13.6 % (ref 11.6–15.1)
ERYTHROCYTE [DISTWIDTH] IN BLOOD BY AUTOMATED COUNT: 13.6 % (ref 11.6–15.1)
GFR SERPL CREATININE-BSD FRML MDRD: 52 ML/MIN/1.73SQ M
GFR SERPL CREATININE-BSD FRML MDRD: 60 ML/MIN/1.73SQ M
GLUCOSE SERPL-MCNC: 148 MG/DL (ref 65–140)
GLUCOSE SERPL-MCNC: 150 MG/DL (ref 65–140)
GLUCOSE SERPL-MCNC: 200 MG/DL (ref 65–140)
GLUCOSE SERPL-MCNC: 209 MG/DL (ref 65–140)
GLUCOSE SERPL-MCNC: 222 MG/DL (ref 65–140)
HCT VFR BLD AUTO: 43.6 % (ref 36.5–49.3)
HCT VFR BLD AUTO: 44.8 % (ref 36.5–49.3)
HGB BLD-MCNC: 13.9 G/DL (ref 12–17)
HGB BLD-MCNC: 14.4 G/DL (ref 12–17)
IMM GRANULOCYTES # BLD AUTO: 0.11 THOUSAND/UL (ref 0–0.2)
IMM GRANULOCYTES NFR BLD AUTO: 1 % (ref 0–2)
INR PPP: 1.02 (ref 0.84–1.19)
KCT BLD-ACNC: 115 SEC (ref 89–137)
KCT BLD-ACNC: 195 SEC (ref 89–137)
KCT BLD-ACNC: 207 SEC (ref 89–137)
KCT BLD-ACNC: 207 SEC (ref 89–137)
KCT BLD-ACNC: 237 SEC (ref 89–137)
LYMPHOCYTES # BLD AUTO: 1.74 THOUSANDS/ΜL (ref 0.6–4.47)
LYMPHOCYTES NFR BLD AUTO: 11 % (ref 14–44)
MAGNESIUM SERPL-MCNC: 2.4 MG/DL (ref 1.6–2.6)
MCH RBC QN AUTO: 29 PG (ref 26.8–34.3)
MCH RBC QN AUTO: 29.1 PG (ref 26.8–34.3)
MCHC RBC AUTO-ENTMCNC: 31.9 G/DL (ref 31.4–37.4)
MCHC RBC AUTO-ENTMCNC: 32.1 G/DL (ref 31.4–37.4)
MCV RBC AUTO: 91 FL (ref 82–98)
MCV RBC AUTO: 91 FL (ref 82–98)
MONOCYTES # BLD AUTO: 0.26 THOUSAND/ΜL (ref 0.17–1.22)
MONOCYTES NFR BLD AUTO: 2 % (ref 4–12)
NEUTROPHILS # BLD AUTO: 13.04 THOUSANDS/ΜL (ref 1.85–7.62)
NEUTS SEG NFR BLD AUTO: 85 % (ref 43–75)
NRBC BLD AUTO-RTO: 0 /100 WBCS
PHOSPHATE SERPL-MCNC: 3 MG/DL (ref 2.3–4.1)
PLATELET # BLD AUTO: 195 THOUSANDS/UL (ref 149–390)
PLATELET # BLD AUTO: 210 THOUSANDS/UL (ref 149–390)
PMV BLD AUTO: 12.5 FL (ref 8.9–12.7)
PMV BLD AUTO: 12.6 FL (ref 8.9–12.7)
POTASSIUM SERPL-SCNC: 3.9 MMOL/L (ref 3.5–5.3)
POTASSIUM SERPL-SCNC: 4.2 MMOL/L (ref 3.5–5.3)
PROT SERPL-MCNC: 6.6 G/DL (ref 6.4–8.2)
PROT SERPL-MCNC: 6.8 G/DL (ref 6.4–8.2)
PROTHROMBIN TIME: 13.4 SECONDS (ref 11.6–14.5)
RBC # BLD AUTO: 4.8 MILLION/UL (ref 3.88–5.62)
RBC # BLD AUTO: 4.94 MILLION/UL (ref 3.88–5.62)
SODIUM SERPL-SCNC: 141 MMOL/L (ref 136–145)
SODIUM SERPL-SCNC: 141 MMOL/L (ref 136–145)
SPECIMEN SOURCE: ABNORMAL
SPECIMEN SOURCE: NORMAL
WBC # BLD AUTO: 15.27 THOUSAND/UL (ref 4.31–10.16)
WBC # BLD AUTO: 8.22 THOUSAND/UL (ref 4.31–10.16)

## 2021-03-22 PROCEDURE — 03QN0ZZ REPAIR LEFT EXTERNAL CAROTID ARTERY, OPEN APPROACH: ICD-10-PCS | Performed by: SURGERY

## 2021-03-22 PROCEDURE — 03CL0ZZ EXTIRPATION OF MATTER FROM LEFT INTERNAL CAROTID ARTERY, OPEN APPROACH: ICD-10-PCS | Performed by: SURGERY

## 2021-03-22 PROCEDURE — 99232 SBSQ HOSP IP/OBS MODERATE 35: CPT | Performed by: INTERNAL MEDICINE

## 2021-03-22 PROCEDURE — 85610 PROTHROMBIN TIME: CPT | Performed by: SURGERY

## 2021-03-22 PROCEDURE — 80053 COMPREHEN METABOLIC PANEL: CPT | Performed by: STUDENT IN AN ORGANIZED HEALTH CARE EDUCATION/TRAINING PROGRAM

## 2021-03-22 PROCEDURE — 85347 COAGULATION TIME ACTIVATED: CPT

## 2021-03-22 PROCEDURE — 35301 RECHANNELING OF ARTERY: CPT | Performed by: SURGERY

## 2021-03-22 PROCEDURE — 83735 ASSAY OF MAGNESIUM: CPT | Performed by: STUDENT IN AN ORGANIZED HEALTH CARE EDUCATION/TRAINING PROGRAM

## 2021-03-22 PROCEDURE — 85730 THROMBOPLASTIN TIME PARTIAL: CPT | Performed by: SURGERY

## 2021-03-22 PROCEDURE — 85027 COMPLETE CBC AUTOMATED: CPT | Performed by: STUDENT IN AN ORGANIZED HEALTH CARE EDUCATION/TRAINING PROGRAM

## 2021-03-22 PROCEDURE — 85025 COMPLETE CBC W/AUTO DIFF WBC: CPT | Performed by: SURGERY

## 2021-03-22 PROCEDURE — 99024 POSTOP FOLLOW-UP VISIT: CPT | Performed by: SURGERY

## 2021-03-22 PROCEDURE — 84100 ASSAY OF PHOSPHORUS: CPT | Performed by: STUDENT IN AN ORGANIZED HEALTH CARE EDUCATION/TRAINING PROGRAM

## 2021-03-22 PROCEDURE — C1781 MESH (IMPLANTABLE): HCPCS | Performed by: SURGERY

## 2021-03-22 PROCEDURE — 82948 REAGENT STRIP/BLOOD GLUCOSE: CPT

## 2021-03-22 PROCEDURE — 99024 POSTOP FOLLOW-UP VISIT: CPT | Performed by: PHYSICIAN ASSISTANT

## 2021-03-22 PROCEDURE — 93882 EXTRACRANIAL UNI/LTD STUDY: CPT

## 2021-03-22 PROCEDURE — 03UL0KZ SUPPLEMENT LEFT INTERNAL CAROTID ARTERY WITH NONAUTOLOGOUS TISSUE SUBSTITUTE, OPEN APPROACH: ICD-10-PCS | Performed by: SURGERY

## 2021-03-22 PROCEDURE — 80053 COMPREHEN METABOLIC PANEL: CPT | Performed by: SURGERY

## 2021-03-22 DEVICE — XENOSURE BIOLOGIC PATCH, 0.8CM X 8CM, EIFU
Type: IMPLANTABLE DEVICE | Site: CAROTID | Status: FUNCTIONAL
Brand: XENOSURE BIOLOGIC PATCH

## 2021-03-22 RX ORDER — HEPARIN SODIUM 5000 [USP'U]/ML
5000 INJECTION, SOLUTION INTRAVENOUS; SUBCUTANEOUS EVERY 12 HOURS SCHEDULED
Status: DISCONTINUED | OUTPATIENT
Start: 2021-03-23 | End: 2021-03-25 | Stop reason: HOSPADM

## 2021-03-22 RX ORDER — OXYCODONE HYDROCHLORIDE 5 MG/1
5 TABLET ORAL EVERY 4 HOURS PRN
Status: DISCONTINUED | OUTPATIENT
Start: 2021-03-22 | End: 2021-03-25 | Stop reason: HOSPADM

## 2021-03-22 RX ORDER — METFORMIN HYDROCHLORIDE 750 MG/1
750 TABLET, EXTENDED RELEASE ORAL
Status: DISCONTINUED | OUTPATIENT
Start: 2021-03-23 | End: 2021-03-23

## 2021-03-22 RX ORDER — MIDAZOLAM HYDROCHLORIDE 2 MG/2ML
INJECTION, SOLUTION INTRAMUSCULAR; INTRAVENOUS AS NEEDED
Status: DISCONTINUED | OUTPATIENT
Start: 2021-03-22 | End: 2021-03-22

## 2021-03-22 RX ORDER — CEFAZOLIN SODIUM 2 G/50ML
2000 SOLUTION INTRAVENOUS
Status: DISCONTINUED | OUTPATIENT
Start: 2021-03-22 | End: 2021-03-22 | Stop reason: HOSPADM

## 2021-03-22 RX ORDER — PROPOFOL 10 MG/ML
INJECTION, EMULSION INTRAVENOUS AS NEEDED
Status: DISCONTINUED | OUTPATIENT
Start: 2021-03-22 | End: 2021-03-22

## 2021-03-22 RX ORDER — LABETALOL 20 MG/4 ML (5 MG/ML) INTRAVENOUS SYRINGE
AS NEEDED
Status: DISCONTINUED | OUTPATIENT
Start: 2021-03-22 | End: 2021-03-22

## 2021-03-22 RX ORDER — EPHEDRINE SULFATE 50 MG/ML
INJECTION INTRAVENOUS AS NEEDED
Status: DISCONTINUED | OUTPATIENT
Start: 2021-03-22 | End: 2021-03-22

## 2021-03-22 RX ORDER — METOPROLOL SUCCINATE 25 MG/1
25 TABLET, EXTENDED RELEASE ORAL EVERY MORNING
Status: DISCONTINUED | OUTPATIENT
Start: 2021-03-23 | End: 2021-03-22

## 2021-03-22 RX ORDER — LABETALOL 20 MG/4 ML (5 MG/ML) INTRAVENOUS SYRINGE
5
Status: COMPLETED | OUTPATIENT
Start: 2021-03-22 | End: 2021-03-22

## 2021-03-22 RX ORDER — HEPARIN SODIUM 1000 [USP'U]/ML
INJECTION, SOLUTION INTRAVENOUS; SUBCUTANEOUS AS NEEDED
Status: DISCONTINUED | OUTPATIENT
Start: 2021-03-22 | End: 2021-03-22

## 2021-03-22 RX ORDER — GLYCOPYRROLATE 0.2 MG/ML
INJECTION INTRAMUSCULAR; INTRAVENOUS AS NEEDED
Status: DISCONTINUED | OUTPATIENT
Start: 2021-03-22 | End: 2021-03-22

## 2021-03-22 RX ORDER — ASPIRIN 81 MG/1
81 TABLET, CHEWABLE ORAL EVERY MORNING
Status: DISCONTINUED | OUTPATIENT
Start: 2021-03-23 | End: 2021-03-25 | Stop reason: HOSPADM

## 2021-03-22 RX ORDER — DEXAMETHASONE SODIUM PHOSPHATE 10 MG/ML
INJECTION, SOLUTION INTRAMUSCULAR; INTRAVENOUS AS NEEDED
Status: DISCONTINUED | OUTPATIENT
Start: 2021-03-22 | End: 2021-03-22

## 2021-03-22 RX ORDER — SODIUM CHLORIDE, SODIUM LACTATE, POTASSIUM CHLORIDE, CALCIUM CHLORIDE 600; 310; 30; 20 MG/100ML; MG/100ML; MG/100ML; MG/100ML
INJECTION, SOLUTION INTRAVENOUS CONTINUOUS PRN
Status: DISCONTINUED | OUTPATIENT
Start: 2021-03-22 | End: 2021-03-22

## 2021-03-22 RX ORDER — LIDOCAINE HYDROCHLORIDE 10 MG/ML
INJECTION, SOLUTION EPIDURAL; INFILTRATION; INTRACAUDAL; PERINEURAL AS NEEDED
Status: DISCONTINUED | OUTPATIENT
Start: 2021-03-22 | End: 2021-03-22 | Stop reason: HOSPADM

## 2021-03-22 RX ORDER — ROCURONIUM BROMIDE 10 MG/ML
INJECTION, SOLUTION INTRAVENOUS AS NEEDED
Status: DISCONTINUED | OUTPATIENT
Start: 2021-03-22 | End: 2021-03-22

## 2021-03-22 RX ORDER — ATORVASTATIN CALCIUM 40 MG/1
40 TABLET, FILM COATED ORAL
Status: DISCONTINUED | OUTPATIENT
Start: 2021-03-22 | End: 2021-03-22

## 2021-03-22 RX ORDER — PANTOPRAZOLE SODIUM 40 MG/1
40 TABLET, DELAYED RELEASE ORAL
Status: DISCONTINUED | OUTPATIENT
Start: 2021-03-23 | End: 2021-03-25 | Stop reason: HOSPADM

## 2021-03-22 RX ORDER — FENTANYL CITRATE/PF 50 MCG/ML
25 SYRINGE (ML) INJECTION
Status: DISCONTINUED | OUTPATIENT
Start: 2021-03-22 | End: 2021-03-22 | Stop reason: HOSPADM

## 2021-03-22 RX ORDER — FENTANYL CITRATE 50 UG/ML
INJECTION, SOLUTION INTRAMUSCULAR; INTRAVENOUS AS NEEDED
Status: DISCONTINUED | OUTPATIENT
Start: 2021-03-22 | End: 2021-03-22

## 2021-03-22 RX ORDER — LIDOCAINE HYDROCHLORIDE 10 MG/ML
INJECTION, SOLUTION EPIDURAL; INFILTRATION; INTRACAUDAL; PERINEURAL
Status: COMPLETED | OUTPATIENT
Start: 2021-03-22 | End: 2021-03-22

## 2021-03-22 RX ORDER — HYDROMORPHONE HCL IN WATER/PF 6 MG/30 ML
0.2 PATIENT CONTROLLED ANALGESIA SYRINGE INTRAVENOUS
Status: DISCONTINUED | OUTPATIENT
Start: 2021-03-22 | End: 2021-03-22 | Stop reason: HOSPADM

## 2021-03-22 RX ORDER — HYDROMORPHONE HCL/PF 1 MG/ML
0.2 SYRINGE (ML) INJECTION
Status: DISCONTINUED | OUTPATIENT
Start: 2021-03-22 | End: 2021-03-23

## 2021-03-22 RX ORDER — BUPIVACAINE HYDROCHLORIDE AND EPINEPHRINE 5; 5 MG/ML; UG/ML
INJECTION, SOLUTION PERINEURAL AS NEEDED
Status: DISCONTINUED | OUTPATIENT
Start: 2021-03-22 | End: 2021-03-22 | Stop reason: HOSPADM

## 2021-03-22 RX ORDER — HYDRALAZINE HYDROCHLORIDE 20 MG/ML
15 INJECTION INTRAMUSCULAR; INTRAVENOUS
Status: DISCONTINUED | OUTPATIENT
Start: 2021-03-22 | End: 2021-03-24

## 2021-03-22 RX ORDER — CEFAZOLIN SODIUM 2 G/50ML
2000 SOLUTION INTRAVENOUS EVERY 8 HOURS
Status: COMPLETED | OUTPATIENT
Start: 2021-03-22 | End: 2021-03-23

## 2021-03-22 RX ORDER — LIDOCAINE HYDROCHLORIDE 10 MG/ML
INJECTION, SOLUTION EPIDURAL; INFILTRATION; INTRACAUDAL; PERINEURAL AS NEEDED
Status: DISCONTINUED | OUTPATIENT
Start: 2021-03-22 | End: 2021-03-22

## 2021-03-22 RX ORDER — ONDANSETRON 2 MG/ML
4 INJECTION INTRAMUSCULAR; INTRAVENOUS ONCE AS NEEDED
Status: DISCONTINUED | OUTPATIENT
Start: 2021-03-22 | End: 2021-03-22 | Stop reason: HOSPADM

## 2021-03-22 RX ORDER — LOSARTAN POTASSIUM 50 MG/1
50 TABLET ORAL EVERY MORNING
Status: DISCONTINUED | OUTPATIENT
Start: 2021-03-23 | End: 2021-03-23

## 2021-03-22 RX ORDER — ONDANSETRON 2 MG/ML
INJECTION INTRAMUSCULAR; INTRAVENOUS AS NEEDED
Status: DISCONTINUED | OUTPATIENT
Start: 2021-03-22 | End: 2021-03-22

## 2021-03-22 RX ORDER — PROTAMINE SULFATE 10 MG/ML
INJECTION, SOLUTION INTRAVENOUS AS NEEDED
Status: DISCONTINUED | OUTPATIENT
Start: 2021-03-22 | End: 2021-03-22

## 2021-03-22 RX ORDER — CEFAZOLIN SODIUM 1 G/50ML
1000 SOLUTION INTRAVENOUS ONCE
Status: COMPLETED | OUTPATIENT
Start: 2021-03-22 | End: 2021-03-22

## 2021-03-22 RX ORDER — NEOSTIGMINE METHYLSULFATE 1 MG/ML
INJECTION INTRAVENOUS AS NEEDED
Status: DISCONTINUED | OUTPATIENT
Start: 2021-03-22 | End: 2021-03-22

## 2021-03-22 RX ADMIN — PHENYLEPHRINE HYDROCHLORIDE 50 MCG: 10 INJECTION INTRAVENOUS at 13:15

## 2021-03-22 RX ADMIN — TOBRAMYCIN AND DEXAMETHASONE 1 DROP: 3; 1 SUSPENSION/ DROPS OPHTHALMIC at 00:07

## 2021-03-22 RX ADMIN — LIDOCAINE HYDROCHLORIDE 50 MG: 10 INJECTION, SOLUTION EPIDURAL; INFILTRATION; INTRACAUDAL; PERINEURAL at 11:31

## 2021-03-22 RX ADMIN — ROCURONIUM BROMIDE 10 MG: 50 INJECTION, SOLUTION INTRAVENOUS at 12:48

## 2021-03-22 RX ADMIN — PHENYLEPHRINE HYDROCHLORIDE 50 MCG: 10 INJECTION INTRAVENOUS at 14:35

## 2021-03-22 RX ADMIN — PHENYLEPHRINE HYDROCHLORIDE 50 MCG: 10 INJECTION INTRAVENOUS at 13:25

## 2021-03-22 RX ADMIN — GLYCOPYRROLATE 0.5 MG: 0.2 INJECTION, SOLUTION INTRAMUSCULAR; INTRAVENOUS at 15:31

## 2021-03-22 RX ADMIN — EPHEDRINE SULFATE 10 MG: 50 INJECTION, SOLUTION INTRAVENOUS at 12:40

## 2021-03-22 RX ADMIN — BIMATOPROST 1 DROP: 0.1 SOLUTION/ DROPS OPHTHALMIC at 21:29

## 2021-03-22 RX ADMIN — CHLORHEXIDINE GLUCONATE 0.12% ORAL RINSE 15 ML: 1.2 LIQUID ORAL at 00:07

## 2021-03-22 RX ADMIN — LABETALOL 20 MG/4 ML (5 MG/ML) INTRAVENOUS SYRINGE 5 MG: at 12:46

## 2021-03-22 RX ADMIN — PHENYLEPHRINE HYDROCHLORIDE 50 MCG: 10 INJECTION INTRAVENOUS at 13:20

## 2021-03-22 RX ADMIN — PROPOFOL 100 MG: 10 INJECTION, EMULSION INTRAVENOUS at 11:45

## 2021-03-22 RX ADMIN — FENTANYL CITRATE 50 MCG: 50 INJECTION INTRAMUSCULAR; INTRAVENOUS at 12:30

## 2021-03-22 RX ADMIN — LABETALOL 20 MG/4 ML (5 MG/ML) INTRAVENOUS SYRINGE 5 MG: at 12:06

## 2021-03-22 RX ADMIN — LORATADINE 10 MG: 10 TABLET ORAL at 08:24

## 2021-03-22 RX ADMIN — OXYCODONE HYDROCHLORIDE 5 MG: 5 TABLET ORAL at 23:10

## 2021-03-22 RX ADMIN — PROPOFOL 200 MG: 10 INJECTION, EMULSION INTRAVENOUS at 11:31

## 2021-03-22 RX ADMIN — CLOPIDOGREL BISULFATE 75 MG: 75 TABLET, FILM COATED ORAL at 08:23

## 2021-03-22 RX ADMIN — ROCURONIUM BROMIDE 50 MG: 50 INJECTION, SOLUTION INTRAVENOUS at 11:31

## 2021-03-22 RX ADMIN — LIDOCAINE HYDROCHLORIDE 30 MG: 10 INJECTION, SOLUTION EPIDURAL; INFILTRATION; INTRACAUDAL; PERINEURAL at 14:56

## 2021-03-22 RX ADMIN — HEPARIN SODIUM 6000 UNITS: 1000 INJECTION INTRAVENOUS; SUBCUTANEOUS at 12:37

## 2021-03-22 RX ADMIN — FENTANYL CITRATE 100 MCG: 50 INJECTION INTRAMUSCULAR; INTRAVENOUS at 11:31

## 2021-03-22 RX ADMIN — PHENYLEPHRINE HYDROCHLORIDE 10 MCG/MIN: 10 INJECTION INTRAVENOUS at 12:22

## 2021-03-22 RX ADMIN — Medication 25 MCG: at 16:27

## 2021-03-22 RX ADMIN — PHENYLEPHRINE HYDROCHLORIDE 50 MCG: 10 INJECTION INTRAVENOUS at 14:16

## 2021-03-22 RX ADMIN — Medication 25 MCG: at 16:43

## 2021-03-22 RX ADMIN — FENTANYL CITRATE 50 MCG: 50 INJECTION INTRAMUSCULAR; INTRAVENOUS at 12:46

## 2021-03-22 RX ADMIN — LIDOCAINE HYDROCHLORIDE 0.5 ML: 10 INJECTION, SOLUTION EPIDURAL; INFILTRATION; INTRACAUDAL; PERINEURAL at 11:29

## 2021-03-22 RX ADMIN — PHENYLEPHRINE HYDROCHLORIDE 100 MCG: 10 INJECTION INTRAVENOUS at 14:58

## 2021-03-22 RX ADMIN — ONDANSETRON 4 MG: 2 INJECTION INTRAMUSCULAR; INTRAVENOUS at 14:55

## 2021-03-22 RX ADMIN — HYDRALAZINE HYDROCHLORIDE 15 MG: 20 INJECTION, SOLUTION INTRAMUSCULAR; INTRAVENOUS at 17:00

## 2021-03-22 RX ADMIN — PHENYLEPHRINE HYDROCHLORIDE 50 MCG: 10 INJECTION INTRAVENOUS at 14:12

## 2021-03-22 RX ADMIN — CEFAZOLIN SODIUM 2000 MG: 1 SOLUTION INTRAVENOUS at 15:14

## 2021-03-22 RX ADMIN — LABETALOL 20 MG/4 ML (5 MG/ML) INTRAVENOUS SYRINGE 5 MG: at 14:32

## 2021-03-22 RX ADMIN — SODIUM CHLORIDE 75 ML/HR: 0.9 INJECTION, SOLUTION INTRAVENOUS at 16:54

## 2021-03-22 RX ADMIN — Medication 1000 UNITS: at 08:23

## 2021-03-22 RX ADMIN — PHENYLEPHRINE HYDROCHLORIDE 100 MCG: 10 INJECTION INTRAVENOUS at 14:54

## 2021-03-22 RX ADMIN — PHENYLEPHRINE HYDROCHLORIDE 50 MCG: 10 INJECTION INTRAVENOUS at 12:39

## 2021-03-22 RX ADMIN — DEXAMETHASONE SODIUM PHOSPHATE 5 MG: 10 INJECTION, SOLUTION INTRAMUSCULAR; INTRAVENOUS at 12:02

## 2021-03-22 RX ADMIN — SODIUM CHLORIDE 10 MG/HR: 0.9 INJECTION, SOLUTION INTRAVENOUS at 20:55

## 2021-03-22 RX ADMIN — ENOXAPARIN SODIUM 40 MG: 40 INJECTION SUBCUTANEOUS at 08:26

## 2021-03-22 RX ADMIN — OXYCODONE HYDROCHLORIDE 5 MG: 5 TABLET ORAL at 19:20

## 2021-03-22 RX ADMIN — TOBRAMYCIN AND DEXAMETHASONE 1 DROP: 3; 1 SUSPENSION/ DROPS OPHTHALMIC at 06:05

## 2021-03-22 RX ADMIN — PROTAMINE SULFATE 40 MG: 10 INJECTION, SOLUTION INTRAVENOUS at 14:52

## 2021-03-22 RX ADMIN — SODIUM CHLORIDE, SODIUM LACTATE, POTASSIUM CHLORIDE, AND CALCIUM CHLORIDE: .6; .31; .03; .02 INJECTION, SOLUTION INTRAVENOUS at 11:12

## 2021-03-22 RX ADMIN — PHENYLEPHRINE HYDROCHLORIDE 50 MCG: 10 INJECTION INTRAVENOUS at 14:11

## 2021-03-22 RX ADMIN — PROPOFOL 100 MG: 10 INJECTION, EMULSION INTRAVENOUS at 11:36

## 2021-03-22 RX ADMIN — LABETALOL 20 MG/4 ML (5 MG/ML) INTRAVENOUS SYRINGE 5 MG: at 13:57

## 2021-03-22 RX ADMIN — HEPARIN SODIUM 2000 UNITS: 1000 INJECTION INTRAVENOUS; SUBCUTANEOUS at 13:38

## 2021-03-22 RX ADMIN — FENTANYL CITRATE 50 MCG: 50 INJECTION INTRAMUSCULAR; INTRAVENOUS at 14:30

## 2021-03-22 RX ADMIN — HEPARIN SODIUM 1000 UNITS: 1000 INJECTION INTRAVENOUS; SUBCUTANEOUS at 13:12

## 2021-03-22 RX ADMIN — INSULIN LISPRO 1 UNITS: 100 INJECTION, SOLUTION INTRAVENOUS; SUBCUTANEOUS at 21:29

## 2021-03-22 RX ADMIN — ASPIRIN 81 MG: 81 TABLET, CHEWABLE ORAL at 08:26

## 2021-03-22 RX ADMIN — LABETALOL 20 MG/4 ML (5 MG/ML) INTRAVENOUS SYRINGE 5 MG: at 12:30

## 2021-03-22 RX ADMIN — CEFAZOLIN SODIUM 2000 MG: 2 SOLUTION INTRAVENOUS at 19:21

## 2021-03-22 RX ADMIN — CEFAZOLIN SODIUM 2000 MG: 1 SOLUTION INTRAVENOUS at 11:15

## 2021-03-22 RX ADMIN — PHENYLEPHRINE HYDROCHLORIDE 100 MCG: 10 INJECTION INTRAVENOUS at 13:01

## 2021-03-22 RX ADMIN — LABETALOL 20 MG/4 ML (5 MG/ML) INTRAVENOUS SYRINGE 5 MG: at 16:55

## 2021-03-22 RX ADMIN — SODIUM CHLORIDE 5 MG/HR: 0.9 INJECTION, SOLUTION INTRAVENOUS at 18:48

## 2021-03-22 RX ADMIN — SODIUM CHLORIDE 75 ML/HR: 0.9 INJECTION, SOLUTION INTRAVENOUS at 00:12

## 2021-03-22 RX ADMIN — PHENYLEPHRINE HYDROCHLORIDE 50 MCG: 10 INJECTION INTRAVENOUS at 14:45

## 2021-03-22 RX ADMIN — ATORVASTATIN CALCIUM 80 MG: 80 TABLET, FILM COATED ORAL at 19:20

## 2021-03-22 RX ADMIN — METOPROLOL SUCCINATE 25 MG: 25 TABLET, FILM COATED, EXTENDED RELEASE ORAL at 08:24

## 2021-03-22 RX ADMIN — LABETALOL 20 MG/4 ML (5 MG/ML) INTRAVENOUS SYRINGE 5 MG: at 16:40

## 2021-03-22 RX ADMIN — FENTANYL CITRATE 50 MCG: 50 INJECTION INTRAMUSCULAR; INTRAVENOUS at 13:55

## 2021-03-22 RX ADMIN — PANTOPRAZOLE SODIUM 40 MG: 40 TABLET, DELAYED RELEASE ORAL at 05:53

## 2021-03-22 RX ADMIN — SODIUM CHLORIDE 12.5 MG/HR: 0.9 INJECTION, SOLUTION INTRAVENOUS at 23:19

## 2021-03-22 RX ADMIN — NEOSTIGMINE METHYLSULFATE 4 MG: 1 INJECTION INTRAVENOUS at 15:31

## 2021-03-22 RX ADMIN — PHENYLEPHRINE HYDROCHLORIDE 100 MCG: 10 INJECTION INTRAVENOUS at 12:40

## 2021-03-22 RX ADMIN — INSULIN HUMAN 1 UNITS: 100 INJECTION, SOLUTION PARENTERAL at 16:26

## 2021-03-22 RX ADMIN — SODIUM CHLORIDE, SODIUM LACTATE, POTASSIUM CHLORIDE, AND CALCIUM CHLORIDE: .6; .31; .03; .02 INJECTION, SOLUTION INTRAVENOUS at 14:11

## 2021-03-22 RX ADMIN — PHENYLEPHRINE HYDROCHLORIDE 50 MCG: 10 INJECTION INTRAVENOUS at 13:18

## 2021-03-22 NOTE — ANESTHESIA POSTPROCEDURE EVALUATION
Post-Op Assessment Note    CV Status:  Stable  Pain Score: 2    Pain management: adequate     Mental Status:  Alert, awake and sleepy   Hydration Status:  Euvolemic   PONV Controlled:  Controlled   Airway Patency:  Patent      Post Op Vitals Reviewed: Yes      Staff: Anesthesiologist         No complications documented      /57   Temp 98 6 °F (37 °C) (03/22/21 1558)    Pulse 92 (03/22/21 1558)   Resp (!) 11 (03/22/21 1558)    SpO2   99%

## 2021-03-22 NOTE — PROGRESS NOTES
[facility]  Progress Note - Yifan Price 1955, 72 y o  male MRN: [id number]  Unit/Bed#: -56 Encounter: [id number]  Primary Care Provider: Ruthie Dwyer MD   Date and time admitted to hospital: 3/20/2021  8:42 PM    * Left carotid artery stenosis  Assessment & Plan  71 y/o M w/ PMHx HTN, HLD, DMT2, CAD s/p PCI in 2017, Factor V Leiden, DVT (2013, not on [facility]), who presented with symptomatic L ICA stenosis (Transient Right-sided weakness)    3/18 CTA- Left-70%, Right- nl  3/19 MRI brain- neg    Plan: To OR today for Left CEA  NPO/IVF  Continue ASA, Plavix, statin      Subjective:  Pt doing well, no events overnight    Vitals:  /75   Pulse 65   Temp 97 7 °F (36 5 °C)   Resp 18   Ht 5' 9" (1 753 m)   Wt 76 6 kg (168 lb 12 8 oz)   SpO2 96%   BMI 24 93 kg/m²     I/Os:  I/O last 3 completed shifts: In: 480 [P O :480]  Out: 0   No intake/output data recorded      Lab Results and Cultures:   Lab Results   Component Value Date    WBC 8 22 03/22/2021    HGB 14 4 03/22/2021    HCT 44 8 03/22/2021    MCV 91 03/22/2021     03/22/2021     Lab Results   Component Value Date    CALCIUM 9 0 03/22/2021    K 3 9 03/22/2021    CO2 24 03/22/2021     (H) 03/22/2021    BUN 23 03/22/2021    CREATININE 1 25 03/22/2021     Lab Results   Component Value Date    INR 0 91 03/12/2021    INR 1 00 05/08/2019    INR 1 0 08/06/2018    PROTIME 12 2 03/12/2021    PROTIME 10 5 05/08/2019    PROTIME 10 5 08/06/2018        Blood Culture: No results found for: BLOODCX,   Urinalysis: No results found for: COLORU, CLARITYU, SPECGRAV, PHUR, LEUKOCYTESUR, NITRITE, PROTEINUA, GLUCOSEU, KETONESU, BILIRUBINUR, BLOODU,   Urine Culture: No results found for: URINECX,   Wound Culure: No results found for: WOUNDCULT    Medications:  Current Facility-Administered Medications   Medication Dose Route Frequency    acetaminophen (TYLENOL) tablet 650 mg  650 mg Oral Q6H PRN    aspirin chewable tablet 81 mg  81 mg Oral Daily    atorvastatin (LIPITOR) tablet 80 mg  80 mg Oral QPM    bimatoprost (LUMIGAN) 0 01 % ophthalmic solution 1 drop  1 drop Both Eyes HS    cholecalciferol (VITAMIN D3) tablet 1,000 Units  1,000 Units Oral Daily    clopidogrel (PLAVIX) tablet 75 mg  75 mg Oral Daily    enoxaparin (LOVENOX) subcutaneous injection 40 mg  40 mg Subcutaneous Daily    fluticasone (FLONASE) 50 mcg/act nasal spray 2 spray  2 spray Each Nare Daily    hydrALAZINE (APRESOLINE) injection 5 mg  5 mg Intravenous Q6H PRN    insulin lispro (HumaLOG) 100 units/mL subcutaneous injection 1-5 Units  1-5 Units Subcutaneous HS    insulin lispro (HumaLOG) 100 units/mL subcutaneous injection 1-6 Units  1-6 Units Subcutaneous TID AC    loratadine (CLARITIN) tablet 10 mg  10 mg Oral Daily    metoprolol succinate (TOPROL-XL) 24 hr tablet 25 mg  25 mg Oral Daily    ondansetron (ZOFRAN) injection 4 mg  4 mg Intravenous Q6H PRN    pantoprazole (PROTONIX) EC tablet 40 mg  40 mg Oral Early Morning    sodium chloride 0 9 % infusion  75 mL/hr Intravenous Continuous    tobramycin-dexamethasone (TOBRADEX) 0 3-0 1 % ophthalmic suspension 1 drop  1 drop Left Eye Q6H [address]     Physical Exam:    General appearance: alert and oriented, in no acute distress  Neurologic: Mental status: Alert, oriented, thought content appropriate  Cranial nerves: normal  Sensory: normal  Motor: grossly normal  Head: Normocephalic, without obvious abnormality, atraumatic  Eyes: conjunctivae/corneas clear  PERRL, EOM's intact  Fundi benign    Throat: lips, mucosa, and tongue normal; teeth and gums normal  Neck: no adenopathy, no JVD, supple, symmetrical, trachea midline, thyroid not enlarged, symmetric, no tenderness/mass/nodules and Left carotid bruit  Lungs: clear to auscultation bilaterally  Heart: regular rate and rhythm, S1, S2 normal, no murmur, click, rub or gallop  Abdomen: soft, non-tender; bowel sounds normal; no masses,  no organomegaly  Extremities: extremities normal, warm and well-perfused; no cyanosis, clubbing, or edema      Rowan Weber PA-C  3/22/2021

## 2021-03-22 NOTE — ANESTHESIA PROCEDURE NOTES
Arterial Line Insertion    Date/Time: 3/22/2021 11:29 AM  Performed by: Zeke Huston MD  Authorized by: Zeke Huston MD   Consent: Verbal consent obtained  Written consent obtained  Consent given by: patient  Patient identity confirmed: verbally with patient and arm band  Preparation: Patient was prepped and draped in the usual sterile fashion    Indications: hemodynamic monitoring  Orientation:  Left  Location: radial arterylidocaine (PF) (XYLOCAINE-MPF) 1 % infiltration, 0 5 mL  Procedure Details:  Raymundo's test normal: yes  Needle gauge: 20  Number of attempts: 1    Post-procedure:  Post-procedure: dressing applied  Waveform: good waveform  Patient tolerance: Patient tolerated the procedure well with no immediate complications

## 2021-03-22 NOTE — PROGRESS NOTES
INTERNAL MEDICINE RESIDENCY PROGRESS NOTE     Name: Parvez Rome   Age & Sex: 72 y o  male   MRN: [id number]  Unit/Bed#: OR POOL   Encounter: [id number]  Team: SOD Team C     PATIENT INFORMATION     Name: Parvez Rome   Age & Sex: 72 y o  male   MRN: [id number]  Hospital Stay Days: 2    ASSESSMENT/PLAN     Principal Problem:    Left carotid artery stenosis  Active Problems:    TIA (transient ischemic attack)    Coronary artery disease    Diabetes mellitus, type 2 ([facility] [address] )    Hypertensive urgency    Hyperlipidemia    Emanuel's esophagus    Sleep apnea    CPAP (continuous positive airway pressure) dependence    H/O blood clots    Fatty liver    GERD (gastroesophageal reflux disease)    Hypercoagulable state ([facility] [address] )      TIA (transient ischemic attack)  Assessment & Plan  Patient presented with intermittent right hand numbness, right leg numbness  Loaded with plavix prior to transfer  Patient's LDL was 48 and hemoglobin A1c was 7 2  TA likely related to significant carotid stenosis  CT brain: No acute intracranial abnormality  MRI brain: No acute infarction  CT H/N: Severe stenosis of the left ICA approximately 1 2 centimeter distal to the carotid bifurcation secondary to atheromatous plaque  Minimal luminal diameter of 1 millimeter corresponding to approximately 70% stenosis  Echo with bubble study: EF 55-60% without any regional wall motion abnormalities, mild concentric hypertrophy, grade 1 diastolic dysfunction without any left-to-right shunt  VAS Doppler: RIGHT: There is <50% stenosis noted in the internal carotid artery  Plaque is heterogenous and irregular  LEFT: There is 70-99% stenosis noted in the internal carotid artery  Plaque is heterogenous and irregular  Plan:  · Aspirin 81 mg p o  Daily  · Continue Plavix 75 milligram p o  Daily  · Underwent Neurology evaluation at [place]  Would benefit from f/u with outpatient Neuro   · Vascular consulted  See L carotid artery stenosis plan      * Left carotid artery stenosis  Assessment & Plan  CT H/N: Severe stenosis of the left ICA approximately 1 2 centimeter distal to the carotid bifurcation secondary to atheromatous block-70% stenosis as per report  VAS Doppler: RIGHT: There is <50% stenosis noted in the internal carotid artery  Plaque is heterogenous and irregular  LEFT: There is 70-99% stenosis noted in the internal carotid artery  Plaque is heterogenous and irregular  Vascular suspects left ICA stenosis closer to 90%  Plan:  · Vascular surgery consulted  · Left carotid endarterectomy on Monday March 22, 2021  · Continue plavix 75 mg daily         Coronary artery disease  Assessment & Plan  Status post PCI x1 in 2017  Patient complaining of some exertional shortness of breath on presentation  Serial cardiac enzymes were negative  Echo showed EF of 50 for 60% without any regional wall motion abnormalities, grade 1 diastolic dysfunction without any right left shin  Plan:  · Continue metoprolol 25 mg daily    Diabetes mellitus, type 2 [facility])  Assessment & Plan  Lab Results   Component Value Date    HGBA1C 7 2 (H) 03/19/2021       Recent Labs     03/19/21  2033 03/20/21  0730 03/20/21  1130 03/20/21  1634   POCGLU 174* 134 282* 124       Blood Sugar Average: Last 72 hrs:     · Metformin has been on hold  · Humalog sliding scale with Accu-Cheks q a c  And hs    Hypertensive urgency  Assessment & Plan  Patient's blood pressure initially in the ED was 226/96 and persistently elevated with systolic in 385U  BP controlled upon [facility]  Plan:  · Continue Toprol-XL 25 milligram p o  Daily and losartan 50 milligram p o  Daily  · A low permissive hypertension to keep systolic blood pressure more than 170 given his severe carotid stenosis  · Will order hydralazine p r n  For SBP more than 170    Hyperlipidemia  Assessment & Plan  LDL level is 45  Patient was on lipitor 40 mg daily at home  Plan:  · Lipitor 80 mg p o   Daily      Disposition: Endarterectomy today     SUBJECTIVE     Patient seen and examined  No acute events overnight  Patient is comfortably in bed  Denies hearing/vision changes, fevers, chills, night sweats, dysphagia, chest pain, shortness of breath, abdominal pain, nausea, vomiting, diarrhea, constipation, dysuria, and peripheral edema  Patient states he has no numbness or tingling since last week  Planning for endarterectomy with vascular surgery today  OBJECTIVE     Vitals:    21 2256 21 0630 21 0823 21 1041   BP: 146/75 142/75 142/74    BP Location: Left arm      Pulse: 65 65 72    Resp: 18 18     Temp: 98 8 °F (37 1 °C) 97 7 °F (36 5 °C)  (!) 97 °F (36 1 °C)   TempSrc: Oral   Tympanic   SpO2: 96% 96%     Weight:       Height:          Temperature:   Temp (24hrs), Av 3 °F (36 8 °C), Min:97 °F (36 1 °C), Max:99 5 °F (37 5 °C)    Temperature: (!) 97 °F (36 1 °C)  Intake & Output:  I/O        07 -  0700  07 -  0700  07 -  0700    P  O  0 480 0    Total Intake(mL/kg) 0 (0) 480 (6 3) 0 (0)    Urine (mL/kg/hr) 0      Total Output 0      Net 0 +480 0               Weights:   IBW: 70 7 kg    Body mass index is 24 93 kg/m²  Weight (last 2 days)     Date/Time   Weight    21 20:50:04   76 6 (168 8)            Physical Exam  Vitals signs and nursing note reviewed  Constitutional:       Appearance: Normal appearance  HENT:      Head: Normocephalic and atraumatic  Right Ear: External ear normal       Left Ear: External ear normal       Nose: Nose normal       Mouth/Throat:      Mouth: Mucous membranes are moist       Pharynx: Oropharynx is clear  Eyes:      Extraocular Movements: Extraocular movements intact  Pupils: Pupils are equal, round, and reactive to light  Neck:      Vascular: Carotid bruit present  Cardiovascular:      Rate and Rhythm: Normal rate and regular rhythm  Heart sounds: No murmur     Pulmonary:      Effort: Pulmonary effort is normal       Breath sounds: Normal breath sounds  Abdominal:      General: Abdomen is flat  Palpations: Abdomen is soft  Musculoskeletal: Normal range of motion  Right lower leg: No edema  Left lower leg: No edema  Skin:     General: Skin is warm and dry  Capillary Refill: Capillary refill takes less than 2 seconds  Neurological:      General: No focal deficit present  Mental Status: He is alert and oriented to person, place, and time  Psychiatric:         Mood and Affect: Mood normal          Behavior: Behavior normal        LABORATORY DATA     Labs: I have personally reviewed pertinent reports  Results from last 7 days   Lab Units 03/22/21  0552 03/21/21  0457 03/18/21  1401   WBC Thousand/uL 8 22 8 46 8 01   HEMOGLOBIN g/dL 14 4 14 6 14 5   HEMATOCRIT % 44 8 46 4 47 3   PLATELETS Thousands/uL 195 183 205   NEUTROS PCT %  --  51 54   MONOS PCT %  --  9 9      Results from last 7 days   Lab Units 03/22/21  0552 03/21/21  0457 03/19/21  0529 03/18/21  1401   POTASSIUM mmol/L 3 9 3 9 3 8 4 2   CHLORIDE mmol/L 111* 109* 105 103   CO2 mmol/L 24 23 26 28   BUN mg/dL 23 23 21 21   CREATININE mg/dL 1 25 1 20 1 25 1 31*   CALCIUM mg/dL 9 0 9 3 9 2 9 6   ALK PHOS U/L 59  --   --  64   ALT U/L 31  --   --  45   AST U/L 12  --   --  21     Results from last 7 days   Lab Units 03/22/21  0552 03/18/21  1401   MAGNESIUM mg/dL 2 4 2 1     Results from last 7 days   Lab Units 03/22/21  0552   PHOSPHORUS mg/dL 3 0              Results from last 7 days   Lab Units 03/18/21  2226 03/18/21  1926 03/18/21  1404   TROPONIN I ng/mL <0 02 <0 02 <0 02       IMAGING & DIAGNOSTIC TESTING     Radiology Results: I have personally reviewed pertinent reports  No results found  Other Diagnostic Testing: I have personally reviewed pertinent reports      ACTIVE MEDICATIONS     Current Facility-Administered Medications   Medication Dose Route Frequency    [MAR Hold] acetaminophen (TYLENOL) tablet 650 mg  650 mg Oral Q6H PRN    [MAR Hold] aspirin chewable tablet 81 mg  81 mg Oral Daily    [MAR Hold] atorvastatin (LIPITOR) tablet 80 mg  80 mg Oral QPM    bacitracin 50,000 Units in sodium chloride 0 9 % 1,000 mL irrigation bag   Irrigation Once    [MAR Hold] bimatoprost (LUMIGAN) 0 01 % ophthalmic solution 1 drop  1 drop Both Eyes HS    ceFAZolin (ANCEF) IVPB (premix in dextrose) 1,000 mg 50 mL  1,000 mg Intravenous Once    ceFAZolin (ANCEF) IVPB (premix in dextrose) 2,000 mg 50 mL  2,000 mg Intravenous On Call To OR    [MAR Hold] cholecalciferol (VITAMIN D3) tablet 1,000 Units  1,000 Units Oral Daily    [MAR Hold] clopidogrel (PLAVIX) tablet 75 mg  75 mg Oral Daily    [MAR Hold] enoxaparin (LOVENOX) subcutaneous injection 40 mg  40 mg Subcutaneous Daily    [MAR Hold] fluticasone (FLONASE) 50 mcg/act nasal spray 2 spray  2 spray Each Nare Daily    heparin (porcine) 2,000 Units, papaverine 60 mg in multi-electrolyte (PLASMALYTE-A/ISOLYTE-S PH 7 4) 500 mL irrigation   Irrigation Once    [MAR Hold] hydrALAZINE (APRESOLINE) injection 5 mg  5 mg Intravenous Q6H PRN    [MAR Hold] insulin lispro (HumaLOG) 100 units/mL subcutaneous injection 1-5 Units  1-5 Units Subcutaneous HS    [MAR Hold] insulin lispro (HumaLOG) 100 units/mL subcutaneous injection 1-6 Units  1-6 Units Subcutaneous TID AC    [MAR Hold] loratadine (CLARITIN) tablet 10 mg  10 mg Oral Daily    [MAR Hold] metoprolol succinate (TOPROL-XL) 24 hr tablet 25 mg  25 mg Oral Daily    [MAR Hold] ondansetron (ZOFRAN) injection 4 mg  4 mg Intravenous Q6H PRN    [MAR Hold] pantoprazole (PROTONIX) EC tablet 40 mg  40 mg Oral Early Morning    sodium chloride 0 9 % infusion  75 mL/hr Intravenous Continuous    [MAR Hold] tobramycin-dexamethasone (TOBRADEX) 0 3-0 1 % ophthalmic suspension 1 drop  1 drop Left Eye Q6H [address]       VTE Pharmacologic Prophylaxis: Reason for no pharmacologic prophylaxis Surgery today  VTE Mechanical Prophylaxis: sequential compression device    Portions of the record may have been created with voice recognition software  Occasional wrong word or "sound a like" substitutions may have occurred due to the inherent limitations of voice recognition software    Read the chart carefully and recognize, using context, where substitutions have occurred   ==  Obi Ortiz, [facility]  Internal Medicine Residency PGY-1
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
## 2021-03-22 NOTE — PROGRESS NOTES
Progress Note - Vascular Surgery   Nikos Mccord 72 y o  male MRN: [id number]  Unit/Bed#: [facility] 519-01 Encounter: [id number]    Assessment:  72 y o  M with multiple medical comorbidities including HTN, HLD, DMT2, CAD s/p PCI in 2017, Factor V Leiden, DVT (2013, not on [facility]), presented with symptomatic L ICA stenosis now s/p L CEA 3/22:    Plan:  - serial neurovascular exam   - keep A-line today  Keep systolic blood pressure between 100-160   - diet as tolerated  - pain management   - PT/OT  - PSA/Plavix/statin  - DVT prophylaxis  - other plans per primary  Subjective/Objective   Subjective:  Patient is doing well, denies any chest pain or shortness of breath  Does have some sore throat  States that his throat is dry  Denies any nausea or vomiting  Has some mild headache but denies any vision changes  Denies any new numbness or weakness  Unable to void currently, trying  Objective:     Blood pressure 148/64, pulse 88, temperature 97 9 °F (36 6 °C), resp  rate 14, height 5' 9" (1 753 m), weight 76 6 kg (168 lb 12 8 oz), SpO2 99 %  ,Body mass index is 24 93 kg/m²  Intake/Output Summary (Last 24 hours) at 3/22/2021 1800  Last data filed at 3/22/2021 1654  Gross per 24 hour   Intake 2551 25 ml   Output 300 ml   Net 2251 25 ml       Invasive Devices     Peripheral Intravenous Line            Peripheral IV 03/22/21 Right Forearm less than 1 day          Arterial Line            Arterial Line 03/22/21 Left Radial less than 1 day                Physical Exam:   General: AAOx3, NAD  HEENT: atraumatic, non-icteric sclera  Dressings clean dry and intact  No swelling around the incision site  Card: RRR, not tachycardic  Pulm:  Nasal cannula, not tachypnic  Abd: Soft, non-tender, non distended  LE: No pitting edema  Integumentary: no rashes  Neuro:  Cranial nerves 2-12 grossly intact  Bilateral upper and lower extremity motor sensory intact        Lab, Imaging and other studies:  I have personally reviewed pertinent lab results    , CBC:   Lab Results   Component Value Date    WBC 15 27 (H) 03/22/2021    HGB 13 9 03/22/2021    HCT 43 6 03/22/2021    MCV 91 03/22/2021     03/22/2021    MCH 29 0 03/22/2021    MCHC 31 9 03/22/2021    RDW 13 6 03/22/2021    MPV 12 5 03/22/2021    NRBC 0 03/22/2021   , CMP:   Lab Results   Component Value Date    SODIUM 141 03/22/2021    K 4 2 03/22/2021     (H) 03/22/2021    CO2 24 03/22/2021    BUN 21 03/22/2021    CREATININE 1 40 (H) 03/22/2021    CALCIUM 8 6 03/22/2021    AST 17 03/22/2021    ALT 34 03/22/2021    ALKPHOS 56 03/22/2021    EGFR 52 03/22/2021     VTE Pharmacologic Prophylaxis: Enoxaparin (Lovenox)  VTE Mechanical Prophylaxis: sequential compression device

## 2021-03-22 NOTE — ASSESSMENT & PLAN NOTE
71 y/o M w/ PMHx HTN, HLD, DMT2, CAD s/p PCI in 2017, Factor V Leiden, DVT (2013, not on [facility]), who presented with symptomatic L ICA stenosis (Transient Right-sided weakness)    3/18 CTA- Left-70%, Right- nl  3/19 MRI brain- neg    Plan:   To OR today for Left CEA  NPO/IVF  Continue ASA, Plavix, statin

## 2021-03-22 NOTE — OP NOTE
OPERATIVE REPORT  PATIENT NAME: Kian Banegas    :  1955  MRN: [id number]  Pt Location: BE OR ROOM 03    SURGERY DATE: 3/22/2021    Surgeon(s) and Role:     * Robbin Vasquez MD - Primary     * Nikole Spann MD - Assisting    Preop Diagnosis:  Left carotid artery stenosis [I65 22]    Post-Op Diagnosis Codes:     * Left carotid artery stenosis [I65 22]    Procedure(s) (LRB):  ENDARTERECTOMY ARTERY CAROTID (Left)    Specimen(s):  * No specimens in log *    Estimated Blood Loss:   300 mL    Drains:  * No LDAs found *    Anesthesia Type:   General    Operative Indications:  Symptomatic Left carotid artery stenosis [I65 22]    Operative Findings:  Calcified plaque extening distal to bifurcation approximately 4cm  Awoke for GA neuro intact     Complications:   None    Procedure and Technique:  Procedure and Technique:  After informed consent was obtained, the patient was brought to the operating room and placed in the supine position  Perioperative IV antibiotics were given  He was given anesthesia and endotracheally intubated  Ultrasound was used to examine the carotid artery  The patient was placed with the head turned laterally to the right and slightly extended, with a shoulder roll  He was then prepped and draped in the usual sterile fashion exposing the neck  A timeout was performed  A marking pen was used to kamar the incision overlying the carotid artery  A 15-blade was used to make the incision  Cautery was used to dissect through the soft tissue and platysma  The sternocleidomastoid muscle was identified, dissected free along its medial border, and retracted laterally  The facial vein was identified, ligated with 2-0 silk sutures, and transected  The internal jugular vein was then retracted laterally, exposing the carotid artery  The dissection was continued at the common carotid artery    It was freed circumferentially and encircled with a umbilical tape and secured with a Rumel tourniquet with a cut red rubber tubing  Next, the external carotid artery was identified, freed circumferentially, and encircled with a vessel loop  The superior thyroid artery was identified and gently temporarily occluded with a medium Weck clip, which was removed at case end  Lastly, the internal carotid artery was dissected free and encircled distally with a vessel loop  6000 units of IV heparin was given  After this was given time to circulate, the internal carotid artery was clamped followed by the common carotid followed by the external carotid artery  We waited two minutes, no changes noted on EEG/SMS, therefore an 11-blade was used to make a longitudinal arteriotomy in the common carotid artery and this was extended distally along the internal carotid artery with Butler scissors  Excellent back-bleeding was noted from the internal carotid artery, prior to clamping  Next, an endarterectomy spatula was used to create the endarterectomy plane  This was continued towards the internal carotid artery, the plaque distally for approximately 4cm distal to the bifurcation, requiring division of the digastric muscle  At the endpoint two interrupted 7 0 prolene sutures were placed  The vessel was noted to be very friable  Eversion technique was used to perform endarterectomy of the external carotid artery  A small tear in the external carotid occurred requiring repair with a 6 0 prolene  The plaque at the common carotid was transected with Butler scissors  The endarterectomy bed was flushed with heparinized saline and all loose debris was pulled free  All endpoints were checked and noted to be adherent without any flaps or debris  A Xenosure bovine pericardial patch was selected and was sewn in using 6-0 prolene suture distally and 5 0 prolene proximally  Prior to completion, the arteries were bled and back-bled, and the endarterectomy bed was flushed copiously with heparinized saline    The patch was completed and the vessel loop on the external carotid artery was loosened, followed by the common carotid artery  After a few seconds, the internal carotid artery was released and cerebral flow was restored  No changes were noted on EEG/SMS monitoring  At the thinned out friable distal internal carotid a pledgeted suture was required  Two points along the lateral aspect of the patch closure required repair sutures  Next, intraoperative duplex ultrasound was performed, which showed widely patent common and internal carotid arteries with appropriate waveforms throughout the carotid system  The wound was then copiously irrigated with antibiotic saline  It was then checked for hemostasis  Gelfoam and thrombin was applied to the patch and Fibular was applied to wound edges  The patient was reversed with 40mg protamine  The platysma was then closed with 3-0 monocryl running suture and the skin was closed with 4-0 monocryl running subcuticular layer  The incision was dressed with Exofin  Mepilex was placed  20 cc's of 1% lidocaine equally mixed with 0 5% Marcaine with epinephrine was injected to the subcutaneous tissue    The patient was allowed to awaken and was extubated  He was moving all four extremities and following commands  He was then transferred to the PACU for postoperative care      Vascular Quality Initiative - Carotid Endarterectomy     Urgency:  Urgent Symptomatic:  yes    Anesthesia: General Type: Conventional    Side: left    Patch Type: Bovine Pericardial     If Prosthetic, Patch : Sharan    Shunt: No    Skin Prep: Chlorhexidine    Drain: no  Heparin: yes    Protamine: yes      Dextran: no     Re-explore artery after closure: no    Total Procedure time: 216min    Monitoring:   EEG: yes   Stump Pressure: no   Other: no    Completion Study:   Doppler: yes   Duplex: yes   Arteriogram: no    Concomitant Procedure:   Proximal Endovascular: no   Distal Endovascular: no   CABG: no   Other Arterial Op: no      SIGNATURE: Romel Weston MD  DATE: March 22, 2021  TIME: 4:00 PM         I was present for the entire procedure    Patient Disposition:  PACU     SIGNATURE: Romel Weston MD  DATE: March 22, 2021  TIME: 3:58 PM

## 2021-03-22 NOTE — QUICK NOTE
Patient's spouse, Brooklynn Matta, was called and updated  All questions answered      Lulu Shultz DO, Luite Tee 87  PGY-1, Internal Medicine  [facility]

## 2021-03-23 ENCOUNTER — APPOINTMENT (INPATIENT)
Dept: RADIOLOGY | Facility: HOSPITAL | Age: 66
DRG: 038 | End: 2021-03-23
Payer: COMMERCIAL

## 2021-03-23 LAB
ANION GAP SERPL CALCULATED.3IONS-SCNC: 6 MMOL/L (ref 4–13)
APTT PPP: 26 SECONDS (ref 23–37)
BASOPHILS # BLD AUTO: 0.02 THOUSANDS/ΜL (ref 0–0.1)
BASOPHILS NFR BLD AUTO: 0 % (ref 0–1)
BUN SERPL-MCNC: 19 MG/DL (ref 5–25)
CALCIUM SERPL-MCNC: 8.5 MG/DL (ref 8.3–10.1)
CHLORIDE SERPL-SCNC: 111 MMOL/L (ref 100–108)
CO2 SERPL-SCNC: 22 MMOL/L (ref 21–32)
CREAT SERPL-MCNC: 1.21 MG/DL (ref 0.6–1.3)
EOSINOPHIL # BLD AUTO: 0 THOUSAND/ΜL (ref 0–0.61)
EOSINOPHIL NFR BLD AUTO: 0 % (ref 0–6)
ERYTHROCYTE [DISTWIDTH] IN BLOOD BY AUTOMATED COUNT: 14.1 % (ref 11.6–15.1)
GFR SERPL CREATININE-BSD FRML MDRD: 62 ML/MIN/1.73SQ M
GLUCOSE SERPL-MCNC: 180 MG/DL (ref 65–140)
GLUCOSE SERPL-MCNC: 180 MG/DL (ref 65–140)
GLUCOSE SERPL-MCNC: 188 MG/DL (ref 65–140)
GLUCOSE SERPL-MCNC: 215 MG/DL (ref 65–140)
GLUCOSE SERPL-MCNC: 215 MG/DL (ref 65–140)
HCT VFR BLD AUTO: 40.8 % (ref 36.5–49.3)
HGB BLD-MCNC: 13.1 G/DL (ref 12–17)
IMM GRANULOCYTES # BLD AUTO: 0.08 THOUSAND/UL (ref 0–0.2)
IMM GRANULOCYTES NFR BLD AUTO: 1 % (ref 0–2)
INR PPP: 1.05 (ref 0.84–1.19)
LYMPHOCYTES # BLD AUTO: 1.37 THOUSANDS/ΜL (ref 0.6–4.47)
LYMPHOCYTES NFR BLD AUTO: 11 % (ref 14–44)
MCH RBC QN AUTO: 28.9 PG (ref 26.8–34.3)
MCHC RBC AUTO-ENTMCNC: 32.1 G/DL (ref 31.4–37.4)
MCV RBC AUTO: 90 FL (ref 82–98)
MONOCYTES # BLD AUTO: 1.41 THOUSAND/ΜL (ref 0.17–1.22)
MONOCYTES NFR BLD AUTO: 11 % (ref 4–12)
NEUTROPHILS # BLD AUTO: 9.7 THOUSANDS/ΜL (ref 1.85–7.62)
NEUTS SEG NFR BLD AUTO: 77 % (ref 43–75)
NRBC BLD AUTO-RTO: 0 /100 WBCS
PLATELET # BLD AUTO: 200 THOUSANDS/UL (ref 149–390)
PMV BLD AUTO: 12.5 FL (ref 8.9–12.7)
POTASSIUM SERPL-SCNC: 3.7 MMOL/L (ref 3.5–5.3)
PROTHROMBIN TIME: 13.8 SECONDS (ref 11.6–14.5)
RBC # BLD AUTO: 4.53 MILLION/UL (ref 3.88–5.62)
SODIUM SERPL-SCNC: 139 MMOL/L (ref 136–145)
WBC # BLD AUTO: 12.58 THOUSAND/UL (ref 4.31–10.16)

## 2021-03-23 PROCEDURE — 99232 SBSQ HOSP IP/OBS MODERATE 35: CPT | Performed by: INTERNAL MEDICINE

## 2021-03-23 PROCEDURE — 82948 REAGENT STRIP/BLOOD GLUCOSE: CPT

## 2021-03-23 PROCEDURE — 74230 X-RAY XM SWLNG FUNCJ C+: CPT

## 2021-03-23 PROCEDURE — 92610 EVALUATE SWALLOWING FUNCTION: CPT

## 2021-03-23 PROCEDURE — 99024 POSTOP FOLLOW-UP VISIT: CPT | Performed by: SURGERY

## 2021-03-23 PROCEDURE — 85610 PROTHROMBIN TIME: CPT | Performed by: SURGERY

## 2021-03-23 PROCEDURE — 80048 BASIC METABOLIC PNL TOTAL CA: CPT | Performed by: SURGERY

## 2021-03-23 PROCEDURE — 85730 THROMBOPLASTIN TIME PARTIAL: CPT | Performed by: SURGERY

## 2021-03-23 PROCEDURE — 92611 MOTION FLUOROSCOPY/SWALLOW: CPT

## 2021-03-23 PROCEDURE — 85025 COMPLETE CBC W/AUTO DIFF WBC: CPT | Performed by: SURGERY

## 2021-03-23 RX ORDER — LOSARTAN POTASSIUM 25 MG/1
25 TABLET ORAL DAILY
Status: DISCONTINUED | OUTPATIENT
Start: 2021-03-23 | End: 2021-03-24

## 2021-03-23 RX ADMIN — Medication 1000 UNITS: at 08:04

## 2021-03-23 RX ADMIN — HEPARIN SODIUM 5000 UNITS: 5000 INJECTION INTRAVENOUS; SUBCUTANEOUS at 21:19

## 2021-03-23 RX ADMIN — LORATADINE 10 MG: 10 TABLET ORAL at 08:03

## 2021-03-23 RX ADMIN — TOBRAMYCIN AND DEXAMETHASONE 1 DROP: 3; 1 SUSPENSION/ DROPS OPHTHALMIC at 23:05

## 2021-03-23 RX ADMIN — CLOPIDOGREL BISULFATE 75 MG: 75 TABLET, FILM COATED ORAL at 08:03

## 2021-03-23 RX ADMIN — SODIUM CHLORIDE 10 MG/HR: 0.9 INJECTION, SOLUTION INTRAVENOUS at 21:20

## 2021-03-23 RX ADMIN — ASPIRIN 81 MG: 81 TABLET, CHEWABLE ORAL at 08:13

## 2021-03-23 RX ADMIN — CEFAZOLIN SODIUM 2000 MG: 2 SOLUTION INTRAVENOUS at 01:09

## 2021-03-23 RX ADMIN — PANTOPRAZOLE SODIUM 40 MG: 40 TABLET, DELAYED RELEASE ORAL at 05:39

## 2021-03-23 RX ADMIN — ACETAMINOPHEN 650 MG: 325 TABLET, FILM COATED ORAL at 13:50

## 2021-03-23 RX ADMIN — TOBRAMYCIN AND DEXAMETHASONE 1 DROP: 3; 1 SUSPENSION/ DROPS OPHTHALMIC at 05:40

## 2021-03-23 RX ADMIN — ENOXAPARIN SODIUM 40 MG: 40 INJECTION SUBCUTANEOUS at 08:04

## 2021-03-23 RX ADMIN — SODIUM CHLORIDE 10 MG/HR: 0.9 INJECTION, SOLUTION INTRAVENOUS at 18:35

## 2021-03-23 RX ADMIN — SODIUM CHLORIDE 10 MG/HR: 0.9 INJECTION, SOLUTION INTRAVENOUS at 15:16

## 2021-03-23 RX ADMIN — SODIUM CHLORIDE 10 MG/HR: 0.9 INJECTION, SOLUTION INTRAVENOUS at 04:45

## 2021-03-23 RX ADMIN — OXYCODONE HYDROCHLORIDE 5 MG: 5 TABLET ORAL at 07:22

## 2021-03-23 RX ADMIN — SODIUM CHLORIDE 10 MG/HR: 0.9 INJECTION, SOLUTION INTRAVENOUS at 08:02

## 2021-03-23 RX ADMIN — METOPROLOL SUCCINATE 25 MG: 25 TABLET, FILM COATED, EXTENDED RELEASE ORAL at 08:02

## 2021-03-23 RX ADMIN — INSULIN LISPRO 2 UNITS: 100 INJECTION, SOLUTION INTRAVENOUS; SUBCUTANEOUS at 17:03

## 2021-03-23 RX ADMIN — LOSARTAN POTASSIUM 25 MG: 25 TABLET, FILM COATED ORAL at 13:29

## 2021-03-23 RX ADMIN — SODIUM CHLORIDE 10 MG/HR: 0.9 INJECTION, SOLUTION INTRAVENOUS at 13:25

## 2021-03-23 RX ADMIN — TOBRAMYCIN AND DEXAMETHASONE 1 DROP: 3; 1 SUSPENSION/ DROPS OPHTHALMIC at 17:04

## 2021-03-23 RX ADMIN — INSULIN LISPRO 1 UNITS: 100 INJECTION, SOLUTION INTRAVENOUS; SUBCUTANEOUS at 21:19

## 2021-03-23 RX ADMIN — ATORVASTATIN CALCIUM 80 MG: 80 TABLET, FILM COATED ORAL at 17:03

## 2021-03-23 RX ADMIN — TOBRAMYCIN AND DEXAMETHASONE 1 DROP: 3; 1 SUSPENSION/ DROPS OPHTHALMIC at 13:30

## 2021-03-23 RX ADMIN — HYDROMORPHONE HYDROCHLORIDE 0.2 MG: 1 INJECTION, SOLUTION INTRAMUSCULAR; INTRAVENOUS; SUBCUTANEOUS at 01:02

## 2021-03-23 RX ADMIN — SODIUM CHLORIDE 10 MG/HR: 0.9 INJECTION, SOLUTION INTRAVENOUS at 02:17

## 2021-03-23 RX ADMIN — BIMATOPROST 1 DROP: 0.1 SOLUTION/ DROPS OPHTHALMIC at 21:20

## 2021-03-23 RX ADMIN — INSULIN LISPRO 1 UNITS: 100 INJECTION, SOLUTION INTRAVENOUS; SUBCUTANEOUS at 07:59

## 2021-03-23 RX ADMIN — OXYCODONE HYDROCHLORIDE 5 MG: 5 TABLET ORAL at 18:36

## 2021-03-23 NOTE — QUICK NOTE
The patient's wife, Roxy Camp, was called for update but there was no answer  A message was left  Will try again tomorrow       Kurt Hester DO, Alvino Ramírez 87  PGY-1, Internal Medicine  [facility]
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
## 2021-03-23 NOTE — PROGRESS NOTES
Progress Note - Vascular Surgery   Priyanka Mandujano 72 y o  male MRN: [id number]  Unit/Bed#: [facility] 519-01 Encounter: [id number]    Assessment:  72 y o  M with multiple medical comorbidities including HTN, HLD, DMT2, CAD s/p PCI in 2017, Factor V Leiden, DVT (2013, not on [facility]), presented with symptomatic L ICA stenosis now s/p L CEA 3/22:    Plan:  - serial neurovascular exam  - continue nicardipene drip for HTN control  - keep A-line today until weaned off nicardipine  Keep systolic blood pressure between 120-160   - diet as tolerated  - pain management   - PT/OT  - ASA/Plavix/statin  - DVT prophylaxis  - other plans per primary  Subjective/Objective   Subjective:  Patient is doing well, denies any chest pain or shortness of breath  States that his throat is dry  Denies any nausea or vomiting  Denies any new numbness or weakness  Unable to void and is s/p straight cath x 2 o/n    Objective:     Blood pressure 140/63, pulse 99, temperature 98 4 °F (36 9 °C), temperature source Oral, resp  rate 18, height 5' 9" (1 753 m), weight 76 6 kg (168 lb 12 8 oz), SpO2 94 %  ,Body mass index is 24 93 kg/m²        Intake/Output Summary (Last 24 hours) at 3/23/2021 0736  Last data filed at 3/23/2021 6642  Gross per 24 hour   Intake 4471 67 ml   Output 3830 ml   Net 641 67 ml       Invasive Devices     Peripheral Intravenous Line            Peripheral IV 03/22/21 Right Forearm 1 day    Peripheral IV 03/22/21 Right Forearm less than 1 day          Arterial Line            Arterial Line 03/22/21 Left Radial less than 1 day              Physical Exam:   GENERAL APPEARANCE: in no acute distress  NEURO: stable, normal motor and sensory exam  HEENT: normocephalic, atraumatic, neck incision healthy  CV: regular rate and rhythm, no tachycardia,   LUNGS: clear to auscultation bilaterally  GI: soft, nontender, nondistended  : no abnormality detected  MSK: no abnormality detected   SKIN: no abnormality detected        Lab, Imaging and other studies:  I have personally reviewed pertinent lab results    , CBC:   Lab Results   Component Value Date    WBC 12 58 (H) 03/23/2021    HGB 13 1 03/23/2021    HCT 40 8 03/23/2021    MCV 90 03/23/2021     03/23/2021    MCH 28 9 03/23/2021    MCHC 32 1 03/23/2021    RDW 14 1 03/23/2021    MPV 12 5 03/23/2021    NRBC 0 03/23/2021   , CMP:   Lab Results   Component Value Date    SODIUM 139 03/23/2021    K 3 7 03/23/2021     (H) 03/23/2021    CO2 22 03/23/2021    BUN 19 03/23/2021    CREATININE 1 21 03/23/2021    CALCIUM 8 5 03/23/2021    AST 17 03/22/2021    ALT 34 03/22/2021    ALKPHOS 56 03/22/2021    EGFR 62 03/23/2021     VTE Pharmacologic Prophylaxis: Enoxaparin (Lovenox)  VTE Mechanical Prophylaxis: sequential compression device no

## 2021-03-23 NOTE — CASE MANAGEMENT
LOS:  2 days  Bundle: no  Readmission: no    Explained role of CM to pt  CM obtained the following information from pt  Home: lives in a ranch style home with his wife  [address]  DME: CPAP  Ambulation: independent  Drives: yes  Pharmacy: [facility] and uses a Mail service pharmacy from his work  PCP:  [address] History: nanette, denglen inpatient psychiatric stay ever  Substance Use/Abuse History: denies  [facility] History: denies  Work/Retired:  employed  Rehab History: denies  POA/LW: No and pt declined information on POA/ LW  [facility] [employer]: Wife will transport   CM reviewed d/c planning process including the following: identifying help at home, patient preference for d/c planning needs, [facility] to [facility] program      Cm asked pt if he was unable to make decisions for himself who would he want to make decisions for him,   He said his wife Helen Leggett  Cm asked pt if he would like CM to call anyone for him he declined

## 2021-03-23 NOTE — UTILIZATION REVIEW
Notification of Discharge  This is a Notification of Discharge from our facility [address]  Please be advised that this patient has been discharge from our facility  Below you will find the admission and discharge date and time including the patients disposition  PRESENTATION DATE: 3/18/2021  1:26 PM  OBS ADMISSION DATE:   IP ADMISSION DATE: 3/19/21 1554   DISCHARGE DATE: 3/20/2021  8:00 PM  DISPOSITION: [address]   Admission Orders listed below:  Admission Orders (From admission, onward)     Ordered        03/19/21 1554  Inpatient Admission  Once         03/18/21 1608  Place in Observation  Once                   Please contact the UR Department if additional information is required to close this patient's authorization/case  [employer]  Network Utilization Review Department  Main: [phone] x carefully listen to the prompts  All voicemails are confidential   [email or web address]  Send all requests for admission clinical reviews, approved or denied determinations and any other requests to dedicated fax number below belonging to the campus where the patient is receiving treatment   List of dedicated fax numbers:  [address] DENIALS (Administrative/Medical Necessity) [phone]   [address] (Maternity/NICU/Pediatrics) [phone]   [place] [phone]   [facility] [phone]   Gloria Loredo [phone]   [address] [phone]   [facility]  [phone]   [address] [place] [address], S [address]  [address] [facility] [address] [facility] [phone]

## 2021-03-23 NOTE — PLAN OF CARE
Problem: NEUROSENSORY - ADULT  Goal: Achieves stable or improved neurological status  Description: INTERVENTIONS  - Monitor and report changes in neurological status  - Monitor vital signs such as temperature, blood pressure, glucose, and any other labs ordered     Problem: DISCHARGE PLANNING  Goal: Discharge to home or other facility with appropriate resources  Description: INTERVENTIONS:  - Identify barriers to discharge w/patient and caregiver  - Arrange for needed discharge resources and transportation as appropriate  - Identify discharge learning needs (meds, wound care, etc )  - Arrange for interpretive services to assist at discharge as needed  - Refer to Case Management Department for coordinating discharge planning if the patient needs post-hospital services based on physician/advanced practitioner order or complex needs related to functional status, cognitive ability, or social support system  Outcome: Progressing     Problem: Knowledge Deficit  Goal: Patient/family/caregiver demonstrates understanding of disease process, treatment plan, medications, and discharge instructions  Description: Complete learning assessment and assess knowledge base    Interventions:  - Provide teaching at level of understanding  - Provide teaching via preferred learning methods  Outcome: Progressing       Outcome: Progressing

## 2021-03-23 NOTE — PROGRESS NOTES
INTERNAL MEDICINE RESIDENCY PROGRESS NOTE     Name: Obed Key   Age & Sex: 72 y o  male   MRN: [id number]  Unit/Bed#: [facility] 519-01   Encounter: [id number]  Team: SOD Team C     PATIENT INFORMATION     Name: Obed Key   Age & Sex: 72 y o  male   MRN: [id number]  Hospital Stay Days: 3    ASSESSMENT/PLAN     Principal Problem:    Left carotid artery stenosis  Active Problems:    TIA (transient ischemic attack)    Coronary artery disease    Diabetes mellitus, type 2 ([facility] )    Hypertensive urgency    Hyperlipidemia    Emanuel's esophagus    Sleep apnea    CPAP (continuous positive airway pressure) dependence    H/O blood clots    Fatty liver    GERD (gastroesophageal reflux disease)    Hypercoagulable state ([facility] )      TIA (transient ischemic attack)  Assessment & Plan  Patient presented with intermittent right hand numbness, right leg numbness  Loaded with plavix prior to transfer  Patient's LDL was 48 and hemoglobin A1c was 7 2  TA likely related to significant carotid stenosis  CT brain: No acute intracranial abnormality  MRI brain: No acute infarction  CT H/N: Severe stenosis of the left ICA approximately 1 2 centimeter distal to the carotid bifurcation secondary to atheromatous plaque  Minimal luminal diameter of 1 millimeter corresponding to approximately 70% stenosis  Echo with bubble study: EF 55-60% without any regional wall motion abnormalities, mild concentric hypertrophy, grade 1 diastolic dysfunction without any left-to-right shunt  VAS Doppler: RIGHT: There is <50% stenosis noted in the internal carotid artery  Plaque is heterogenous and irregular  LEFT: There is 70-99% stenosis noted in the internal carotid artery  Plaque is heterogenous and irregular  Plan:  · Aspirin 81 mg p o  Daily  · Continue Plavix 75 mg p o  Daily  · Underwent Neurology evaluation at [facility]  Would benefit from f/u with outpatient Neuro   · Vascular consulted  See L carotid artery stenosis plan      * Left carotid artery stenosis  Assessment & Plan  CT H/N: Severe stenosis of the left ICA approximately 1 2 centimeter distal to the carotid bifurcation secondary to atheromatous block-70% stenosis as per report  VAS Doppler: RIGHT: There is <50% stenosis noted in the internal carotid artery  Plaque is heterogenous and irregular  LEFT: There is 70-99% stenosis noted in the internal carotid artery  Plaque is heterogenous and irregular  Vascular suspects left ICA stenosis closer to 90%  Plan:  · Vascular surgery consulted  · S/p Left carotid endarterectomy 3/22/21  · Continue plavix 75 mg daily, ASA 81 mg daily, and atorvastatin 80 mg QD  · Cardene gtt for goal -160 per vascular         Coronary artery disease  Assessment & Plan  Status post PCI x1 in 2017  Patient complaining of some exertional shortness of breath on presentation  Serial cardiac enzymes were negative  Echo showed EF of 50 for 60% without any regional wall motion abnormalities, grade 1 diastolic dysfunction without any right left shin  Plan:  · Continue metoprolol 25 mg daily    Diabetes mellitus, type 2 [facility])  Assessment & Plan  Lab Results   Component Value Date    HGBA1C 7 2 (H) 03/19/2021       Recent Labs     03/19/21  2033 03/20/21  0730 03/20/21  1130 03/20/21  1634   POCGLU 174* 134 282* 124       Blood Sugar Average: Last 72 hrs:     · Metformin has been on hold  · Humalog sliding scale with Accu-Cheks q a c  And hs    Hypertensive urgency  Assessment & Plan  Patient's blood pressure initially in the ED was 226/96 and persistently elevated with systolic in 017U  BP controlled upon transfer to [facility]  Plan:  · Continue Toprol-XL 25 milligram p o  Daily  · Cardene gtt for goal -160 per vascular  · Will order hydralazine p r n  For SBP more than 170    Hyperlipidemia  Assessment & Plan  LDL level is 45  Patient was on lipitor 40 mg daily at home  Plan:  · Lipitor 80 mg p o   Daily      Disposition: Continue inpatient care SUBJECTIVE     Patient seen and examined  No acute events overnight  Patient resting comfortably in bed  Patient is now status post endarterectomy  Denies hearing/vision changes, fevers, chills, night sweats, dysphagia, chest pain, shortness of breath, abdominal pain, nausea, vomiting, diarrhea, constipation, dysuria, and peripheral edema  Patient states that he feels well after procedure  Continues on the nicardipine drip for goal blood pressures between 120 and 160  OBJECTIVE     Vitals:    21 0345 21 0708 21 0740 21 0802   BP:  140/63  162/71   BP Location:       Pulse:  99  (!) 121   Resp:  18     Temp:  98 4 °F (36 9 °C)     TempSrc:  Oral     SpO2: 99% 94% 97%    Weight:       Height:          Temperature:   Temp (24hrs), Av °F (36 7 °C), Min:97 °F (36 1 °C), Max:98 6 °F (37 °C)    Temperature: 98 4 °F (36 9 °C)  Intake & Output:  I/O        07 -  0700  07 -  0700  07 -  0700    P  O  480 120 360    I V  (mL/kg)  4031 7 (52 6) 270 (3 5)    IV Piggyback  50     Total Intake(mL/kg) 480 (6 3) 4201 7 (54 9) 630 (8 2)    Urine (mL/kg/hr)  3355 (1 8) 450 (2 9)    Blood  300     Total Output  3655 450    Net +480 +546 7 +180               Weights:   IBW: 70 7 kg    Body mass index is 24 93 kg/m²  Weight (last 2 days)     None        Physical Exam  Vitals signs and nursing note reviewed  Constitutional:       Appearance: Normal appearance  HENT:      Head: Normocephalic and atraumatic  Right Ear: External ear normal       Left Ear: External ear normal       Nose: Nose normal       Mouth/Throat:      Mouth: Mucous membranes are moist       Pharynx: Oropharynx is clear  Eyes:      Extraocular Movements: Extraocular movements intact  Pupils: Pupils are equal, round, and reactive to light  Neck:      Comments: Status post endarterectomy on the left  Cardiovascular:      Rate and Rhythm: Normal rate and regular rhythm  Pulses: Normal pulses  Heart sounds: No murmur  Pulmonary:      Effort: Pulmonary effort is normal       Breath sounds: Normal breath sounds  Abdominal:      General: Abdomen is flat  There is no distension  Palpations: Abdomen is soft  Tenderness: There is no abdominal tenderness  Musculoskeletal: Normal range of motion  Skin:     General: Skin is warm and dry  Capillary Refill: Capillary refill takes less than 2 seconds  Neurological:      General: No focal deficit present  Mental Status: He is alert and oriented to person, place, and time  Psychiatric:         Mood and Affect: Mood normal          Behavior: Behavior normal        LABORATORY DATA     Labs: I have personally reviewed pertinent reports  Results from last 7 days   Lab Units 03/23/21  0530 03/22/21  1612 03/22/21  0552 03/21/21  0457   WBC Thousand/uL 12 58* 15 27* 8 22 8 46   HEMOGLOBIN g/dL 13 1 13 9 14 4 14 6   HEMATOCRIT % 40 8 43 6 44 8 46 4   PLATELETS Thousands/uL 200 210 195 183   NEUTROS PCT % 77* 85*  --  51   MONOS PCT % 11 2*  --  9      Results from last 7 days   Lab Units 03/23/21  0530 03/22/21  1612 03/22/21  0552  03/18/21  1401   POTASSIUM mmol/L 3 7 4 2 3 9   < > 4 2   CHLORIDE mmol/L 111* 110* 111*   < > 103   CO2 mmol/L 22 24 24   < > 28   BUN mg/dL 19 21 23   < > 21   CREATININE mg/dL 1 21 1 40* 1 25   < > 1 31*   CALCIUM mg/dL 8 5 8 6 9 0   < > 9 6   ALK PHOS U/L  --  56 59  --  64   ALT U/L  --  34 31  --  45   AST U/L  --  17 12  --  21    < > = values in this interval not displayed       Results from last 7 days   Lab Units 03/22/21  0552 03/18/21  1401   MAGNESIUM mg/dL 2 4 2 1     Results from last 7 days   Lab Units 03/22/21  0552   PHOSPHORUS mg/dL 3 0      Results from last 7 days   Lab Units 03/23/21  0530 03/22/21  1612   INR  1 05 1 02   PTT seconds 26 28         Results from last 7 days   Lab Units 03/18/21  2226 03/18/21  1926 03/18/21  1404   TROPONIN I ng/mL <0 02 <0 02 <0 02 IMAGING & DIAGNOSTIC TESTING     Radiology Results: I have personally reviewed pertinent reports  No results found  Other Diagnostic Testing: I have personally reviewed pertinent reports      ACTIVE MEDICATIONS     Current Facility-Administered Medications   Medication Dose Route Frequency    acetaminophen (TYLENOL) tablet 650 mg  650 mg Oral Q6H PRN    aspirin chewable tablet 81 mg  81 mg Oral QAM    atorvastatin (LIPITOR) tablet 80 mg  80 mg Oral QPM    bimatoprost (LUMIGAN) 0 01 % ophthalmic solution 1 drop  1 drop Both Eyes HS    bimatoprost (LUMIGAN) 0 01 % ophthalmic solution 1 drop  1 drop Both Eyes HS    cholecalciferol (VITAMIN D3) tablet 1,000 Units  1,000 Units Oral Daily    clopidogrel (PLAVIX) tablet 75 mg  75 mg Oral Daily    fluticasone (FLONASE) 50 mcg/act nasal spray 2 spray  2 spray Each Nare Daily    heparin (porcine) subcutaneous injection 5,000 Units  5,000 Units Subcutaneous Q12H [address]    hydrALAZINE (APRESOLINE) injection 15 mg  15 mg Intravenous Q15 Min PRN    And    niCARdipine (CARDENE) 25 mg (STANDARD CONCENTRATION) in sodium chloride 0 9% 250 mL  1-15 mg/hr Intravenous Continuous PRN    hydrALAZINE (APRESOLINE) injection 5 mg  5 mg Intravenous Q6H PRN    insulin lispro (HumaLOG) 100 units/mL subcutaneous injection 1-5 Units  1-5 Units Subcutaneous HS    insulin lispro (HumaLOG) 100 units/mL subcutaneous injection 1-6 Units  1-6 Units Subcutaneous TID AC    loratadine (CLARITIN) tablet 10 mg  10 mg Oral Daily    metoprolol succinate (TOPROL-XL) 24 hr tablet 25 mg  25 mg Oral Daily    ondansetron (ZOFRAN) injection 4 mg  4 mg Intravenous Q6H PRN    oxyCODONE (ROXICODONE) IR tablet 5 mg  5 mg Oral Q4H PRN    pantoprazole (PROTONIX) EC tablet 40 mg  40 mg Oral Early Morning    tobramycin-dexamethasone (TOBRADEX) 0 3-0 1 % ophthalmic suspension 1 drop  1 drop Left Eye Q6H [address]       VTE Pharmacologic Prophylaxis: Heparin  VTE Mechanical Prophylaxis: sequential compression device    Portions of the record may have been created with voice recognition software  Occasional wrong word or "sound a like" substitutions may have occurred due to the inherent limitations of voice recognition software    Read the chart carefully and recognize, using context, where substitutions have occurred   ==  Dave Moon, [address] [facility]  Internal Medicine Residency PGY-1
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
## 2021-03-24 ENCOUNTER — DOCUMENTATION (OUTPATIENT)
Dept: VASCULAR SURGERY | Facility: CLINIC | Age: 66
End: 2021-03-24

## 2021-03-24 LAB
ANION GAP SERPL CALCULATED.3IONS-SCNC: 5 MMOL/L (ref 4–13)
BASOPHILS # BLD AUTO: 0.06 THOUSANDS/ΜL (ref 0–0.1)
BASOPHILS NFR BLD AUTO: 1 % (ref 0–1)
BUN SERPL-MCNC: 13 MG/DL (ref 5–25)
CALCIUM SERPL-MCNC: 8.5 MG/DL (ref 8.3–10.1)
CHLORIDE SERPL-SCNC: 115 MMOL/L (ref 100–108)
CO2 SERPL-SCNC: 22 MMOL/L (ref 21–32)
CREAT SERPL-MCNC: 0.86 MG/DL (ref 0.6–1.3)
EOSINOPHIL # BLD AUTO: 0.08 THOUSAND/ΜL (ref 0–0.61)
EOSINOPHIL NFR BLD AUTO: 1 % (ref 0–6)
ERYTHROCYTE [DISTWIDTH] IN BLOOD BY AUTOMATED COUNT: 14.3 % (ref 11.6–15.1)
GFR SERPL CREATININE-BSD FRML MDRD: 91 ML/MIN/1.73SQ M
GLUCOSE SERPL-MCNC: 163 MG/DL (ref 65–140)
GLUCOSE SERPL-MCNC: 167 MG/DL (ref 65–140)
GLUCOSE SERPL-MCNC: 190 MG/DL (ref 65–140)
GLUCOSE SERPL-MCNC: 217 MG/DL (ref 65–140)
GLUCOSE SERPL-MCNC: 239 MG/DL (ref 65–140)
HCT VFR BLD AUTO: 39.7 % (ref 36.5–49.3)
HGB BLD-MCNC: 12.6 G/DL (ref 12–17)
IMM GRANULOCYTES # BLD AUTO: 0.04 THOUSAND/UL (ref 0–0.2)
IMM GRANULOCYTES NFR BLD AUTO: 0 % (ref 0–2)
LYMPHOCYTES # BLD AUTO: 1.72 THOUSANDS/ΜL (ref 0.6–4.47)
LYMPHOCYTES NFR BLD AUTO: 15 % (ref 14–44)
MCH RBC QN AUTO: 29 PG (ref 26.8–34.3)
MCHC RBC AUTO-ENTMCNC: 31.7 G/DL (ref 31.4–37.4)
MCV RBC AUTO: 91 FL (ref 82–98)
MONOCYTES # BLD AUTO: 1.19 THOUSAND/ΜL (ref 0.17–1.22)
MONOCYTES NFR BLD AUTO: 10 % (ref 4–12)
NEUTROPHILS # BLD AUTO: 8.34 THOUSANDS/ΜL (ref 1.85–7.62)
NEUTS SEG NFR BLD AUTO: 73 % (ref 43–75)
NRBC BLD AUTO-RTO: 0 /100 WBCS
PLATELET # BLD AUTO: 197 THOUSANDS/UL (ref 149–390)
PMV BLD AUTO: 13 FL (ref 8.9–12.7)
POTASSIUM SERPL-SCNC: 3.5 MMOL/L (ref 3.5–5.3)
RBC # BLD AUTO: 4.35 MILLION/UL (ref 3.88–5.62)
SODIUM SERPL-SCNC: 142 MMOL/L (ref 136–145)
WBC # BLD AUTO: 11.43 THOUSAND/UL (ref 4.31–10.16)

## 2021-03-24 PROCEDURE — 99233 SBSQ HOSP IP/OBS HIGH 50: CPT | Performed by: INTERNAL MEDICINE

## 2021-03-24 PROCEDURE — 97162 PT EVAL MOD COMPLEX 30 MIN: CPT

## 2021-03-24 PROCEDURE — 99024 POSTOP FOLLOW-UP VISIT: CPT | Performed by: PHYSICIAN ASSISTANT

## 2021-03-24 PROCEDURE — 92526 ORAL FUNCTION THERAPY: CPT

## 2021-03-24 PROCEDURE — 85025 COMPLETE CBC W/AUTO DIFF WBC: CPT | Performed by: STUDENT IN AN ORGANIZED HEALTH CARE EDUCATION/TRAINING PROGRAM

## 2021-03-24 PROCEDURE — 80048 BASIC METABOLIC PNL TOTAL CA: CPT | Performed by: STUDENT IN AN ORGANIZED HEALTH CARE EDUCATION/TRAINING PROGRAM

## 2021-03-24 PROCEDURE — 82948 REAGENT STRIP/BLOOD GLUCOSE: CPT

## 2021-03-24 PROCEDURE — 97166 OT EVAL MOD COMPLEX 45 MIN: CPT

## 2021-03-24 RX ORDER — AMLODIPINE BESYLATE 5 MG/1
5 TABLET ORAL DAILY
Status: COMPLETED | OUTPATIENT
Start: 2021-03-24 | End: 2021-03-24

## 2021-03-24 RX ORDER — AMLODIPINE BESYLATE 5 MG/1
5 TABLET ORAL DAILY
Status: DISCONTINUED | OUTPATIENT
Start: 2021-03-24 | End: 2021-03-24

## 2021-03-24 RX ORDER — ECHINACEA PURPUREA EXTRACT 125 MG
1 TABLET ORAL EVERY 2 HOUR PRN
Status: DISCONTINUED | OUTPATIENT
Start: 2021-03-24 | End: 2021-03-25 | Stop reason: HOSPADM

## 2021-03-24 RX ORDER — HYDROXYZINE HYDROCHLORIDE 25 MG/1
25 TABLET, FILM COATED ORAL ONCE
Status: COMPLETED | OUTPATIENT
Start: 2021-03-24 | End: 2021-03-24

## 2021-03-24 RX ORDER — LOSARTAN POTASSIUM 25 MG/1
25 TABLET ORAL DAILY
Status: COMPLETED | OUTPATIENT
Start: 2021-03-24 | End: 2021-03-24

## 2021-03-24 RX ORDER — LOSARTAN POTASSIUM 50 MG/1
50 TABLET ORAL DAILY
Status: DISCONTINUED | OUTPATIENT
Start: 2021-03-25 | End: 2021-03-25

## 2021-03-24 RX ORDER — AMLODIPINE BESYLATE 10 MG/1
10 TABLET ORAL DAILY
Status: DISCONTINUED | OUTPATIENT
Start: 2021-03-25 | End: 2021-03-25 | Stop reason: HOSPADM

## 2021-03-24 RX ADMIN — BIMATOPROST 1 DROP: 0.1 SOLUTION/ DROPS OPHTHALMIC at 21:53

## 2021-03-24 RX ADMIN — SODIUM CHLORIDE 7.5 MG/HR: 0.9 INJECTION, SOLUTION INTRAVENOUS at 22:51

## 2021-03-24 RX ADMIN — SODIUM CHLORIDE 10 MG/HR: 0.9 INJECTION, SOLUTION INTRAVENOUS at 00:31

## 2021-03-24 RX ADMIN — SODIUM CHLORIDE 10 MG/HR: 0.9 INJECTION, SOLUTION INTRAVENOUS at 02:33

## 2021-03-24 RX ADMIN — TOBRAMYCIN AND DEXAMETHASONE 1 DROP: 3; 1 SUSPENSION/ DROPS OPHTHALMIC at 11:55

## 2021-03-24 RX ADMIN — ASPIRIN 81 MG: 81 TABLET, CHEWABLE ORAL at 08:36

## 2021-03-24 RX ADMIN — HEPARIN SODIUM 5000 UNITS: 5000 INJECTION INTRAVENOUS; SUBCUTANEOUS at 20:13

## 2021-03-24 RX ADMIN — METOPROLOL SUCCINATE 25 MG: 25 TABLET, FILM COATED, EXTENDED RELEASE ORAL at 08:34

## 2021-03-24 RX ADMIN — CLOPIDOGREL BISULFATE 75 MG: 75 TABLET, FILM COATED ORAL at 08:33

## 2021-03-24 RX ADMIN — OXYCODONE HYDROCHLORIDE 5 MG: 5 TABLET ORAL at 14:11

## 2021-03-24 RX ADMIN — INSULIN LISPRO 2 UNITS: 100 INJECTION, SOLUTION INTRAVENOUS; SUBCUTANEOUS at 16:46

## 2021-03-24 RX ADMIN — INSULIN LISPRO 1 UNITS: 100 INJECTION, SOLUTION INTRAVENOUS; SUBCUTANEOUS at 21:55

## 2021-03-24 RX ADMIN — HEPARIN SODIUM 5000 UNITS: 5000 INJECTION INTRAVENOUS; SUBCUTANEOUS at 08:39

## 2021-03-24 RX ADMIN — TOBRAMYCIN AND DEXAMETHASONE 1 DROP: 3; 1 SUSPENSION/ DROPS OPHTHALMIC at 17:22

## 2021-03-24 RX ADMIN — AMLODIPINE BESYLATE 5 MG: 5 TABLET ORAL at 10:33

## 2021-03-24 RX ADMIN — PANTOPRAZOLE SODIUM 40 MG: 40 TABLET, DELAYED RELEASE ORAL at 05:10

## 2021-03-24 RX ADMIN — AMLODIPINE BESYLATE 5 MG: 5 TABLET ORAL at 16:05

## 2021-03-24 RX ADMIN — LOSARTAN POTASSIUM 25 MG: 25 TABLET, FILM COATED ORAL at 09:33

## 2021-03-24 RX ADMIN — TOBRAMYCIN AND DEXAMETHASONE 1 DROP: 3; 1 SUSPENSION/ DROPS OPHTHALMIC at 05:11

## 2021-03-24 RX ADMIN — SODIUM CHLORIDE 10 MG/HR: 0.9 INJECTION, SOLUTION INTRAVENOUS at 05:11

## 2021-03-24 RX ADMIN — HYDROXYZINE HYDROCHLORIDE 25 MG: 25 TABLET, FILM COATED ORAL at 20:20

## 2021-03-24 RX ADMIN — INSULIN LISPRO 1 UNITS: 100 INJECTION, SOLUTION INTRAVENOUS; SUBCUTANEOUS at 07:39

## 2021-03-24 RX ADMIN — SODIUM CHLORIDE 7.5 MG/HR: 0.9 INJECTION, SOLUTION INTRAVENOUS at 09:21

## 2021-03-24 RX ADMIN — OXYCODONE HYDROCHLORIDE 5 MG: 5 TABLET ORAL at 08:30

## 2021-03-24 RX ADMIN — LOSARTAN POTASSIUM 25 MG: 25 TABLET, FILM COATED ORAL at 08:34

## 2021-03-24 RX ADMIN — ATORVASTATIN CALCIUM 80 MG: 80 TABLET, FILM COATED ORAL at 17:22

## 2021-03-24 RX ADMIN — Medication 1000 UNITS: at 08:36

## 2021-03-24 RX ADMIN — SODIUM CHLORIDE 5 MG/HR: 0.9 INJECTION, SOLUTION INTRAVENOUS at 18:00

## 2021-03-24 RX ADMIN — LORATADINE 10 MG: 10 TABLET ORAL at 08:35

## 2021-03-24 RX ADMIN — OXYCODONE HYDROCHLORIDE 5 MG: 5 TABLET ORAL at 02:35

## 2021-03-24 RX ADMIN — INSULIN LISPRO 3 UNITS: 100 INJECTION, SOLUTION INTRAVENOUS; SUBCUTANEOUS at 11:55

## 2021-03-24 NOTE — QUICK NOTE
Patient's wife, Emma Jones, was updated at bedside  All questions answered      Blaine Suarez DO, Luite Tee 87  PGY-1, Internal Medicine  [facility]

## 2021-03-24 NOTE — PROGRESS NOTES
[address] [facility]  Progress Note - Shahnaz Moss 1955, 72 y o  male MRN: [id number]  Unit/Bed#: [facility] 165-62 Encounter: [id number]  Primary Care Provider: Ronni Izaguirre MD   Date and time admitted to hospital: 3/20/2021  8:42 PM    * Left carotid artery stenosis  Assessment & Plan  71 y/o M w/ PMHx HTN, HLD, DMT2, CAD s/p PCI in 2017, Factor V Leiden, DVT (2013, not on [facility]), who presented with symptomatic L ICA stenosis (Transient Right-sided weakness)    S/P Left CEA 3/22    Plan:  Continue ASA, Plavix, statin  Continue to wean Cardene gtt, currently receiving Cozaar and metoprolol, Willow can be removed when off Cardene  Hoarseness and swallowing improving per patient, continue dysphagia level 3 diet w/ thin liq, speech following           Subjective:  Pt feeling better this am, hoarseness and swallowing is improving, neuro intact    Vitals:  /68   Pulse 103   Temp 98 4 °F (36 9 °C)   Resp 15   Ht 5' 9" (1 753 m)   Wt 76 6 kg (168 lb 12 8 oz)   SpO2 99%   BMI 24 93 kg/m²     I/Os:  I/O last 3 completed shifts: In: 4986 4 [P O :716; I V :4220 4; IV Piggyback:50]  Out: 7563 [Urine:6305]  No intake/output data recorded      Lab Results and Cultures:   Lab Results   Component Value Date    WBC 12 58 (H) 03/23/2021    HGB 13 1 03/23/2021    HCT 40 8 03/23/2021    MCV 90 03/23/2021     03/23/2021     Lab Results   Component Value Date    CALCIUM 8 5 03/24/2021    K 3 5 03/24/2021    CO2 22 03/24/2021     (H) 03/24/2021    BUN 13 03/24/2021    CREATININE 0 86 03/24/2021     Lab Results   Component Value Date    INR 1 05 03/23/2021    INR 1 02 03/22/2021    INR 0 91 03/12/2021    PROTIME 13 8 03/23/2021    PROTIME 13 4 03/22/2021    PROTIME 12 2 03/12/2021        Blood Culture: No results found for: BLOODCX,   Urinalysis: No results found for: Indira Dad, SPECGRAV, PHUR, LEUKOCYTESUR, NITRITE, PROTEINUA, GLUCOSEU, KETONESU, BILIRUBINUR, BLOODU,   Urine Culture: No results found for: URINECX,   Wound Culure: No results found for: WOUNDCULT    Medications:  Current Facility-Administered Medications   Medication Dose Route Frequency    acetaminophen (TYLENOL) tablet 650 mg  650 mg Oral Q6H PRN    aspirin chewable tablet 81 mg  81 mg Oral QAM    atorvastatin (LIPITOR) tablet 80 mg  80 mg Oral QPM    bimatoprost (LUMIGAN) 0 01 % ophthalmic solution 1 drop  1 drop Both Eyes HS    bimatoprost (LUMIGAN) 0 01 % ophthalmic solution 1 drop  1 drop Both Eyes HS    cholecalciferol (VITAMIN D3) tablet 1,000 Units  1,000 Units Oral Daily    clopidogrel (PLAVIX) tablet 75 mg  75 mg Oral Daily    fluticasone (FLONASE) 50 mcg/act nasal spray 2 spray  2 spray Each Nare Daily    heparin (porcine) subcutaneous injection 5,000 Units  5,000 Units Subcutaneous Q12H [facility]    hydrALAZINE (APRESOLINE) injection 15 mg  15 mg Intravenous Q15 Min PRN    And    niCARdipine (CARDENE) 25 mg (STANDARD CONCENTRATION) in sodium chloride 0 9% 250 mL  1-15 mg/hr Intravenous Continuous PRN    hydrALAZINE (APRESOLINE) injection 5 mg  5 mg Intravenous Q6H PRN    insulin lispro (HumaLOG) 100 units/mL subcutaneous injection 1-5 Units  1-5 Units Subcutaneous HS    insulin lispro (HumaLOG) 100 units/mL subcutaneous injection 1-6 Units  1-6 Units Subcutaneous TID AC    loratadine (CLARITIN) tablet 10 mg  10 mg Oral Daily    losartan (COZAAR) tablet 25 mg  25 mg Oral Daily    metoprolol succinate (TOPROL-XL) 24 hr tablet 25 mg  25 mg Oral Daily    ondansetron (ZOFRAN) injection 4 mg  4 mg Intravenous Q6H PRN    oxyCODONE (ROXICODONE) IR tablet 5 mg  5 mg Oral Q4H PRN    pantoprazole (PROTONIX) EC tablet 40 mg  40 mg Oral Early Morning    tobramycin-dexamethasone (TOBRADEX) 0 3-0 1 % ophthalmic suspension 1 drop  1 drop Left Eye Q6H [facility]       Physical Exam:    General appearance: alert and oriented, in no acute distress  Neurologic: Mental status: Alert, oriented, thought content appropriate  Sensory: normal  Motor: grossly normal, Face symmetric, tongue midline  Lungs: clear to auscultation bilaterally  Heart: regular rate and rhythm, S1, S2 normal, no murmur, click, rub or gallop  Abdomen: soft, non-tender; bowel sounds normal; no masses,  no organomegaly  Extremities: extremities normal, warm and well-perfused; no cyanosis, clubbing, or edema    Wound/Incision:  Left neck incision clean, dry, and intact        Miah Nieves PA-C  3/24/2021

## 2021-03-24 NOTE — PROGRESS NOTES
INTERNAL MEDICINE RESIDENCY PROGRESS NOTE     Name: Patria Finley   Age & Sex: 72 y o  male   MRN: [id number]  Unit/Bed#: [facility] 519-01   Encounter: [id number]  Team: SOD Team C     PATIENT INFORMATION     Name: Patria Finley   Age & Sex: 72 y o  male   MRN: [id number]  Hospital Stay Days: 4    ASSESSMENT/PLAN     Principal Problem:    Left carotid artery stenosis  Active Problems:    TIA (transient ischemic attack)    Coronary artery disease    Diabetes mellitus, type 2 ([facility] )    Hypertensive urgency    Hyperlipidemia    Emanuel's esophagus    Sleep apnea    CPAP (continuous positive airway pressure) dependence    H/O blood clots    Fatty liver    GERD (gastroesophageal reflux disease)    Hypercoagulable state ([facility] )      TIA (transient ischemic attack)  Assessment & Plan  Patient presented with intermittent right hand numbness, right leg numbness  Loaded with plavix prior to transfer  Patient's LDL was 48 and hemoglobin A1c was 7 2  TA likely related to significant carotid stenosis  CT brain: No acute intracranial abnormality  MRI brain: No acute infarction  CT H/N: Severe stenosis of the left ICA approximately 1 2 centimeter distal to the carotid bifurcation secondary to atheromatous plaque  Minimal luminal diameter of 1 millimeter corresponding to approximately 70% stenosis  Echo with bubble study: EF 55-60% without any regional wall motion abnormalities, mild concentric hypertrophy, grade 1 diastolic dysfunction without any left-to-right shunt  VAS Doppler: RIGHT: There is <50% stenosis noted in the internal carotid artery  Plaque is heterogenous and irregular  LEFT: There is 70-99% stenosis noted in the internal carotid artery  Plaque is heterogenous and irregular  Plan:  · Aspirin 81 mg p o  Daily  · Continue Plavix 75 mg p o  Daily  · Underwent Neurology evaluation at [facility]  Would benefit from f/u with outpatient Neuro   · Vascular consulted  See L carotid artery stenosis plan      * Left carotid artery stenosis  Assessment & Plan  CT H/N: Severe stenosis of the left ICA approximately 1 2 centimeter distal to the carotid bifurcation secondary to atheromatous block-70% stenosis as per report  VAS Doppler: RIGHT: There is <50% stenosis noted in the internal carotid artery  Plaque is heterogenous and irregular  LEFT: There is 70-99% stenosis noted in the internal carotid artery  Plaque is heterogenous and irregular  Vascular suspects left ICA stenosis closer to 90%  Plan:  · Vascular surgery consulted  · S/p Left carotid endarterectomy 3/22/21  · Continue plavix 75 mg daily, ASA 81 mg daily, and atorvastatin 80 mg QD  · Wean cardene gtt today, increase losartan to 50 mg QD, add amlodipine 5 mg QD         Coronary artery disease  Assessment & Plan  Status post PCI x1 in 2017  Patient complaining of some exertional shortness of breath on presentation  Serial cardiac enzymes were negative  Echo showed EF of 50 for 60% without any regional wall motion abnormalities, grade 1 diastolic dysfunction without any right left shin  Plan:  · Continue metoprolol 25 mg daily    Diabetes mellitus, type 2 [facility])  Assessment & Plan  Lab Results   Component Value Date    HGBA1C 7 2 (H) 03/19/2021       Recent Labs     03/19/21  2033 03/20/21  0730 03/20/21  1130 03/20/21  1634   POCGLU 174* 134 282* 124       Blood Sugar Average: Last 72 hrs:     · Metformin has been on hold  · Humalog sliding scale with Accu-Cheks q a c  And hs    Hypertensive urgency  Assessment & Plan  Patient's blood pressure initially in the ED was 226/96 and persistently elevated with systolic in 808W  BP controlled upon transfer to [facility]  Plan:  · Continue Toprol-XL 25 milligram p o  Daily  · Wean cardene gtt, increase losartan to 50 mg QD, add amlodipine 5 mg QD, possibly increase losartan tomorrow to 100 mg  · Will order hydralazine p r n  For SBP more than 170    Hyperlipidemia  Assessment & Plan  LDL level is 45   Patient was on lipitor 40 mg daily at home  Plan:  · Lipitor 80 mg p o  Daily      Disposition: Continue inpatient care, possible discharge tomorrow     SUBJECTIVE     Patient seen and examined  No acute events overnight  Patient was resting comfortably in bed  Still reports some postoperative pain on the left side of his neck  He is tolerating his diet currently, is being followed by speech for progression  Denies hearing/vision changes, fevers, chills, night sweats, chest pain, shortness of breath, abdominal pain, nausea, vomiting, diarrhea, constipation, dysuria, and peripheral edema  Plan to wean the patient off Cardene drip today, increase losartan, and possible discharge tomorrow  OBJECTIVE     Vitals:    21 0922 21 1033 21 1105 21 1200   BP: 154/69 145/65 158/72    Pulse:   105    Resp:   17    Temp:   98 7 °F (37 1 °C)    TempSrc:   Oral    SpO2:    99%   Weight:       Height:          Temperature:   Temp (24hrs), Av 6 °F (37 °C), Min:98 4 °F (36 9 °C), Max:98 7 °F (37 1 °C)    Temperature: 98 7 °F (37 1 °C)  Intake & Output:  I/O        07 -  0700  07 -  0700  07 -  0700    P  O  120 596 300    I V  (mL/kg) 4031 7 (52 6) 2620 (34 2)     IV Piggyback 50      Total Intake(mL/kg) 4201 7 (54 9) 3216 (42) 300 (3 9)    Urine (mL/kg/hr) 3355 (1 8) 4050 (2 2) 1100 (2 5)    Blood 300      Total Output 3655 4050 1100    Net +546 7 -645 -264               Weights:   IBW: 70 7 kg    Body mass index is 24 93 kg/m²  Weight (last 2 days)     None        Physical Exam  Vitals signs and nursing note reviewed  Constitutional:       Appearance: Normal appearance  HENT:      Head: Normocephalic and atraumatic  Right Ear: External ear normal       Left Ear: External ear normal       Nose: Nose normal       Mouth/Throat:      Mouth: Mucous membranes are moist       Pharynx: Oropharynx is clear  Eyes:      Extraocular Movements: Extraocular movements intact  Pupils: Pupils are equal, round, and reactive to light  Neck:      Comments: L endarterectomy post-op changes  Cardiovascular:      Rate and Rhythm: Normal rate and regular rhythm  Pulses: Normal pulses  Heart sounds: No murmur  Pulmonary:      Effort: Pulmonary effort is normal       Breath sounds: Normal breath sounds  Abdominal:      General: Abdomen is flat  There is no distension  Palpations: Abdomen is soft  Tenderness: There is no abdominal tenderness  Musculoskeletal: Normal range of motion  Skin:     General: Skin is warm and dry  Capillary Refill: Capillary refill takes less than 2 seconds  Neurological:      General: No focal deficit present  Mental Status: He is alert and oriented to person, place, and time  Psychiatric:         Mood and Affect: Mood normal          Behavior: Behavior normal        LABORATORY DATA     Labs: I have personally reviewed pertinent reports  Results from last 7 days   Lab Units 03/24/21  0509 03/23/21  0530 03/22/21  1612   WBC Thousand/uL 11 43* 12 58* 15 27*   HEMOGLOBIN g/dL 12 6 13 1 13 9   HEMATOCRIT % 39 7 40 8 43 6   PLATELETS Thousands/uL 197 200 210   NEUTROS PCT % 73 77* 85*   MONOS PCT % 10 11 2*      Results from last 7 days   Lab Units 03/24/21  0509 03/23/21  0530 03/22/21  1612 03/22/21  0552  03/18/21  1401   POTASSIUM mmol/L 3 5 3 7 4 2 3 9   < > 4 2   CHLORIDE mmol/L 115* 111* 110* 111*   < > 103   CO2 mmol/L 22 22 24 24   < > 28   BUN mg/dL 13 19 21 23   < > 21   CREATININE mg/dL 0 86 1 21 1 40* 1 25   < > 1 31*   CALCIUM mg/dL 8 5 8 5 8 6 9 0   < > 9 6   ALK PHOS U/L  --   --  56 59  --  64   ALT U/L  --   --  34 31  --  45   AST U/L  --   --  17 12  --  21    < > = values in this interval not displayed       Results from last 7 days   Lab Units 03/22/21  0552 03/18/21  1401   MAGNESIUM mg/dL 2 4 2 1     Results from last 7 days   Lab Units 03/22/21  0552   PHOSPHORUS mg/dL 3 0      Results from last 7 days Lab Units 03/23/21  0530 03/22/21  1612   INR  1 05 1 02   PTT seconds 26 28         Results from last 7 days   Lab Units 03/18/21  2226 03/18/21  1926 03/18/21  1404   TROPONIN I ng/mL <0 02 <0 02 <0 02       IMAGING & DIAGNOSTIC TESTING     Radiology Results: I have personally reviewed pertinent reports  No results found  Other Diagnostic Testing: I have personally reviewed pertinent reports      ACTIVE MEDICATIONS     Current Facility-Administered Medications   Medication Dose Route Frequency    acetaminophen (TYLENOL) tablet 650 mg  650 mg Oral Q6H PRN    amLODIPine (NORVASC) tablet 5 mg  5 mg Oral Daily    aspirin chewable tablet 81 mg  81 mg Oral QAM    atorvastatin (LIPITOR) tablet 80 mg  80 mg Oral QPM    bimatoprost (LUMIGAN) 0 01 % ophthalmic solution 1 drop  1 drop Both Eyes HS    bimatoprost (LUMIGAN) 0 01 % ophthalmic solution 1 drop  1 drop Both Eyes HS    cholecalciferol (VITAMIN D3) tablet 1,000 Units  1,000 Units Oral Daily    clopidogrel (PLAVIX) tablet 75 mg  75 mg Oral Daily    fluticasone (FLONASE) 50 mcg/act nasal spray 2 spray  2 spray Each Nare Daily    heparin (porcine) subcutaneous injection 5,000 Units  5,000 Units Subcutaneous Q12H [address]    hydrALAZINE (APRESOLINE) injection 15 mg  15 mg Intravenous Q15 Min PRN    And    niCARdipine (CARDENE) 25 mg (STANDARD CONCENTRATION) in sodium chloride 0 9% 250 mL  1-15 mg/hr Intravenous Continuous PRN    hydrALAZINE (APRESOLINE) injection 5 mg  5 mg Intravenous Q6H PRN    insulin lispro (HumaLOG) 100 units/mL subcutaneous injection 1-5 Units  1-5 Units Subcutaneous HS    insulin lispro (HumaLOG) 100 units/mL subcutaneous injection 1-6 Units  1-6 Units Subcutaneous TID AC    loratadine (CLARITIN) tablet 10 mg  10 mg Oral Daily    [START ON 3/25/2021] losartan (COZAAR) tablet 50 mg  50 mg Oral Daily    metoprolol succinate (TOPROL-XL) 24 hr tablet 25 mg  25 mg Oral Daily    ondansetron (ZOFRAN) injection 4 mg  4 mg Intravenous Q6H PRN    oxyCODONE (ROXICODONE) IR tablet 5 mg  5 mg Oral Q4H PRN    pantoprazole (PROTONIX) EC tablet 40 mg  40 mg Oral Early Morning    tobramycin-dexamethasone (TOBRADEX) 0 3-0 1 % ophthalmic suspension 1 drop  1 drop Left Eye Q6H [address]       VTE Pharmacologic Prophylaxis: Heparin  VTE Mechanical Prophylaxis: sequential compression device    Portions of the record may have been created with voice recognition software  Occasional wrong word or "sound a like" substitutions may have occurred due to the inherent limitations of voice recognition software    Read the chart carefully and recognize, using context, where substitutions have occurred   ==  Laura Garcia, [address] [facility]  Internal Medicine Residency PGY-1

## 2021-03-24 NOTE — PROGRESS NOTES
Vascular Nurse Navigator Post Op Education    Met with patient to introduce myself as Vascular Nurse Navigator and explained my role  Patient is appropriate and accepting to education  Patient was educated with Review of written materials provided, Teachback, Explanation, Demonstration and Question & Answer on expectations of post op care and recovery on Left CEA  Patient is a former smoker, quit 31 years ago, as such Smoking effects on the lungs, tobacco triggers and Smoking cessation was reviewed  Education provided to patient on infection prevention, activity limitations, when to call the office, importance of follow up, and incisional care  Discharge instruction handout provided to patient to review
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
## 2021-03-24 NOTE — ASSESSMENT & PLAN NOTE
73 y/o M w/ PMHx HTN, HLD, DMT2, CAD s/p PCI in 2017, Factor V Leiden, DVT (2013, not on [facility]), who presented with symptomatic L ICA stenosis (Transient Right-sided weakness)    S/P Left CEA 3/22    Plan:  Continue ASA, Plavix, statin  Continue to wean Cardene gtt, currently receiving Cozaar and metoprolol, Willow can be removed when off Cardene  Hoarseness and swallowing improving per patient, continue dysphagia level 3 diet w/ thin liq, speech following
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
## 2021-03-25 VITALS
HEART RATE: 105 BPM | OXYGEN SATURATION: 97 % | DIASTOLIC BLOOD PRESSURE: 81 MMHG | TEMPERATURE: 98.8 F | SYSTOLIC BLOOD PRESSURE: 174 MMHG | WEIGHT: 168.8 LBS | BODY MASS INDEX: 25 KG/M2 | RESPIRATION RATE: 19 BRPM | HEIGHT: 69 IN

## 2021-03-25 PROBLEM — I65.22 LEFT CAROTID ARTERY STENOSIS: Status: RESOLVED | Noted: 2021-03-19 | Resolved: 2021-03-25

## 2021-03-25 PROBLEM — I16.0 HYPERTENSIVE URGENCY: Status: RESOLVED | Noted: 2021-03-18 | Resolved: 2021-03-25

## 2021-03-25 LAB
ANION GAP SERPL CALCULATED.3IONS-SCNC: 8 MMOL/L (ref 4–13)
BUN SERPL-MCNC: 13 MG/DL (ref 5–25)
CALCIUM SERPL-MCNC: 9.4 MG/DL (ref 8.3–10.1)
CHLORIDE SERPL-SCNC: 111 MMOL/L (ref 100–108)
CO2 SERPL-SCNC: 23 MMOL/L (ref 21–32)
CREAT SERPL-MCNC: 0.93 MG/DL (ref 0.6–1.3)
ERYTHROCYTE [DISTWIDTH] IN BLOOD BY AUTOMATED COUNT: 13.9 % (ref 11.6–15.1)
GFR SERPL CREATININE-BSD FRML MDRD: 86 ML/MIN/1.73SQ M
GLUCOSE SERPL-MCNC: 204 MG/DL (ref 65–140)
GLUCOSE SERPL-MCNC: 248 MG/DL (ref 65–140)
GLUCOSE SERPL-MCNC: 267 MG/DL (ref 65–140)
HCT VFR BLD AUTO: 41.6 % (ref 36.5–49.3)
HGB BLD-MCNC: 13.6 G/DL (ref 12–17)
MCH RBC QN AUTO: 29.2 PG (ref 26.8–34.3)
MCHC RBC AUTO-ENTMCNC: 32.7 G/DL (ref 31.4–37.4)
MCV RBC AUTO: 89 FL (ref 82–98)
PLATELET # BLD AUTO: 221 THOUSANDS/UL (ref 149–390)
PMV BLD AUTO: 12.3 FL (ref 8.9–12.7)
POTASSIUM SERPL-SCNC: 3.3 MMOL/L (ref 3.5–5.3)
RBC # BLD AUTO: 4.66 MILLION/UL (ref 3.88–5.62)
SODIUM SERPL-SCNC: 142 MMOL/L (ref 136–145)
WBC # BLD AUTO: 11.88 THOUSAND/UL (ref 4.31–10.16)

## 2021-03-25 PROCEDURE — 80048 BASIC METABOLIC PNL TOTAL CA: CPT | Performed by: SURGERY

## 2021-03-25 PROCEDURE — 99238 HOSP IP/OBS DSCHRG MGMT 30/<: CPT | Performed by: INTERNAL MEDICINE

## 2021-03-25 PROCEDURE — 82948 REAGENT STRIP/BLOOD GLUCOSE: CPT

## 2021-03-25 PROCEDURE — 92526 ORAL FUNCTION THERAPY: CPT

## 2021-03-25 PROCEDURE — 99024 POSTOP FOLLOW-UP VISIT: CPT | Performed by: SURGERY

## 2021-03-25 PROCEDURE — 85027 COMPLETE CBC AUTOMATED: CPT | Performed by: SURGERY

## 2021-03-25 RX ORDER — CHLORTHALIDONE 25 MG/1
12.5 TABLET ORAL DAILY
Qty: 30 TABLET | Refills: 3 | Status: SHIPPED | OUTPATIENT
Start: 2021-03-26 | End: 2022-03-30

## 2021-03-25 RX ORDER — CLOPIDOGREL BISULFATE 75 MG/1
75 TABLET ORAL DAILY
Qty: 30 TABLET | Refills: 3 | Status: SHIPPED | OUTPATIENT
Start: 2021-03-26 | End: 2022-03-30

## 2021-03-25 RX ORDER — AMLODIPINE BESYLATE 10 MG/1
10 TABLET ORAL DAILY
Qty: 30 TABLET | Refills: 3 | Status: SHIPPED | OUTPATIENT
Start: 2021-03-26 | End: 2022-03-30

## 2021-03-25 RX ORDER — POTASSIUM CHLORIDE 20 MEQ/1
40 TABLET, EXTENDED RELEASE ORAL 2 TIMES DAILY
Status: DISCONTINUED | OUTPATIENT
Start: 2021-03-25 | End: 2021-03-25 | Stop reason: HOSPADM

## 2021-03-25 RX ORDER — CHLORTHALIDONE 25 MG/1
12.5 TABLET ORAL DAILY
Status: DISCONTINUED | OUTPATIENT
Start: 2021-03-25 | End: 2021-03-25 | Stop reason: HOSPADM

## 2021-03-25 RX ORDER — LOSARTAN POTASSIUM 50 MG/1
50 TABLET ORAL ONCE
Status: COMPLETED | OUTPATIENT
Start: 2021-03-25 | End: 2021-03-25

## 2021-03-25 RX ORDER — ATORVASTATIN CALCIUM 80 MG/1
80 TABLET, FILM COATED ORAL EVERY EVENING
Qty: 30 TABLET | Refills: 3 | Status: SHIPPED | OUTPATIENT
Start: 2021-03-25 | End: 2021-07-19

## 2021-03-25 RX ORDER — LOSARTAN POTASSIUM 50 MG/1
100 TABLET ORAL DAILY
Status: DISCONTINUED | OUTPATIENT
Start: 2021-03-26 | End: 2021-03-25 | Stop reason: HOSPADM

## 2021-03-25 RX ORDER — LOSARTAN POTASSIUM 100 MG/1
100 TABLET ORAL DAILY
Qty: 30 TABLET | Refills: 3 | Status: SHIPPED | OUTPATIENT
Start: 2021-03-26 | End: 2021-04-07 | Stop reason: DRUGHIGH

## 2021-03-25 RX ADMIN — LORATADINE 10 MG: 10 TABLET ORAL at 08:06

## 2021-03-25 RX ADMIN — LOSARTAN POTASSIUM 50 MG: 50 TABLET, FILM COATED ORAL at 10:43

## 2021-03-25 RX ADMIN — METOPROLOL SUCCINATE 25 MG: 25 TABLET, FILM COATED, EXTENDED RELEASE ORAL at 08:06

## 2021-03-25 RX ADMIN — CLOPIDOGREL BISULFATE 75 MG: 75 TABLET, FILM COATED ORAL at 08:06

## 2021-03-25 RX ADMIN — ASPIRIN 81 MG: 81 TABLET, CHEWABLE ORAL at 08:06

## 2021-03-25 RX ADMIN — POTASSIUM CHLORIDE 40 MEQ: 1500 TABLET, EXTENDED RELEASE ORAL at 09:36

## 2021-03-25 RX ADMIN — INSULIN LISPRO 3 UNITS: 100 INJECTION, SOLUTION INTRAVENOUS; SUBCUTANEOUS at 07:31

## 2021-03-25 RX ADMIN — SODIUM CHLORIDE 10 MG/HR: 0.9 INJECTION, SOLUTION INTRAVENOUS at 05:15

## 2021-03-25 RX ADMIN — TOBRAMYCIN AND DEXAMETHASONE 1 DROP: 3; 1 SUSPENSION/ DROPS OPHTHALMIC at 05:02

## 2021-03-25 RX ADMIN — CHLORTHALIDONE 12.5 MG: 25 TABLET ORAL at 11:51

## 2021-03-25 RX ADMIN — LOSARTAN POTASSIUM 50 MG: 50 TABLET, FILM COATED ORAL at 08:06

## 2021-03-25 RX ADMIN — INSULIN LISPRO 3 UNITS: 100 INJECTION, SOLUTION INTRAVENOUS; SUBCUTANEOUS at 11:45

## 2021-03-25 RX ADMIN — HEPARIN SODIUM 5000 UNITS: 5000 INJECTION INTRAVENOUS; SUBCUTANEOUS at 08:06

## 2021-03-25 RX ADMIN — AMLODIPINE BESYLATE 10 MG: 10 TABLET ORAL at 08:06

## 2021-03-25 RX ADMIN — SODIUM CHLORIDE 10 MG/HR: 0.9 INJECTION, SOLUTION INTRAVENOUS at 01:56

## 2021-03-25 RX ADMIN — PANTOPRAZOLE SODIUM 40 MG: 40 TABLET, DELAYED RELEASE ORAL at 05:02

## 2021-03-25 RX ADMIN — Medication 1000 UNITS: at 08:06

## 2021-03-25 RX ADMIN — TOBRAMYCIN AND DEXAMETHASONE 1 DROP: 3; 1 SUSPENSION/ DROPS OPHTHALMIC at 11:46

## 2021-03-25 NOTE — CASE MANAGEMENT
ANATOLY received VM from Ricardo Alex at [facility]  She said the CM office # there is [id number]  The prior Authorization number is [id number] and the PAF intact number [id number]  CM spoke with Ricardo Alex  She said she did not need anything just wanted CM to have these numbers  CM made her aware pt discharging  She said she will f/u with him next week

## 2021-03-25 NOTE — DISCHARGE INSTRUCTIONS
DISCHARGE INSTRUCTIONS                       CAROTID ENDARTERECTOMY  Following discharge from the hospital, you may have some questions about your operation, your activities or your general condition  These instructions may answer some of your questions and help you adjust during the first few weeks following your operation  ACTIVITY: Resume your normal activities and exercise schedule as tolerated  If you were driving prior to surgery, you may resume driving one week following discharge from the hospital  You may ride in a car upon discharge  DIET: Resume your normal diet  Try to eat low fat and low cholesterol foods  RECURRENT SYMPTOMS: If you develop any new numbness, weakness, blindness or difficulty with speech after discharge call 911 or go to an emergency department immediately  INCISION:   ·  shower daily  ·  Wash incision daily with soap and water  Pat dry thoroughly    Your surgeon may have chosen to use a type of adhesive glue to close your incision  The glue is used to cover the incision, assist in closure, and prevent contamination  This adhesive will darken and peel away on its own within one to two weeks  You may shower the day after your surgery, but do not scrub the incision  If you do not have this adhesive glue, you may include the operated area in a shower on the third day following surgery  It is normal to have swelling or discoloration around the incision  If increasing redness or pain develops, call our office immediately  Numbness in the region of the incision may occur following the surgery  This normally resolves in six to twelve months  FOLLOW UP STUDIES: Doppler ultrasound studies are very important to your post-operative care  Your surgeon will arrange them at your first postoperative visit  The first study is due 3 months after surgery      Christelle palmer/ Dr Castorena July: 4/7/2021 at 8:15am, [facility] [place] office  [address] [place] [address] 20, [address] [place] [address] [address] , [address], [place], [address]  [address], [address], [address] [facility]  [address], [facility], [address]  611 [facility], [place], [address]  Ellen Gaston [address], 1st  Floor, Harry Myers 34  [facility] 19, [address] [facility], [address] [facility], [facility] 97   12[address] [facility], [address] [facility], [place], [address]  [address] [address], 532 [facility], [facility], Jose Luis [place] 6

## 2021-03-25 NOTE — PROGRESS NOTES
[facility]  Progress Note - Curlee Pod 1955, 72 y o  male MRN: [id number]  Unit/Bed#: [facility] 656-51 Encounter: [id number]  Primary Care Provider: Kaleb Kim MD   Date and time admitted to hospital: 3/20/2021  8:42 PM    * Left carotid artery stenosis  Assessment & Plan  73 y/o M w/ PMHx HTN, HLD, DMT2, CAD s/p PCI in 2017, Factor V Leiden, DVT (2013, not on [facility]), who presented with symptomatic L ICA stenosis (Transient Right-sided weakness)    S/P Left CEA 3/22    Plan:  Continue ASA, Plavix, statin  Continue to wean Cardene gtt, currently receiving Norvasc, Cozaar and metoprolol (dose adjustments per medical team), Willow can be removed when off Cardene  Hoarseness and swallowing continues to improve per patient, continue dysphagia level 3 diet w/ thin liq, speech following  OK for discharge from vascular perspective when off Cardene and BP stable on po meds          Subjective:  Pt feeling better today, remains hoarse w/ sore throat but improving, swallowing is improved    Vitals:  /64 (BP Location: Left arm)   Pulse 104   Temp 98 8 °F (37 1 °C) (Axillary)   Resp 20   Ht 5' 9" (1 753 m)   Wt 76 6 kg (168 lb 12 8 oz)   SpO2 97%   BMI 24 93 kg/m²     I/Os:  I/O last 3 completed shifts: In: 2041 [P O :1016; I V :1025]  Out: 5975 [Urine:5975]  No intake/output data recorded      Lab Results and Cultures:   Lab Results   Component Value Date    WBC 11 88 (H) 03/25/2021    HGB 13 6 03/25/2021    HCT 41 6 03/25/2021    MCV 89 03/25/2021     03/25/2021     Lab Results   Component Value Date    CALCIUM 9 4 03/25/2021    K 3 3 (L) 03/25/2021    CO2 23 03/25/2021     (H) 03/25/2021    BUN 13 03/25/2021    CREATININE 0 93 03/25/2021     Lab Results   Component Value Date    INR 1 05 03/23/2021    INR 1 02 03/22/2021    INR 0 91 03/12/2021    PROTIME 13 8 03/23/2021    PROTIME 13 4 03/22/2021    PROTIME 12 2 03/12/2021        Blood Culture: No results found for: BLOODCX,   Urinalysis: No results found for: COLORU, CLARITYU, SPECGRAV, PHUR, LEUKOCYTESUR, NITRITE, PROTEINUA, GLUCOSEU, KETONESU, BILIRUBINUR, BLOODU,   Urine Culture: No results found for: URINECX,   Wound Culure: No results found for: WOUNDCULT    Medications:  Current Facility-Administered Medications   Medication Dose Route Frequency    acetaminophen (TYLENOL) tablet 650 mg  650 mg Oral Q6H PRN    amLODIPine (NORVASC) tablet 10 mg  10 mg Oral Daily    aspirin chewable tablet 81 mg  81 mg Oral QAM    atorvastatin (LIPITOR) tablet 80 mg  80 mg Oral QPM    bimatoprost (LUMIGAN) 0 01 % ophthalmic solution 1 drop  1 drop Both Eyes HS    cholecalciferol (VITAMIN D3) tablet 1,000 Units  1,000 Units Oral Daily    clopidogrel (PLAVIX) tablet 75 mg  75 mg Oral Daily    fluticasone (FLONASE) 50 mcg/act nasal spray 2 spray  2 spray Each Nare Daily    heparin (porcine) subcutaneous injection 5,000 Units  5,000 Units Subcutaneous Q12H [facility]    hydrALAZINE (APRESOLINE) injection 5 mg  5 mg Intravenous Q6H PRN    insulin lispro (HumaLOG) 100 units/mL subcutaneous injection 1-5 Units  1-5 Units Subcutaneous HS    insulin lispro (HumaLOG) 100 units/mL subcutaneous injection 1-6 Units  1-6 Units Subcutaneous TID AC    loratadine (CLARITIN) tablet 10 mg  10 mg Oral Daily    losartan (COZAAR) tablet 50 mg  50 mg Oral Daily    metoprolol succinate (TOPROL-XL) 24 hr tablet 25 mg  25 mg Oral Daily    niCARdipine (CARDENE) 25 mg (STANDARD CONCENTRATION) in sodium chloride 0 9% 250 mL  1-15 mg/hr Intravenous Continuous PRN    ondansetron (ZOFRAN) injection 4 mg  4 mg Intravenous Q6H PRN    oxyCODONE (ROXICODONE) IR tablet 5 mg  5 mg Oral Q4H PRN    pantoprazole (PROTONIX) EC tablet 40 mg  40 mg Oral Early Morning    sodium chloride (OCEAN) 0 65 % nasal spray 1 spray  1 spray Each Nare Q2H PRN    tobramycin-dexamethasone (TOBRADEX) 0 3-0 1 % ophthalmic suspension 1 drop  1 drop Left Eye [facility] Physical Exam:    General appearance: alert and oriented, in no acute distress  Neurologic: Mental status: Alert, oriented, thought content appropriate  Cranial nerves: normal  Sensory: normal  Motor: grossly normal  Lungs: clear to auscultation bilaterally  Heart: regular rate and rhythm, S1, S2 normal, no murmur, click, rub or gallop  Abdomen: soft, non-tender; bowel sounds normal; no masses,  no organomegaly  Extremities: extremities normal, warm and well-perfused; no cyanosis, clubbing, or edema    Wound/Incision:  Left neck incision clean, dry, and intact      Daja Marquez PA-C  3/25/2021

## 2021-03-25 NOTE — ASSESSMENT & PLAN NOTE
73 y/o M w/ PMHx HTN, HLD, DMT2, CAD s/p PCI in 2017, Factor V Leiden, DVT (2013, not on [facility]), who presented with symptomatic L ICA stenosis (Transient Right-sided weakness)    S/P Left CEA 3/22    Plan:  Continue ASA, Plavix, statin  Continue to wean Cardene gtt, currently receiving Norvasc, Cozaar and metoprolol (dose adjustments per medical team), Willow can be removed when off Cardene  Hoarseness and swallowing continues to improve per patient, continue dysphagia level 3 diet w/ thin liq, speech following  OK for discharge from vascular perspective when off Cardene and BP stable on po meds
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
## 2021-03-25 NOTE — DISCHARGE INSTR - OTHER ORDERS
L neck/ carotid incision:   ·  shower daily  ·   Wash incision daily with soap and water    Pat dry thoroughly

## 2021-03-25 NOTE — DISCHARGE SUMMARY
INTERNAL MEDICINE RESIDENCY DISCHARGE SUMMARY     Quentin Salazar   72 y o  male  MRN: [id number]  Room/Bed: [facility] 519/[facility] 519-01     [address] [address] MED SURG/SD   Encounter: [id number]    Principal Problem:    Left carotid artery stenosis  Active Problems:    TIA (transient ischemic attack)    Coronary artery disease    Diabetes mellitus, type 2 (HCC)    Hypertensive urgency    Hyperlipidemia    Emanuel's esophagus    Sleep apnea    CPAP (continuous positive airway pressure) dependence    H/O blood clots    Fatty liver    GERD (gastroesophageal reflux disease)    Hypercoagulable state ([address] )      TIA (transient ischemic attack)  Assessment & Plan  Patient presented with intermittent right hand numbness, right leg numbness  Loaded with plavix prior to transfer  Patient's LDL was 48 and hemoglobin A1c was 7 2  TA likely related to significant carotid stenosis  CT brain: No acute intracranial abnormality  MRI brain: No acute infarction  CT H/N: Severe stenosis of the left ICA approximately 1 2 centimeter distal to the carotid bifurcation secondary to atheromatous plaque  Minimal luminal diameter of 1 millimeter corresponding to approximately 70% stenosis  Echo with bubble study: EF 55-60% without any regional wall motion abnormalities, mild concentric hypertrophy, grade 1 diastolic dysfunction without any left-to-right shunt  VAS Doppler: RIGHT: There is <50% stenosis noted in the internal carotid artery  Plaque is heterogenous and irregular  LEFT: There is 70-99% stenosis noted in the internal carotid artery  Plaque is heterogenous and irregular  Plan:  · Aspirin 81 mg p o  Daily  · Continue Plavix 75 mg p o  Daily  · Underwent Neurology evaluation at [facility]  Would benefit from f/u with outpatient Neuro   · Vascular consulted  See L carotid artery stenosis plan      * Left carotid artery stenosis  Assessment & Plan  CT H/N: Severe stenosis of the left ICA approximately 1 2 centimeter distal to the carotid bifurcation secondary to atheromatous block-70% stenosis as per report  VAS Doppler: RIGHT: There is <50% stenosis noted in the internal carotid artery  Plaque is heterogenous and irregular  LEFT: There is 70-99% stenosis noted in the internal carotid artery  Plaque is heterogenous and irregular  Vascular suspects left ICA stenosis closer to 90%  Plan:  · Vascular surgery consulted  · S/p Left carotid endarterectomy 3/22/21  · Continue plavix 75 mg daily, ASA 81 mg daily, and atorvastatin 80 mg QD  · Weaned off cardene gtt today, increase losartan to 100 mg QD, increase amlodipine to 10 mg QD, add chlorthalidone 12 5 mg QD         Coronary artery disease  Assessment & Plan  Status post PCI x1 in 2017  Patient complaining of some exertional shortness of breath on presentation  Serial cardiac enzymes were negative  Echo showed EF of 50 for 60% without any regional wall motion abnormalities, grade 1 diastolic dysfunction without any right left shin  Plan:  · Continue metoprolol 25 mg daily    Diabetes mellitus, type 2 [facility])  Assessment & Plan  Lab Results   Component Value Date    HGBA1C 7 2 (H) 03/19/2021       Recent Labs     03/19/21  2033 03/20/21  0730 03/20/21  1130 03/20/21  1634   POCGLU 174* 134 282* 124       Blood Sugar Average: Last 72 hrs:     · Metformin has been on hold  · Humalog sliding scale with Accu-Cheks q a c  And hs  · Resume metformin as OP    Hypertensive urgency  Assessment & Plan  Patient's blood pressure initially in the ED was 226/96 and persistently elevated with systolic in 725F  BP controlled upon transfer to hospitals  Plan:  · Continue Toprol-XL 25 milligram p o  Daily  · Wean cardene gtt, increase losartan to 100 mg QD, increase amlodipine to 10 mg QD, add chlorthalidone 12 5 mg QD   · Will order hydralazine p r n  For SBP more than 170    Hyperlipidemia  Assessment & Plan  LDL level is 45   Patient was on lipitor 40 mg daily at home  Plan:  · Lipitor 80 mg p o  Daily      DETAILS OF HOSPITAL COURSE     Per Dr Donita Gutierrez Discharge Summary from [facility], "Kian Banegas is a 72 y o  male patient with past medical history of CAD, hypertension, hyperlipidemia, GERD, sleep apnea, diabetes, BPH  who originally presented to the hospital on 3/18/2021 due to intermittent right hand and arm numbness, right leg numbness with some weakness  CT scan of the brain was unremarkable  CT of the head and neck showed severe stenosis of the left internal carotid artery  Patient was seen in consult with Neurology and vascular surgery along with Cardiology  Patient was loaded with Plavix and was continued on Plavix  Patient later had MRI of the brain which was negative  Patient continued remained stable without any further neurological symptoms  Carotid Doppler showed close to 90% stenosis on the left  Discussed with vascular surgery who recommended the patient to be transferred to [facility] for carotid endarterectomy on March 22nd  Discussed in detail with patient and wife "    Endarterectomy on 3/22  Permissive HTN allowed through surgery  Started on high-dose atorvastatin, aspirin daily, and Plavix  After surgery, the patient required a nicardipine drip to control his BP  He eventually required losartan 100 mg daily, amlodipine 10 mg daily, and chlorthalidone 12 5 mg daily to wean off the Cardene drip  He will continue his medications as an outpatient and follow-up with his primary care doctor  The patient was seen examined day of discharge  He is resting comfortably in bed  Patient stated that he he did not get a restful night's sleep  He has had some anxiety while being in the hospital   Left neck pain is improving  He is tolerating his soft diet  Speech therapy signed off and instructed him to continue with a soft mechanical diet at home  Vascular has signed off as well    Blood pressure was well controlled prior to discharge 149/79  Expect continued improvement at home with current antihypertensive regimen  He will need to follow with his PCP within a week to 14 days for any further titration  His wife is an integral part of his care, and is aware of how to take a proper blood pressure and the parameters that would require medical attention  Physical Exam  Vitals signs and nursing note reviewed  Constitutional:       General: He is not in acute distress  Appearance: Normal appearance  HENT:      Head: Normocephalic and atraumatic  Right Ear: External ear normal       Left Ear: External ear normal       Nose: Nose normal       Mouth/Throat:      Mouth: Mucous membranes are dry  Eyes:      Extraocular Movements: Extraocular movements intact  Pupils: Pupils are equal, round, and reactive to light  Neck:      Comments: L carotid post-surgical changes  Cardiovascular:      Rate and Rhythm: Normal rate and regular rhythm  Pulses: Normal pulses  Heart sounds: No murmur  Pulmonary:      Effort: Pulmonary effort is normal       Breath sounds: Normal breath sounds  Abdominal:      General: Abdomen is flat  There is no distension  Palpations: Abdomen is soft  Tenderness: There is no abdominal tenderness  Musculoskeletal:      Right lower leg: No edema  Left lower leg: No edema  Skin:     General: Skin is warm and dry  Capillary Refill: Capillary refill takes less than 2 seconds  Neurological:      General: No focal deficit present  Mental Status: He is alert and oriented to person, place, and time     Psychiatric:         Mood and Affect: Mood normal          Behavior: Behavior normal          DISCHARGE INFORMATION     PCP at Discharge: Lakeshia Jhaveri MD    Admitting Provider: Johanny Colon MD  Admission Date: 3/20/2021    Discharge Provider: Johanny Colon MD  Discharge Date: 3/2521    Discharge Disposition: [address]  Discharge Condition: good  Discharge with Lines: no    Discharge Diet: cardiac diet and diabetic diet  Activity Restrictions: none  Test Results Pending at Discharge: None    Discharge Diagnoses:  Principal Problem:    Left carotid artery stenosis  Active Problems:    TIA (transient ischemic attack)    Coronary artery disease    Diabetes mellitus, type 2 (HCC)    Hypertensive urgency    Hyperlipidemia    Emanuel's esophagus    Sleep apnea    CPAP (continuous positive airway pressure) dependence    H/O blood clots    Fatty liver    GERD (gastroesophageal reflux disease)    Hypercoagulable state ([address] )  Resolved Problems:    * No resolved hospital problems  *      Consulting Providers:      Diagnostic & Therapeutic Procedures Performed:  No results found      Code Status: Level 1 - Full Code  Advance Directive & Living Will: Not Received  Power of :    POLST:      Medications:  Current Discharge Medication List        Current Discharge Medication List        Current Discharge Medication List      CONTINUE these medications which have NOT CHANGED    Details   ASPIRIN 81 PO Take 81 mg by mouth every morning       atorvastatin (LIPITOR) 40 mg tablet Take 40 mg by mouth daily at bedtime       Cholecalciferol (VITAMIN D) 2000 UNITS CAPS Take 2,000 Units by mouth daily at bedtime       lansoprazole (PREVACID) 30 mg capsule Take 30 mg by mouth every morning      losartan (COZAAR) 50 mg tablet Take 50 mg by mouth every morning      LUMIGAN 0 01 % ophthalmic drops Administer 1 drop to both eyes daily at bedtime       metFORMIN (GLUCOPHAGE-XR) 750 mg 24 hr tablet TAKE AS DIRECTED 3 TABLET DAILY  Refills: 3      metoprolol succinate (TOPROL-XL) 25 mg 24 hr tablet 25 mg every morning       fluticasone (FLONASE) 50 mcg/act nasal spray 2 sprays into each nostril as needed       GLUCOSAMINE CHONDROITIN COMPLX PO Take by mouth as needed None in 6 months             Allergies:  No Known Allergies    FOLLOW-UP     PCP Outpatient Follow-up:  Please follow-up with your PCP 7-14 days after discharge    Consulting Providers Follow-up:  Vascular Surgery    Active Issues Requiring Follow-up:   Carotid stenosis  HLD  HTN  T2DM    Discharge Statement:   I spent 30 minutes minutes discharging the patient  This time was spent on the day of discharge  I had direct contact with the patient on the day of discharge  Additional documentation is required if more than 30 minutes were spent on discharge  Portions of the record may have been created with voice recognition software  Occasional wrong word or "sound a like" substitutions may have occurred due to the inherent limitations of voice recognition software    Read the chart carefully and recognize, using context, where substitutions have occurred     ==  Lonn Severe, [address] [facility]  Internal Medicine Resident PGY-1

## 2021-03-25 NOTE — NURSING NOTE
Patient discharged via wheelchair with spouse and RN  [facility] questions answered by RN  Belonging and home medical equipment taken with patient

## 2021-03-25 NOTE — QUICK NOTE
Patient's wife, [facility], was called and updated  She is aware of how to take the patient's BP at home  All questions answered      Seth Elias DO, [address]  PGY-1, Internal Medicine  [facility]

## 2021-03-26 NOTE — UTILIZATION REVIEW
Notification of Discharge  This is a Notification of Discharge from our facility [address]  Please be advised that this patient has been discharge from our facility  Below you will find the admission and discharge date and time including the patients disposition  PRESENTATION DATE: 3/20/2021  8:42 PM  OBS ADMISSION DATE:   IP ADMISSION DATE: 3/20/21 2042   DISCHARGE DATE: 3/25/2021  5:39 PM  DISPOSITION: Home/Self Care Home/Self Care   Admission Orders listed below:  Admission Orders (From admission, onward)     Ordered        03/20/21 2058  Inpatient Admission  Once                   Please contact the UR Department if additional information is required to close this patient's authorization/case  [address] Utilization Review Department  Main: [phone] x carefully listen to the prompts  All voicemails are confidential   [email or web address]  Send all requests for admission clinical reviews, approved or denied determinations and any other requests to dedicated fax number below belonging to the campus where the patient is receiving treatment   List of dedicated fax numbers:  [address] DENIALS (Administrative/Medical Necessity) [phone]   [address] (Maternity/NICU/Pediatrics) [phone]   [facility] [phone]   [facility] [phone]   Jim Castro [phone]   [facility] [facility] [phone]   [facility]  [phone]   [address] [facility]  [address] [facility] [address] [facility] [phone]

## 2021-03-29 ENCOUNTER — HOSPITAL ENCOUNTER (EMERGENCY)
Facility: HOSPITAL | Age: 66
Discharge: HOME | End: 2021-03-29
Attending: EMERGENCY MEDICINE | Admitting: EMERGENCY MEDICINE
Payer: COMMERCIAL

## 2021-03-29 ENCOUNTER — APPOINTMENT (EMERGENCY)
Dept: CT IMAGING | Facility: HOSPITAL | Age: 66
End: 2021-03-29
Payer: COMMERCIAL

## 2021-03-29 ENCOUNTER — TELEPHONE (OUTPATIENT)
Dept: VASCULAR SURGERY | Facility: CLINIC | Age: 66
End: 2021-03-29

## 2021-03-29 ENCOUNTER — APPOINTMENT (EMERGENCY)
Dept: RADIOLOGY | Facility: HOSPITAL | Age: 66
End: 2021-03-29
Payer: COMMERCIAL

## 2021-03-29 VITALS
TEMPERATURE: 97.5 F | HEART RATE: 88 BPM | BODY MASS INDEX: 24.81 KG/M2 | RESPIRATION RATE: 18 BRPM | OXYGEN SATURATION: 100 % | DIASTOLIC BLOOD PRESSURE: 71 MMHG | WEIGHT: 168 LBS | SYSTOLIC BLOOD PRESSURE: 166 MMHG

## 2021-03-29 DIAGNOSIS — N17.9 AKI (ACUTE KIDNEY INJURY) (HCC): Primary | ICD-10-CM

## 2021-03-29 DIAGNOSIS — E86.0 DEHYDRATION: ICD-10-CM

## 2021-03-29 DIAGNOSIS — R06.02 SOB (SHORTNESS OF BREATH): ICD-10-CM

## 2021-03-29 LAB
ALBUMIN SERPL BCP-MCNC: 4.8 G/DL (ref 3.4–4.8)
ALP SERPL-CCNC: 64.2 U/L (ref 10–129)
ALT SERPL W P-5'-P-CCNC: 59 U/L (ref 5–63)
ANION GAP SERPL CALCULATED.3IONS-SCNC: 12 MMOL/L (ref 4–13)
ANION GAP SERPL CALCULATED.3IONS-SCNC: 14 MMOL/L (ref 4–13)
AST SERPL W P-5'-P-CCNC: 30 U/L (ref 15–41)
BASOPHILS # BLD AUTO: 0.03 THOUSANDS/ΜL (ref 0–0.1)
BASOPHILS NFR BLD AUTO: 0 % (ref 0–1)
BILIRUB SERPL-MCNC: 0.74 MG/DL (ref 0.3–1.2)
BILIRUB UR QL STRIP: NEGATIVE
BUN SERPL-MCNC: 29 MG/DL (ref 6–20)
BUN SERPL-MCNC: 31 MG/DL (ref 6–20)
CALCIUM SERPL-MCNC: 10.7 MG/DL (ref 8.4–10.2)
CALCIUM SERPL-MCNC: 9.3 MG/DL (ref 8.4–10.2)
CHLORIDE SERPL-SCNC: 93 MMOL/L (ref 96–108)
CHLORIDE SERPL-SCNC: 97 MMOL/L (ref 96–108)
CLARITY UR: CLEAR
CO2 SERPL-SCNC: 25 MMOL/L (ref 22–33)
CO2 SERPL-SCNC: 27 MMOL/L (ref 22–33)
COLOR UR: YELLOW
CREAT SERPL-MCNC: 1.34 MG/DL (ref 0.5–1.2)
CREAT SERPL-MCNC: 1.51 MG/DL (ref 0.5–1.2)
D DIMER PPP FEU-MCNC: 1.53 MG/L FEU (ref 0.19–0.49)
EOSINOPHIL # BLD AUTO: 0.28 THOUSAND/ΜL (ref 0–0.61)
EOSINOPHIL NFR BLD AUTO: 3 % (ref 0–6)
ERYTHROCYTE [DISTWIDTH] IN BLOOD BY AUTOMATED COUNT: 13.5 % (ref 11.6–15.1)
FLUAV RNA RESP QL NAA+PROBE: NEGATIVE
FLUBV RNA RESP QL NAA+PROBE: NEGATIVE
GFR SERPL CREATININE-BSD FRML MDRD: 48 ML/MIN/1.73SQ M
GFR SERPL CREATININE-BSD FRML MDRD: 55 ML/MIN/1.73SQ M
GLUCOSE SERPL-MCNC: 175 MG/DL (ref 65–140)
GLUCOSE SERPL-MCNC: 183 MG/DL (ref 65–140)
GLUCOSE UR STRIP-MCNC: ABNORMAL MG/DL
HCT VFR BLD AUTO: 44.2 % (ref 36.5–49.3)
HGB BLD-MCNC: 14.6 G/DL (ref 12–17)
HGB UR QL STRIP.AUTO: NEGATIVE
IMM GRANULOCYTES # BLD AUTO: 0.04 THOUSAND/UL (ref 0–0.2)
IMM GRANULOCYTES NFR BLD AUTO: 0 % (ref 0–2)
KETONES UR STRIP-MCNC: NEGATIVE MG/DL
LACTATE SERPL-SCNC: 2 MMOL/L (ref 0–2)
LACTATE SERPL-SCNC: 2.5 MMOL/L (ref 0–2)
LEUKOCYTE ESTERASE UR QL STRIP: NEGATIVE
LYMPHOCYTES # BLD AUTO: 2.59 THOUSANDS/ΜL (ref 0.6–4.47)
LYMPHOCYTES NFR BLD AUTO: 24 % (ref 14–44)
MCH RBC QN AUTO: 28.5 PG (ref 26.8–34.3)
MCHC RBC AUTO-ENTMCNC: 33 G/DL (ref 31.4–37.4)
MCV RBC AUTO: 86 FL (ref 82–98)
MONOCYTES # BLD AUTO: 1.12 THOUSAND/ΜL (ref 0.17–1.22)
MONOCYTES NFR BLD AUTO: 10 % (ref 4–12)
NEUTROPHILS # BLD AUTO: 6.71 THOUSANDS/ΜL (ref 1.85–7.62)
NEUTS SEG NFR BLD AUTO: 63 % (ref 43–75)
NITRITE UR QL STRIP: NEGATIVE
PH UR STRIP.AUTO: 5 [PH]
PLATELET # BLD AUTO: 262 THOUSANDS/UL (ref 149–390)
PMV BLD AUTO: 12.7 FL (ref 8.9–12.7)
POTASSIUM SERPL-SCNC: 3.3 MMOL/L (ref 3.5–5)
POTASSIUM SERPL-SCNC: 3.9 MMOL/L (ref 3.5–5)
PROT SERPL-MCNC: 8.7 G/DL (ref 6.4–8.3)
PROT UR STRIP-MCNC: NEGATIVE MG/DL
RBC # BLD AUTO: 5.12 MILLION/UL (ref 3.88–5.62)
RSV RNA RESP QL NAA+PROBE: NEGATIVE
SARS-COV-2 RNA RESP QL NAA+PROBE: NEGATIVE
SODIUM SERPL-SCNC: 134 MMOL/L (ref 133–145)
SODIUM SERPL-SCNC: 134 MMOL/L (ref 133–145)
SP GR UR STRIP.AUTO: 1.01 (ref 1–1.03)
TROPONIN I SERPL-MCNC: <0.03 NG/ML (ref 0–0.07)
TSH SERPL DL<=0.05 MIU/L-ACNC: 1.43 UIU/ML (ref 0.34–5.6)
UROBILINOGEN UR QL STRIP.AUTO: 0.2 E.U./DL
WBC # BLD AUTO: 10.77 THOUSAND/UL (ref 4.31–10.16)

## 2021-03-29 PROCEDURE — 85379 FIBRIN DEGRADATION QUANT: CPT | Performed by: PHYSICIAN ASSISTANT

## 2021-03-29 PROCEDURE — G1004 CDSM NDSC: HCPCS

## 2021-03-29 PROCEDURE — 0241U HB NFCT DS VIR RESP RNA 4 TRGT: CPT | Performed by: PHYSICIAN ASSISTANT

## 2021-03-29 PROCEDURE — 83605 ASSAY OF LACTIC ACID: CPT | Performed by: PHYSICIAN ASSISTANT

## 2021-03-29 PROCEDURE — 84484 ASSAY OF TROPONIN QUANT: CPT | Performed by: PHYSICIAN ASSISTANT

## 2021-03-29 PROCEDURE — 96360 HYDRATION IV INFUSION INIT: CPT

## 2021-03-29 PROCEDURE — 71045 X-RAY EXAM CHEST 1 VIEW: CPT

## 2021-03-29 PROCEDURE — 81003 URINALYSIS AUTO W/O SCOPE: CPT | Performed by: PHYSICIAN ASSISTANT

## 2021-03-29 PROCEDURE — 99285 EMERGENCY DEPT VISIT HI MDM: CPT

## 2021-03-29 PROCEDURE — 80048 BASIC METABOLIC PNL TOTAL CA: CPT | Performed by: PHYSICIAN ASSISTANT

## 2021-03-29 PROCEDURE — 85025 COMPLETE CBC W/AUTO DIFF WBC: CPT | Performed by: PHYSICIAN ASSISTANT

## 2021-03-29 PROCEDURE — 99285 EMERGENCY DEPT VISIT HI MDM: CPT | Performed by: PHYSICIAN ASSISTANT

## 2021-03-29 PROCEDURE — 96361 HYDRATE IV INFUSION ADD-ON: CPT

## 2021-03-29 PROCEDURE — 80053 COMPREHEN METABOLIC PANEL: CPT | Performed by: PHYSICIAN ASSISTANT

## 2021-03-29 PROCEDURE — 71275 CT ANGIOGRAPHY CHEST: CPT

## 2021-03-29 PROCEDURE — 84443 ASSAY THYROID STIM HORMONE: CPT | Performed by: PHYSICIAN ASSISTANT

## 2021-03-29 PROCEDURE — 93005 ELECTROCARDIOGRAM TRACING: CPT

## 2021-03-29 PROCEDURE — 36415 COLL VENOUS BLD VENIPUNCTURE: CPT | Performed by: PHYSICIAN ASSISTANT

## 2021-03-29 RX ORDER — POTASSIUM CHLORIDE 20 MEQ/1
40 TABLET, EXTENDED RELEASE ORAL ONCE
Status: COMPLETED | OUTPATIENT
Start: 2021-03-29 | End: 2021-03-29

## 2021-03-29 RX ORDER — EMPAGLIFLOZIN 10 MG/1
25 TABLET, FILM COATED ORAL DAILY
COMMUNITY
Start: 2021-03-26

## 2021-03-29 RX ADMIN — IOHEXOL 85 ML: 350 INJECTION, SOLUTION INTRAVENOUS at 13:13

## 2021-03-29 RX ADMIN — POTASSIUM CHLORIDE 40 MEQ: 1500 TABLET, EXTENDED RELEASE ORAL at 15:46

## 2021-03-29 RX ADMIN — SODIUM CHLORIDE 1000 ML: 0.9 INJECTION, SOLUTION INTRAVENOUS at 14:21

## 2021-03-29 NOTE — TELEPHONE ENCOUNTER
Vascular Nurse Navigator Post Op Call    Procedure: ENDARTERECTOMY ARTERY CAROTID (Left)    Date of Procedure: 3/22/21     Surgeon: Dr Galo Haq    Discharge Date: 3/25/21    Discharge Disposition: home    Change in Vision?: No    Change in Speech?: low voice due to hoarseness and shortness of breath    Weakness?: No    Uncontrolled Pain?: No    Hoarseness?: Yes    Trouble Swallowing?: No    Incision Concerns?: No    Anticoagulation?: Aspirin and Clopidogrel (Plavix)    Bleeding?: No      NEXT OFFICE VISIT SCHEDULED: 4/7/21    Vascular:   Transportation Available?: Yes        Any further questions/concerns? Called pt for post-op call and he sounded very hoarse and out of breath  Spoke w/ pt's wife who states he is still still very hoarse and he has been having some shortness of breath since the surgery  He has an appointment w/ his PCP today at 4:30 for evaluation  Informed pt's wife I would send this to our triage provider as well
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
## 2021-03-29 NOTE — TELEPHONE ENCOUNTER
If patient is having new or worsening shortness of breath and is having difficulty breathing he should be evaluated in the ED  If this is chronic and has not worsened and he is minimally symptomatic he can wait to be seen by PCP however I would suggest ED evaluation

## 2021-03-29 NOTE — ED NOTES
Patient resting on stretcher with even and unlabored respirations  Patient aware CTA resulted and provider will update patient on plan of care        Mayra Giron RN  03/29/21 9032

## 2021-03-30 ENCOUNTER — TELEPHONE (OUTPATIENT)
Dept: NEUROLOGY | Facility: CLINIC | Age: 66
End: 2021-03-30

## 2021-03-30 ENCOUNTER — TRANSCRIBE ORDERS (OUTPATIENT)
Dept: ADMINISTRATIVE | Facility: HOSPITAL | Age: 66
End: 2021-03-30

## 2021-03-30 DIAGNOSIS — T88.9XXA COMPLICATION OF SURGICAL AND MEDICAL CARE, UNSPECIFIED, INITIAL ENCOUNTER: Primary | ICD-10-CM

## 2021-03-30 LAB
ATRIAL RATE: 83 BPM
P AXIS: 64 DEGREES
PR INTERVAL: 139 MS
QRS AXIS: 42 DEGREES
QRSD INTERVAL: 145 MS
QT INTERVAL: 434 MS
QTC INTERVAL: 510 MS
T WAVE AXIS: 22 DEGREES
VENTRICULAR RATE: 83 BPM

## 2021-03-30 PROCEDURE — 93010 ELECTROCARDIOGRAM REPORT: CPT | Performed by: INTERNAL MEDICINE

## 2021-03-30 NOTE — TELEPHONE ENCOUNTER
Spoke to pt, he doesn't want to schedule an HFU appt  I provided my name and number in case they change their mind  SLW/Numbness/[employer]    NOTE FROM CHART:  Chief Complaint   Patient presents with    Numbness   Parvez Rome will need follow up in 2-3 months with general attending or advance practitioner  He will not require outpatient neurological testing

## 2021-03-31 ENCOUNTER — HOSPITAL ENCOUNTER (OUTPATIENT)
Dept: CT IMAGING | Facility: HOSPITAL | Age: 66
Discharge: HOME | End: 2021-03-31
Attending: INTERNAL MEDICINE
Payer: COMMERCIAL

## 2021-03-31 ENCOUNTER — TELEPHONE (OUTPATIENT)
Dept: NEUROLOGY | Facility: CLINIC | Age: 66
End: 2021-03-31

## 2021-03-31 DIAGNOSIS — T88.9XXA COMPLICATION OF SURGICAL AND MEDICAL CARE, UNSPECIFIED, INITIAL ENCOUNTER: ICD-10-CM

## 2021-03-31 PROCEDURE — 70450 CT HEAD/BRAIN W/O DYE: CPT

## 2021-03-31 NOTE — TELEPHONE ENCOUNTER
Post TIA Discharge Follow Up    Hospitalization: 3/20-3/25  The purpose of this phone call is to assess patient's general wellbeing or for any assistance needed with follow-up care  Called, reached patient and his wife Howard Rivas on speaker phone, I introduced myself and explained neurovascular nurse navigator role  Since discharge, he denies experiencing any new or worsening stroke-like symptoms  Patient denies the presence of any residual stroke symptoms following hospitalization and claims he has returned to baseline functioning  States he is coughing still and lost his voice  States he is having frequent urination as well throughout the evening and is following up with PCP for this  Ambulation / ADLs:  he is ambulating independently as well as preforming his own ADLs  Wife manages his medications, appointments, and affairs  Appointments / Medication Review:  Reviewed appointments - patient successfully followed up with PCP yesterday, (out of network provider)  Patient scheduled with vascular 4/7  Patient and wife continue to decline scheduling with neurology  I tried to review medications with them but wife declined to do so with me  States "things are changing with his primary care doctor and I don't want to get into that right now"  Risk Factors / Education:  During this call, we reviewed stroke type, symptoms, personal risk factors and management, medications, and resources  They verbalize understanding  Offered to send stroke education binder and my card in the mail, they are agreeable to this  I mailed the information as requested  As for risk factors, patient reports they have been managing modifiable risk factors in the the following ways:   BP is monitored at home, Reported average BP continues to be 147/83, he is a non smoker, trying to follow a healthier diet  I addressed all their questions   At the conclusion of the conversation, patient and wife deny having any further questions or concerns

## 2021-04-01 ENCOUNTER — APPOINTMENT (OUTPATIENT)
Dept: LAB | Facility: CLINIC | Age: 66
End: 2021-04-01
Payer: COMMERCIAL

## 2021-04-01 ENCOUNTER — TRANSCRIBE ORDERS (OUTPATIENT)
Dept: LAB | Facility: CLINIC | Age: 66
End: 2021-04-01

## 2021-04-01 DIAGNOSIS — I16.0 HYPERTENSIVE URGENCY: ICD-10-CM

## 2021-04-01 DIAGNOSIS — S37.10XS: Primary | ICD-10-CM

## 2021-04-01 DIAGNOSIS — S37.009S: Primary | ICD-10-CM

## 2021-04-01 LAB
ANION GAP SERPL CALCULATED.3IONS-SCNC: 8 MMOL/L (ref 4–13)
BUN SERPL-MCNC: 29 MG/DL (ref 5–25)
CALCIUM SERPL-MCNC: 10.4 MG/DL (ref 8.3–10.1)
CHLORIDE SERPL-SCNC: 100 MMOL/L (ref 100–108)
CO2 SERPL-SCNC: 27 MMOL/L (ref 21–32)
CREAT SERPL-MCNC: 1.47 MG/DL (ref 0.6–1.3)
GFR SERPL CREATININE-BSD FRML MDRD: 49 ML/MIN/1.73SQ M
GLUCOSE P FAST SERPL-MCNC: 210 MG/DL (ref 65–99)
POTASSIUM SERPL-SCNC: 3.7 MMOL/L (ref 3.5–5.3)
SODIUM SERPL-SCNC: 135 MMOL/L (ref 136–145)

## 2021-04-01 PROCEDURE — 36415 COLL VENOUS BLD VENIPUNCTURE: CPT

## 2021-04-01 PROCEDURE — 80048 BASIC METABOLIC PNL TOTAL CA: CPT

## 2021-04-02 ENCOUNTER — TELEPHONE (OUTPATIENT)
Dept: VASCULAR SURGERY | Facility: CLINIC | Age: 66
End: 2021-04-02

## 2021-04-02 DIAGNOSIS — I65.23 CAROTID STENOSIS, BILATERAL: Primary | ICD-10-CM

## 2021-04-02 NOTE — TELEPHONE ENCOUNTER
Spoke with patient  Scheduled 3 month carotid doppler and 9 month carotid doppler  Provided patient with call back number ([phone] option [phone], should they need to reschedule  Surgeon - Gayathri Keys (NPI: [id number])  Date of Surgery - 03/22/2021  All appointments must be scheduled by, 6/21/2022  !!!! Patient must be scheduled for their 9-month office visit before the follow-up window close date !!!!    AKUA/ CEA VQI Protocol  patients must be scheduled for the following    [] 3-month Doppler - scheduled for 06/24/2021    [] 9-month Doppler Expected - scheduled for 12/27/2021    [x] 9-month OV after Doppler - due on or after 12/27/2021 recall placed          Scheduling Reminders  1  Insurance requires, 9-month doppler to be scheduled 6-months and 2-days after the 3-month doppler  2  Appointment notes MUST be entered in the following format:  a  VQI FORMS NEEDED VQI LTFU (AKUA or CEA) (Date of Surgery) CV Duplex (Date of Doppler) (Location of Doppler)  3  If unable to schedule, a recall MUST be entered  >>Please route this encounter to [facility], [place], and Nicole Camara  <<

## 2021-04-07 ENCOUNTER — TELEPHONE (OUTPATIENT)
Dept: VASCULAR SURGERY | Facility: CLINIC | Age: 66
End: 2021-04-07

## 2021-04-07 ENCOUNTER — OFFICE VISIT (OUTPATIENT)
Dept: VASCULAR SURGERY | Facility: CLINIC | Age: 66
End: 2021-04-07

## 2021-04-07 VITALS
SYSTOLIC BLOOD PRESSURE: 160 MMHG | WEIGHT: 165.34 LBS | HEIGHT: 69 IN | HEART RATE: 87 BPM | TEMPERATURE: 98.2 F | RESPIRATION RATE: 17 BRPM | BODY MASS INDEX: 24.49 KG/M2 | DIASTOLIC BLOOD PRESSURE: 80 MMHG

## 2021-04-07 DIAGNOSIS — I65.22 SYMPTOMATIC STENOSIS OF LEFT CAROTID ARTERY: ICD-10-CM

## 2021-04-07 DIAGNOSIS — E78.5 HYPERLIPIDEMIA, UNSPECIFIED HYPERLIPIDEMIA TYPE: ICD-10-CM

## 2021-04-07 DIAGNOSIS — T14.8XXA NEURAPRAXIA: Primary | ICD-10-CM

## 2021-04-07 PROCEDURE — 99024 POSTOP FOLLOW-UP VISIT: CPT | Performed by: SURGERY

## 2021-04-07 RX ORDER — AZELASTINE HCL 205.5 UG/1
SPRAY NASAL
COMMUNITY
Start: 2021-04-06 | End: 2022-03-30

## 2021-04-07 RX ORDER — LOSARTAN POTASSIUM 50 MG/1
50 TABLET ORAL 2 TIMES DAILY
COMMUNITY
Start: 2021-04-02

## 2021-04-07 NOTE — PROGRESS NOTES
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  Patient has difficulty with swallowing and speech  Patient states at an outside ENT office he was told he has vocal cord paralysis  Will refer to our ENT specialist for further evaluation and treatment  Patient to continue 3 month postprocedure course of clopidogrel then discontinue  Patient to remain on lifelong aspirin and statin therapy  Routine surveillance carotid duplex in 3 months  Diagnoses and all orders for this visit:    Neurapraxia  -     Ambulatory Referral to Otolaryngology; Future    Hyperlipidemia, unspecified hyperlipidemia type    Symptomatic stenosis of left carotid artery    Other orders  -     losartan (COZAAR) 50 mg tablet; Take 50 mg by mouth daily  -     Azelastine HCl 0 15 % SOLN          Subjective:      Patient ID: Girma Coombs is a 72 y o  male  Patient presents in office for post visit after complex LCEA for symptomatic carotid stenosis on 03/22/2021  At the time of surgery patient was noted to have a lesion which extended well beyond the carotid bifurcation requiring distal dissection and significant cephalad retraction  Postoperatively patient was noted to have difficulty with swallowing and voice  There is likely a vagal nerve neurapraxia  Discussed this with patient  Also discussed with patient  My recommendation to follow-up with ENT  Patient also complains of numbness at the incision site extending to the lower jaw  I have explained to the patient this is normal and will likely improve with time  Patient is currently taking ASA 81, Atorvastatin and Plavix  Patient will complete his 3 month course of Plavix post-procedure that continue on lifelong aspirin statin therapy      Imaging:CTA 3/18/21  Severe stenosis left ICA approximately 1 2 cm distal to the carotid bifurcation secondary to atheromatous plaque  The minimal luminal diameter is 1 mm corresponding to approximately 70% stenosis by NASCET criteria although this is likely underestimated   as the distal ICA is a small caliber vessel  The following portions of the patient's history were reviewed and updated as appropriate: allergies, current medications, past family history, past medical history, past social history, past surgical history and problem list     Review of Systems   Constitutional: Negative  HENT: Positive for trouble swallowing  Eyes: Positive for visual disturbance (Left eye blur)  Respiratory: Negative  Cardiovascular: Negative  Gastrointestinal: Negative  Endocrine: Negative  Genitourinary: Positive for urgency  Musculoskeletal: Negative  Skin: Negative  Allergic/Immunologic: Negative  Neurological: Positive for speech difficulty and numbness  Hematological: Bruises/bleeds easily  Psychiatric/Behavioral: Negative  Objective:      /80 (BP Location: Right arm, Patient Position: Sitting, Cuff Size: Adult)   Pulse 87   Temp 98 2 °F (36 8 °C) (Tympanic)   Resp 17   Ht 5' 9" (1 753 m)   Wt 75 kg (165 lb 5 5 oz)   BMI 24 42 kg/m²          Physical Exam  Vitals signs and nursing note reviewed  Constitutional:       Appearance: He is well-developed  HENT:      Head: Normocephalic and atraumatic  Mouth/Throat:      Tongue: No lesions  Tongue does not deviate from midline  Eyes:      Conjunctiva/sclera: Conjunctivae normal       Pupils: Pupils are equal, round, and reactive to light  Neck:      Musculoskeletal: Normal range of motion and neck supple  Vascular: No JVD  Comments: Left cervical incision healing well  No evidence of infection  Pulmonary:      Effort: No respiratory distress  Breath sounds: No stridor  No wheezing or rales  Chest:      Chest wall: No tenderness     Abdominal:      General: There is no distension  Palpations: There is no mass  Tenderness: There is no abdominal tenderness  There is no guarding or rebound  Musculoskeletal: Normal range of motion  General: No tenderness or deformity  Skin:     General: Skin is warm and dry  Findings: No erythema or rash  Neurological:      Mental Status: He is alert and oriented to person, place, and time  Psychiatric:         Behavior: Behavior normal          Thought Content:  Thought content normal

## 2021-04-07 NOTE — TELEPHONE ENCOUNTER
Patient was given an ambulatory referral to Otolaryngology  I called Dr J Carlos Nuñez office, [phone], option 5, Mani Bullion  She will discuss this case with Dr Allie Ritchie today and call the patient back today or tomorrow to set up an appointment  The patient is aware of this and was also provided the contact information for Dr J Carlos Nuñez office

## 2021-04-07 NOTE — LETTER
April 7, 2021     Radha Garnica MD  [address] [place] [id number]    Patient: Rachel Godinez   YOB: 1955   Date of Visit: 4/7/2021       Dear Dr Court Gowers: Thank you for referring Cheryle Railing to me for evaluation  Below are the relevant portions of my assessment and plan of care  Patient presents in office for post visit after complex LCEA for symptomatic carotid stenosis on 03/22/2021  At the time of surgery patient was noted to have a lesion which extended well beyond the carotid bifurcation requiring distal dissection and significant cephalad retraction  Postoperatively patient was noted to have difficulty with swallowing and voice  There is likely a vagal nerve neurapraxia  Discussed this with patient  Also discussed with patient  My recommendation to follow-up with ENT  Patient also complains of numbness at the incision site extending to the lower jaw  I have explained to the patient this is normal and will likely improve with time  Patient is currently taking ASA 81, Atorvastatin and Plavix  Patient will complete his 3 month course of Plavix post-procedure that continue on lifelong aspirin statin therapy  Imaging:CTA 3/18/21  Severe stenosis left ICA approximately 1 2 cm distal to the carotid bifurcation secondary to atheromatous plaque  The minimal luminal diameter is 1 mm corresponding to approximately 70% stenosis by NASCET criteria although this is likely underestimated as the distal ICA is a small caliber vessel  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

## 2021-04-07 NOTE — ASSESSMENT & PLAN NOTE
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

## 2021-04-09 ENCOUNTER — APPOINTMENT (OUTPATIENT)
Dept: LAB | Facility: CLINIC | Age: 66
End: 2021-04-09
Payer: COMMERCIAL

## 2021-04-09 ENCOUNTER — TRANSCRIBE ORDERS (OUTPATIENT)
Dept: LAB | Facility: CLINIC | Age: 66
End: 2021-04-09

## 2021-04-09 DIAGNOSIS — S37.009S: ICD-10-CM

## 2021-04-09 DIAGNOSIS — Z12.5 SPECIAL SCREENING, PROSTATE CANCER: Primary | ICD-10-CM

## 2021-04-09 DIAGNOSIS — E83.52 HYPERCALCEMIA: ICD-10-CM

## 2021-04-09 DIAGNOSIS — I10 ESSENTIAL HYPERTENSION, MALIGNANT: ICD-10-CM

## 2021-04-09 DIAGNOSIS — Z12.5 SPECIAL SCREENING, PROSTATE CANCER: ICD-10-CM

## 2021-04-09 DIAGNOSIS — S37.10XS: ICD-10-CM

## 2021-04-09 LAB
ANION GAP SERPL CALCULATED.3IONS-SCNC: 10 MMOL/L (ref 4–13)
BUN SERPL-MCNC: 24 MG/DL (ref 5–25)
CALCIUM SERPL-MCNC: 9.8 MG/DL (ref 8.3–10.1)
CHLORIDE SERPL-SCNC: 101 MMOL/L (ref 100–108)
CO2 SERPL-SCNC: 26 MMOL/L (ref 21–32)
CREAT SERPL-MCNC: 1.39 MG/DL (ref 0.6–1.3)
GFR SERPL CREATININE-BSD FRML MDRD: 53 ML/MIN/1.73SQ M
GLUCOSE P FAST SERPL-MCNC: 128 MG/DL (ref 65–99)
POTASSIUM SERPL-SCNC: 4.2 MMOL/L (ref 3.5–5.3)
PSA SERPL-MCNC: 4.3 NG/ML (ref 0–4)
PTH-INTACT SERPL-MCNC: 40.2 PG/ML (ref 18.4–80.1)
SODIUM SERPL-SCNC: 137 MMOL/L (ref 136–145)

## 2021-04-09 PROCEDURE — 36415 COLL VENOUS BLD VENIPUNCTURE: CPT

## 2021-04-09 PROCEDURE — 80048 BASIC METABOLIC PNL TOTAL CA: CPT

## 2021-04-09 PROCEDURE — G0103 PSA SCREENING: HCPCS

## 2021-04-09 PROCEDURE — 83970 ASSAY OF PARATHORMONE: CPT

## 2021-04-15 ENCOUNTER — TELEPHONE (OUTPATIENT)
Dept: OTHER | Facility: OTHER | Age: 66
End: 2021-04-15

## 2021-04-16 NOTE — TELEPHONE ENCOUNTER
Called patient this morning and per his wife, Dr Audrey Wood did call patient last nite and spoke to them

## 2021-05-04 PROBLEM — J38.01 PARALYSIS OF LEFT VOCAL FOLD: Status: ACTIVE | Noted: 2021-05-04

## 2021-05-04 PROBLEM — Z98.890 HISTORY OF CEA (CAROTID ENDARTERECTOMY): Status: ACTIVE | Noted: 2021-05-04

## 2021-05-04 PROBLEM — R13.14 PHARYNGOESOPHAGEAL DYSPHAGIA: Status: ACTIVE | Noted: 2021-05-04

## 2021-05-04 PROBLEM — R49.0 DYSPHONIA: Status: ACTIVE | Noted: 2021-05-04

## 2021-05-04 PROBLEM — R49.0 MUSCLE TENSION DYSPHONIA: Status: ACTIVE | Noted: 2021-05-04

## 2021-05-04 PROBLEM — R68.84 JAW PAIN: Status: ACTIVE | Noted: 2021-05-04

## 2021-05-04 PROBLEM — J38.3 GLOTTIC INSUFFICIENCY: Status: ACTIVE | Noted: 2021-05-04

## 2021-06-09 ENCOUNTER — TRANSCRIBE ORDERS (OUTPATIENT)
Dept: LAB | Facility: CLINIC | Age: 66
End: 2021-06-09

## 2021-06-09 ENCOUNTER — APPOINTMENT (OUTPATIENT)
Dept: LAB | Facility: CLINIC | Age: 66
End: 2021-06-09
Payer: COMMERCIAL

## 2021-06-09 DIAGNOSIS — I10 ESSENTIAL HYPERTENSION, BENIGN: ICD-10-CM

## 2021-06-09 DIAGNOSIS — E78.49 FAMILIAL COMBINED HYPERLIPIDEMIA: ICD-10-CM

## 2021-06-09 DIAGNOSIS — N40.0 ENLARGED PROSTATE: ICD-10-CM

## 2021-06-09 DIAGNOSIS — E11.00 TYPE II DIABETES MELLITUS WITH HYPEROSMOLARITY, UNCONTROLLED (HCC): Primary | ICD-10-CM

## 2021-06-09 DIAGNOSIS — E55.9 AVITAMINOSIS D: ICD-10-CM

## 2021-06-09 DIAGNOSIS — E11.65 TYPE II DIABETES MELLITUS WITH HYPEROSMOLARITY, UNCONTROLLED (HCC): Primary | ICD-10-CM

## 2021-06-09 LAB
25(OH)D3 SERPL-MCNC: 50 NG/ML (ref 30–100)
ALBUMIN SERPL BCP-MCNC: 3.8 G/DL (ref 3.5–5)
ALP SERPL-CCNC: 62 U/L (ref 46–116)
ALT SERPL W P-5'-P-CCNC: 34 U/L (ref 12–78)
ANION GAP SERPL CALCULATED.3IONS-SCNC: 5 MMOL/L (ref 4–13)
AST SERPL W P-5'-P-CCNC: 22 U/L (ref 5–45)
BASOPHILS # BLD AUTO: 0.06 THOUSANDS/ΜL (ref 0–0.1)
BASOPHILS NFR BLD AUTO: 1 % (ref 0–1)
BILIRUB SERPL-MCNC: 0.54 MG/DL (ref 0.2–1)
BUN SERPL-MCNC: 20 MG/DL (ref 5–25)
CALCIUM SERPL-MCNC: 9.6 MG/DL (ref 8.3–10.1)
CHLORIDE SERPL-SCNC: 108 MMOL/L (ref 100–108)
CO2 SERPL-SCNC: 28 MMOL/L (ref 21–32)
CREAT SERPL-MCNC: 1.19 MG/DL (ref 0.6–1.3)
EOSINOPHIL # BLD AUTO: 0.33 THOUSAND/ΜL (ref 0–0.61)
EOSINOPHIL NFR BLD AUTO: 4 % (ref 0–6)
ERYTHROCYTE [DISTWIDTH] IN BLOOD BY AUTOMATED COUNT: 13.5 % (ref 11.6–15.1)
EST. AVERAGE GLUCOSE BLD GHB EST-MCNC: 146 MG/DL
GFR SERPL CREATININE-BSD FRML MDRD: 64 ML/MIN/1.73SQ M
GLUCOSE P FAST SERPL-MCNC: 134 MG/DL (ref 65–99)
HBA1C MFR BLD: 6.7 %
HCT VFR BLD AUTO: 42.2 % (ref 36.5–49.3)
HGB BLD-MCNC: 13.2 G/DL (ref 12–17)
IMM GRANULOCYTES # BLD AUTO: 0.03 THOUSAND/UL (ref 0–0.2)
IMM GRANULOCYTES NFR BLD AUTO: 0 % (ref 0–2)
LYMPHOCYTES # BLD AUTO: 1.68 THOUSANDS/ΜL (ref 0.6–4.47)
LYMPHOCYTES NFR BLD AUTO: 20 % (ref 14–44)
MCH RBC QN AUTO: 28.8 PG (ref 26.8–34.3)
MCHC RBC AUTO-ENTMCNC: 31.3 G/DL (ref 31.4–37.4)
MCV RBC AUTO: 92 FL (ref 82–98)
MONOCYTES # BLD AUTO: 0.74 THOUSAND/ΜL (ref 0.17–1.22)
MONOCYTES NFR BLD AUTO: 9 % (ref 4–12)
NEUTROPHILS # BLD AUTO: 5.6 THOUSANDS/ΜL (ref 1.85–7.62)
NEUTS SEG NFR BLD AUTO: 66 % (ref 43–75)
NRBC BLD AUTO-RTO: 0 /100 WBCS
PLATELET # BLD AUTO: 219 THOUSANDS/UL (ref 149–390)
PMV BLD AUTO: 12.8 FL (ref 8.9–12.7)
POTASSIUM SERPL-SCNC: 3.9 MMOL/L (ref 3.5–5.3)
PROT SERPL-MCNC: 7.6 G/DL (ref 6.4–8.2)
RBC # BLD AUTO: 4.58 MILLION/UL (ref 3.88–5.62)
SODIUM SERPL-SCNC: 141 MMOL/L (ref 136–145)
WBC # BLD AUTO: 8.44 THOUSAND/UL (ref 4.31–10.16)

## 2021-06-09 PROCEDURE — 36415 COLL VENOUS BLD VENIPUNCTURE: CPT

## 2021-06-09 PROCEDURE — 80053 COMPREHEN METABOLIC PANEL: CPT

## 2021-06-09 PROCEDURE — 82306 VITAMIN D 25 HYDROXY: CPT

## 2021-06-09 PROCEDURE — 85025 COMPLETE CBC W/AUTO DIFF WBC: CPT

## 2021-06-09 PROCEDURE — 83036 HEMOGLOBIN GLYCOSYLATED A1C: CPT

## 2021-06-24 ENCOUNTER — HOSPITAL ENCOUNTER (OUTPATIENT)
Dept: RADIOLOGY | Facility: HOSPITAL | Age: 66
Discharge: HOME | End: 2021-06-24
Attending: SURGERY
Payer: COMMERCIAL

## 2021-06-24 DIAGNOSIS — I65.23 CAROTID STENOSIS, BILATERAL: ICD-10-CM

## 2021-06-24 PROCEDURE — 93880 EXTRACRANIAL BILAT STUDY: CPT

## 2021-06-24 PROCEDURE — NC001 PR NO CHARGE: Performed by: SURGERY

## 2021-06-25 PROCEDURE — 93880 EXTRACRANIAL BILAT STUDY: CPT | Performed by: SURGERY

## 2021-06-28 ENCOUNTER — HOSPITAL ENCOUNTER (OUTPATIENT)
Dept: RADIOLOGY | Facility: HOSPITAL | Age: 66
Discharge: HOME | End: 2021-06-28
Payer: COMMERCIAL

## 2021-06-28 DIAGNOSIS — R63.0 LOSS OF APPETITE: ICD-10-CM

## 2021-06-28 DIAGNOSIS — R63.4 WEIGHT LOSS: ICD-10-CM

## 2021-06-28 PROCEDURE — 76700 US EXAM ABDOM COMPLETE: CPT

## 2021-07-19 DIAGNOSIS — E78.5 HYPERLIPIDEMIA: ICD-10-CM

## 2021-07-19 RX ORDER — ATORVASTATIN CALCIUM 80 MG/1
TABLET, FILM COATED ORAL
Qty: 90 TABLET | Refills: 0 | Status: SHIPPED | OUTPATIENT
Start: 2021-07-19

## 2021-07-23 ENCOUNTER — TELEPHONE (OUTPATIENT)
Dept: GASTROENTEROLOGY | Facility: CLINIC | Age: 66
End: 2021-07-23

## 2021-07-23 NOTE — TELEPHONE ENCOUNTER
Wife called to find out if he was due for any procedures  He was due in 2019 for EGD for hx of leon's and gastric polyps  He is due for a colonoscopy in 2/2022  He recently had carotid surgery and his one vocal cord is paralyzed and she is fearful for him to have anything put down his throat  She is going to speak with the surgeon when he sees him and discuss with him

## 2021-07-30 ENCOUNTER — OFFICE VISIT (OUTPATIENT)
Dept: VASCULAR SURGERY | Facility: CLINIC | Age: 66
End: 2021-07-30
Payer: COMMERCIAL

## 2021-07-30 VITALS
SYSTOLIC BLOOD PRESSURE: 118 MMHG | WEIGHT: 160 LBS | TEMPERATURE: 98 F | BODY MASS INDEX: 23.7 KG/M2 | HEART RATE: 86 BPM | HEIGHT: 69 IN | DIASTOLIC BLOOD PRESSURE: 64 MMHG

## 2021-07-30 DIAGNOSIS — I65.22 SYMPTOMATIC STENOSIS OF LEFT CAROTID ARTERY: ICD-10-CM

## 2021-07-30 DIAGNOSIS — J38.01 PARALYSIS OF LEFT VOCAL FOLD: Primary | ICD-10-CM

## 2021-07-30 DIAGNOSIS — Z98.890 HISTORY OF CEA (CAROTID ENDARTERECTOMY): ICD-10-CM

## 2021-07-30 DIAGNOSIS — D68.59 HYPERCOAGULABLE STATE (HCC): ICD-10-CM

## 2021-07-30 DIAGNOSIS — E78.5 HYPERLIPIDEMIA, UNSPECIFIED HYPERLIPIDEMIA TYPE: ICD-10-CM

## 2021-07-30 PROCEDURE — 99214 OFFICE O/P EST MOD 30 MIN: CPT | Performed by: PHYSICIAN ASSISTANT

## 2021-07-30 NOTE — PATIENT INSTRUCTIONS
S/P LEFT CEA  Vagal nerve neuraproxia  -Difficulty with speaking; hoarseness  -otherwise well  -17# wt loss      Plan:  -continue with aspirin and statin therapy  -good heart healthy, diabetic diet  -carotid du in 6 months followed by office visit    -failed Botox on  7/8; follow up ENT 8/13/21 Yohan Caruso, [facility])

## 2021-07-30 NOTE — PROGRESS NOTES
Assessment/Plan:    Symptomatic L carotid artery stenosis  S/P LEFT CEA ([place] 3/22/21)  Vagal nerve neurapraxia; R vocal cord paralyisis    -c/o difficulty with speaking and hoarseness after surgery  -no problems eating, drinking, swallowing or breathing  -failed Botox on 7/8; follow up ENT 8/13/21 (Mary Miller)  -17# wt loss; ? Thinks due to eating slower after vagal nerve injury  -otherwise well; back to activities    -speaking softly in full sentences    -CV du 6/24/21:  R < 50% 109/33; L widely patent 43/12/ 0 51    Plan:     Patient still with nerve injury and seeing ENT at [facility]  He has no new complaints  We reviewed his CV duplex which shows patent L internal carotid endarterectomy site  We should continue with best medical therapy, healthy lifestyle changes and routine duplex surveillance  -continue with aspirin and statin therapy  -good heart healthy, diabetic diet  -patient education regarding carotid artery disease, stroke/tia  -follow up with ENT as scheduled  -carotid du in 6 months followed by office visit, or sooner if needed      Subjective:      Patient ID: Heidi Jacob is a 72 y o  male  Patient is here to rev CV done on 6/24/21  Patient is s/p L CEA done on 3/22/21  Patient c/o hoarseness and numbness since procedure  Patient denies TIA or stroke like symptoms  Patient is taking ASA 81mg and atorvastatin  Patient is a former smoker  HPI    Heidi Jacob is a 72 y o  male DM, Htn, HLD, CAD s/p cor stent, ? Factor V, Hx TIA (R hand numbness 3/12/21) related to LEFT carotid artery stenosis for which he underwent left carotid endarterectomy  After the surgery he had shortness of breath and "difficulty" speaking with voice hoarseness  He was found to have a vocal cord paralysis  He continues to speak softly and c/o hoarseness  He has no problems eating or swallowing but the jaw gets tired with chewing  No pain  He still has some numbness over the L jaw since surgery      He has been seen and evaluated by 2 ENT's  He had a Botox at [facility] on 7/8 which helped for one day and then developed hoarseness again  He will follow up in 2 weeks at [facility]  He will also separately need EGD evaluation  Mr Meghann Andrea is back to work and activities  He completed 3 months of Plavix  He has no new complaints  No cp, SOB  No fevers  Botox 7 8 21  : Microdirect laryngoscopy, left vocal CaHA injection, left-sided parotid chemodenervation of salivary glands  ([place])        A1c 6 7  Total cholesterol 104 HDL 36 LDL 48        VAS CV du 6/24/21  FINDINGS:     Right        Impression  PSV  EDV (cm/s)  Direction of Flow  Ratio    Dist  ICA                 87          28                      0 85    Mid  ICA                 117          39                      1 16    Prox  ICA    1 - 49%     109          33                      1 07    Dist CCA                  94                                          Mid CCA                  102                                  1 30    Prox CCA                  78                                          Ext Carotid              181                                  1 78    Prox Vert                 74           0  Antegrade                   Subclavian               150           0                                 Left         Impression     PSV  EDV (cm/s)  Direction of Flow  Ratio    Dist  ICA                    68          22                      1 55    Mid  ICA                     21           8                      1 49    Prox   ICA    Widely Patent   43          12                      0 51    Dist CCA                     53                                          Mid CCA                      84                                  1 12    Prox CCA                     75                                          Ext Carotid                 213          68                      2 54    Prox Vert                   105          24  Antegrade                   Subclavian 185           0                                       CONCLUSION:  Impression  RIGHT:  There is <50% stenosis noted in the internal carotid artery  Plaque is  heterogenous and smooth  Vertebral artery flow is antegrade  There is no significant subclavian artery  disease  LEFT:  Widely patent internal carotid artery and endarterectomy site  Vertebral artery flow is antegrade  There is no significant subclavian artery  disease  Compared to previous study on  3/19/2021 , there is  Recommend repeat testing in 1 year as per protocol unless otherwise indicated  The following portions of the patient's history were reviewed and updated as appropriate: allergies, current medications, past family history, past medical history, past social history, past surgical history and problem list     Review of Systems   Constitutional: Negative  HENT: Positive for voice change  Eyes: Negative  Respiratory: Negative  Cardiovascular: Negative  Gastrointestinal: Negative  Endocrine: Negative  Genitourinary: Negative  Musculoskeletal: Negative  Skin: Negative  Allergic/Immunologic: Negative  Neurological: Positive for numbness  Hematological: Negative  Psychiatric/Behavioral: Negative  Objective:      /64 (BP Location: Right arm, Patient Position: Sitting, Cuff Size: Standard)   Pulse 86   Temp 98 °F (36 7 °C) (Tympanic)   Ht 5' 9" (1 753 m)   Wt 72 6 kg (160 lb)   BMI 23 63 kg/m²     hoarseness   - speaks well and in full sentences  Well-healed  L neck incision incision     Physical Exam  Vitals and nursing note reviewed  Constitutional:       Appearance: He is well-developed  HENT:      Head: Normocephalic and atraumatic  Eyes:      Pupils: Pupils are equal, round, and reactive to light  Neck:      Thyroid: No thyromegaly  Vascular: No JVD  Trachea: Trachea normal    Cardiovascular:      Rate and Rhythm: Normal rate and regular rhythm  Pulses:           Carotid pulses are 2+ on the right side and 2+ on the left side  Radial pulses are 2+ on the right side and 2+ on the left side  Posterior tibial pulses are 2+ on the right side and 2+ on the left side  Heart sounds: Normal heart sounds, S1 normal and S2 normal  No murmur heard  No friction rub  No gallop  Pulmonary:      Effort: Pulmonary effort is normal  No accessory muscle usage or respiratory distress  Breath sounds: Normal breath sounds  No wheezing or rales  Abdominal:      General: Bowel sounds are normal  There is no distension  Palpations: Abdomen is soft  Tenderness: There is no abdominal tenderness  Musculoskeletal:         General: No deformity  Normal range of motion  Cervical back: Neck supple  Skin:     General: Skin is warm and dry  Findings: No lesion or rash  Nails: There is no clubbing  Neurological:      Mental Status: He is alert and oriented to person, place, and time  Comments: Grossly normal    Psychiatric:         Behavior: Behavior is cooperative  I have reviewed and made appropriate changes to the review of systems input by the medical assistant      Vitals:    07/30/21 1509   BP: 118/64   BP Location: Right arm   Patient Position: Sitting   Cuff Size: Standard   Pulse: 86   Temp: 98 °F (36 7 °C)   TempSrc: Tympanic   Weight: 72 6 kg (160 lb)   Height: 5' 9" (1 753 m)       Patient Active Problem List   Diagnosis    Complete tear of right rotator cuff    [facility]    Carpal tunnel syndrome, bilateral    Diabetes mellitus, type 2 (HCC)    Trigger finger, right index finger    TIA (transient ischemic attack)    Coronary artery disease    Hyperlipidemia    Emanuel's esophagus    MANDI (obstructive sleep apnea)    CPAP (continuous positive airway pressure) dependence    H/O blood clots    Fatty liver    GERD (gastroesophageal reflux disease)    Hypercoagulable state ([facility] )    Symptomatic stenosis of left carotid artery    Paralysis of left vocal fold    Dysphonia    Pharyngoesophageal dysphagia    Muscle tension dysphonia    Glottic insufficiency    History of CEA (carotid endarterectomy)    Jaw pain- first bite syndrome left side       Past Surgical History:   Procedure Laterality Date    ANGIOPLASTY  07/06/2017    one stent    BACK SURGERY  2012    discectomy    CARDIAC CATHETERIZATION  07/06/2017    angioplasty-one stent    CAROTID ENDARTARECTOMY Left 3/22/2021    Procedure: ENDARTERECTOMY ARTERY CAROTID;  Surgeon: Yolanda Pretty MD;  Location:  MAIN OR;  Service: Vascular    CARPAL TUNNEL RELEASE Bilateral     COLONOSCOPY      COLONOSCOPY N/A 2/16/2017    Procedure: COLONOSCOPY;  Surgeon: Kadie Herrera MD;  Location: [facility]; Service:     CORONARY ANGIOPLASTY WITH STENT PLACEMENT  07/2017    ESOPHAGOGASTRODUODENOSCOPY N/A 2/16/2017    Procedure: ESOPHAGOGASTRODUODENOSCOPY (EGD); Surgeon: Kadie Herrera MD;  Location: [facility];   Service:     FRACTURE SURGERY  2005    sternum    MT INCISE FINGER TENDON SHEATH Right 10/10/2019    Procedure: RELEASE TRIGGER FINGER;  Surgeon: Diana Ambriz MD;  Location: [address] [place] [address];  Service: Orthopedics    MT REVISE MEDIAN N/CARPAL TUNNEL SURG Left 5/13/2019    Procedure: RELEASE CARPAL TUNNEL;  Surgeon: Diana Ambriz MD;  Location: [address] [place] [address];  Service: Orthopedics    MT REVISE MEDIAN N/CARPAL TUNNEL SURG Right 5/30/2019    Procedure: RELEASE CARPAL TUNNEL;  Surgeon: Diana Ambriz MD;  Location: [address] [place] [address];  Service: Orthopedics    MT SHLDR ARTHROSCOP,SURG,W/ROTAT CUFF REPR Right 8/23/2018    Procedure: RIGHT SHOULDER REPAIR ROTATOR CUFF  ARTHROSCOPIC, SUACROMIAL DECOMPRESION, REMOVAL LOOSE BODY;  Surgeon: Diana Ambriz MD;  Location: [facility];  Service: Orthopedics    ROTATOR CUFF REPAIR         Family History   Problem Relation Age of Onset    Hyperlipidemia Mother     Cancer Mother         breast    Hypertension Mother     Arthritis Mother     Hyperlipidemia Father     Hypertension Father     Cancer Sister         blood disorder    No Known Problems Brother     No Known Problems Maternal Aunt     No Known Problems Maternal Uncle     No Known Problems Paternal Aunt     No Known Problems Paternal Uncle     Cancer Sister         breast    No Known Problems Sister        Social History     Socioeconomic History    Marital status: /Civil Union     Spouse name: Not on file    Number of children: Not on file    Years of education: Not on file    Highest education level: Not on file   Occupational History    Not on file   Tobacco Use    Smoking status: Former Smoker     Packs/day: 2 00     Years: 20 00     Pack years: 40 00     Types: Cigarettes     Quit date:      Years since quittin 5    Smokeless tobacco: Never Used   Vaping Use    Vaping Use: Never used   Substance and Sexual Activity    Alcohol use: Yes     Alcohol/week: 1 0 standard drinks     Types: 1 Cans of beer per week     Comment: socially    Drug use: Never    Sexual activity: Not Currently   Other Topics Concern    Not on file   Social History Narrative    Not on file     Social Determinants of Health     Financial Resource Strain:     Difficulty of Paying Living Expenses:    Food Insecurity:     Worried About Running Out of Food in the Last Year:     [address] in the Last Year:    Transportation Needs:     Lack of Transportation (Medical):      Lack of Transportation (Non-Medical):    Physical Activity:     Days of Exercise per Week:     Minutes of Exercise per Session:    Stress:     Feeling of Stress :    Social Connections:     Frequency of Communication with Friends and Family:     Frequency of Social Gatherings with Friends and Family:     Attends [employer] Services:     Active Member of Clubs or Organizations:     Attends Club or Organization Meetings:     Marital Status:    Intimate Partner Violence:     Fear of Current or Ex-Partner:     Emotionally Abused:     Physically Abused:     Sexually Abused:         Allergies   Allergen Reactions    Lisinopril Cough         Current Outpatient Medications:     ASPIRIN 81 PO, Take 81 mg by mouth every morning , Disp: , Rfl:     atorvastatin (LIPITOR) 80 mg tablet, Take 1 tablet by mouth in the evening, Disp: 90 tablet, Rfl: 0    Cholecalciferol (VITAMIN D) 2000 UNITS CAPS, Take 2,000 Units by mouth daily at bedtime , Disp: , Rfl:     GLUCOSAMINE CHONDROITIN COMPLX PO, Take by mouth as needed None in 6 months, Disp: , Rfl:     Jardiance 10 MG TABS, Take 10 mg by mouth daily, Disp: , Rfl:     lansoprazole (PREVACID) 30 mg capsule, Take 30 mg by mouth every morning, Disp: , Rfl:     losartan (COZAAR) 50 mg tablet, Take 50 mg by mouth 2 (two) times a day , Disp: , Rfl:     LUMIGAN 0 01 % ophthalmic drops, Administer 1 drop to both eyes daily at bedtime , Disp: , Rfl:     metoprolol succinate (TOPROL-XL) 25 mg 24 hr tablet, 25 mg every morning , Disp: , Rfl:     amLODIPine (NORVASC) 10 mg tablet, Take 1 tablet (10 mg total) by mouth daily (Patient not taking: Reported on 7/30/2021), Disp: 30 tablet, Rfl: 3    Azelastine HCl 0 15 % SOLN, , Disp: , Rfl:     chlorthalidone 25 mg tablet, Take 0 5 tablets (12 5 mg total) by mouth daily, Disp: 30 tablet, Rfl: 3    clopidogrel (PLAVIX) 75 mg tablet, Take 1 tablet (75 mg total) by mouth daily, Disp: 30 tablet, Rfl: 3    fluticasone (FLONASE) 50 mcg/act nasal spray, 2 sprays into each nostril as needed  (Patient not taking: Reported on 7/30/2021), Disp: , Rfl:     metFORMIN (GLUCOPHAGE-XR) 750 mg 24 hr tablet, TAKE AS DIRECTED 3 TABLET DAILY, Disp: , Rfl: 3

## 2021-08-19 ENCOUNTER — HOSPITAL ENCOUNTER (OUTPATIENT)
Dept: RADIOLOGY | Facility: HOSPITAL | Age: 66
Discharge: HOME | End: 2021-08-19
Payer: COMMERCIAL

## 2021-08-19 DIAGNOSIS — D18.03 HEMANGIOMA OF INTRA-ABDOMINAL STRUCTURES: ICD-10-CM

## 2021-08-19 PROCEDURE — 74183 MRI ABD W/O CNTR FLWD CNTR: CPT

## 2021-08-19 PROCEDURE — A9585 GADOBUTROL INJECTION: HCPCS | Performed by: INTERNAL MEDICINE

## 2021-08-19 RX ADMIN — GADOBUTROL 7 ML: 604.72 INJECTION INTRAVENOUS at 09:38

## 2021-09-17 ENCOUNTER — APPOINTMENT (OUTPATIENT)
Dept: LAB | Facility: CLINIC | Age: 66
End: 2021-09-17
Payer: COMMERCIAL

## 2021-09-28 ENCOUNTER — OFFICE VISIT (OUTPATIENT)
Dept: SLEEP CENTER | Facility: CLINIC | Age: 66
End: 2021-09-28
Payer: COMMERCIAL

## 2021-09-28 VITALS
SYSTOLIC BLOOD PRESSURE: 128 MMHG | WEIGHT: 162.8 LBS | BODY MASS INDEX: 24.11 KG/M2 | HEIGHT: 69 IN | HEART RATE: 67 BPM | DIASTOLIC BLOOD PRESSURE: 64 MMHG

## 2021-09-28 DIAGNOSIS — G47.30 SLEEP APNEA, UNSPECIFIED TYPE: ICD-10-CM

## 2021-09-28 DIAGNOSIS — G47.33 OSA (OBSTRUCTIVE SLEEP APNEA): Primary | ICD-10-CM

## 2021-09-28 PROCEDURE — 99214 OFFICE O/P EST MOD 30 MIN: CPT | Performed by: INTERNAL MEDICINE

## 2021-09-28 NOTE — PROGRESS NOTES
Consultation - Siddhartha 1 : 1955  MRN: [id number]      Assessment:  The patient has previously diagnosed severe obstructive sleep apnea, diagnosed approximately 15 years ago  He has been using CPAP of 8 cm ever since  His machine is antiquated and he requires a new one  Medicare regulations may require repeat diagnostic testing  If MANDI is present, I will start the patient on APAP with a lower limit of 8 cm, since he may have a higher pressure requirement since his initial prescription  He does have occasional snoring while wearing CPAP, according to his wife  Plan:  Diagnostic polysomnography    Follow up: After testing    History of Present Illness:   72 y  o male with a previous history of snoring and daytime sleepiness who underwent evaluation in   Testing at that time showed AHI = 31 9 and the patient was titrated to 8 cm water pressure, which he has been using since  He requires a new machine at this point  He lost approximately 30 lb for unknown reason  Workup for cancer was negative  He occasionally has snoring while wearing his CPAP  He has been fully compliant over the years        Review of Systems      Genitourinary none   Cardiology none   Gastrointestinal frequent heartburn/acid reflux   Neurology none   Constitutional weight change   Integumentary none   Psychiatry none   Musculoskeletal joint pain, back pain and leg cramps   Pulmonary none   ENT throat clearing and ringing in ears   Endocrine none   Hematological none         I have reviewed and updated the review of systems as necessary    Historical Information    Past Medical History:  Emanuel's esophagus, GERD, fatty liver, dysphagia, paralyzed vocal cord, diabetes mellitus, TIA, CAD, carotid stenosis    Family History: non-contributory    Social History     Socioeconomic History    Marital status: /Civil Union     Spouse name: None    Number of children: None    Years of education: None  Highest education level: None   Occupational History    None   Tobacco Use    Smoking status: Former Smoker     Packs/day: 2 00     Years: 20 00     Pack years: 40 00     Types: Cigarettes     Quit date:      Years since quittin 7    Smokeless tobacco: Never Used   Vaping Use    Vaping Use: Never used   Substance and Sexual Activity    Alcohol use: Yes     Alcohol/week: 1 0 standard drinks     Types: 1 Cans of beer per week     Comment: socially    Drug use: Never    Sexual activity: Not Currently   Other Topics Concern    None   Social History Narrative    None     Social Determinants of Health     Financial Resource Strain:     Difficulty of Paying Living Expenses:    Food Insecurity:     Worried About Running Out of Food in the Last Year:     Ran Out of Food in the Last Year:    Transportation Needs:     Lack of Transportation (Medical):      Lack of Transportation (Non-Medical):    Physical Activity:     Days of Exercise per Week:     Minutes of Exercise per Session:    Stress:     Feeling of Stress :    Social Connections:     Frequency of Communication with Friends and Family:     Frequency of Social Gatherings with Friends and Family:     Attends Restoration Services:     Active Member of Clubs or Organizations:     Attends Club or Organization Meetings:     Marital Status:    Intimate Partner Violence:     Fear of Current or Ex-Partner:     Emotionally Abused:     Physically Abused:     Sexually Abused:          Sleep Schedule: unremarkable    Snoring:  Rare    Witnessed Apnea:  No    Medications/Allergies:    Current Outpatient Medications:     ASPIRIN 81 PO, Take 81 mg by mouth every morning , Disp: , Rfl:     atorvastatin (LIPITOR) 80 mg tablet, Take 1 tablet by mouth in the evening, Disp: 90 tablet, Rfl: 0    Cholecalciferol (VITAMIN D) 2000 UNITS CAPS, Take 2,000 Units by mouth daily at bedtime , Disp: , Rfl:     Jardiance 10 MG TABS, Take 10 mg by mouth daily, Disp: , Rfl:     lansoprazole (PREVACID) 30 mg capsule, Take 30 mg by mouth every morning, Disp: , Rfl:     losartan (COZAAR) 50 mg tablet, Take 50 mg by mouth 2 (two) times a day , Disp: , Rfl:     LUMIGAN 0 01 % ophthalmic drops, Administer 1 drop to both eyes daily at bedtime , Disp: , Rfl:     metoprolol succinate (TOPROL-XL) 25 mg 24 hr tablet, 25 mg every morning , Disp: , Rfl:     amLODIPine (NORVASC) 10 mg tablet, Take 1 tablet (10 mg total) by mouth daily (Patient not taking: Reported on 7/30/2021), Disp: 30 tablet, Rfl: 3    Azelastine HCl 0 15 % SOLN, , Disp: , Rfl:     chlorthalidone 25 mg tablet, Take 0 5 tablets (12 5 mg total) by mouth daily (Patient not taking: Reported on 9/28/2021), Disp: 30 tablet, Rfl: 3    clopidogrel (PLAVIX) 75 mg tablet, Take 1 tablet (75 mg total) by mouth daily (Patient not taking: Reported on 9/28/2021), Disp: 30 tablet, Rfl: 3    fluticasone (FLONASE) 50 mcg/act nasal spray, 2 sprays into each nostril as needed  (Patient not taking: Reported on 7/30/2021), Disp: , Rfl:     GLUCOSAMINE CHONDROITIN COMPLX PO, Take by mouth as needed None in 6 months (Patient not taking: Reported on 9/28/2021), Disp: , Rfl:     metFORMIN (GLUCOPHAGE-XR) 750 mg 24 hr tablet, TAKE AS DIRECTED 3 TABLET DAILY (Patient not taking: Reported on 9/28/2021), Disp: , Rfl: 3        No notes on file                  Objective:    Vital Signs:   Vitals:    09/28/21 1500   BP: 128/64   Pulse: 67   Weight: 73 8 kg (162 lb 12 8 oz)   Height: 5' 9" (1 753 m)     Neck Circumference: 15 5      [place] Sleepiness Scale:  Total score: 7    Physical Exam:    General: Alert, appropriate, cooperative, thin    Head: NC/AT, moderate retrognathia    Nose: No septal deviation    Throat: Airway diminished, tongue base thickened, no tonsils visualized    Neck: Normal, no thyromegaly or lymphadenopathy, no JVD    Heart: RR, normal S1 and S2, no murmurs    Chest: Clear bilaterally    Extremity: No clubbing, cyanosis, no edema    Skin: Warm, dry    Neuro: No motor abnormalities, cranial nerves appear intact    Sleep Study Results:   AHI = 31 9  PAP Pressure: CPAP: 8 cm  DME Provider: [employer] [facility]    Counseling / Coordination of Care  A description of the counseling / coordination of care: We discussed a plan for re-evaluating for obstructive sleep apnea  Board Certified Sleep Specialist    Portions of the record may have been created with voice recognition software  Occasional wrong word or "sound a like" substitutions may have occurred due to the inherent limitations of voice recognition software  Read the chart carefully and recognize, using context, where substitutions have occurred

## 2021-09-29 ENCOUNTER — TELEPHONE (OUTPATIENT)
Dept: SLEEP CENTER | Facility: CLINIC | Age: 66
End: 2021-09-29

## 2021-10-01 NOTE — TELEPHONE ENCOUNTER
Received fax from Dr Richard Daigle office to schedule patient for EGD and colonoscopy with Dr Antonio Kearney  Pt is overdue for EGD and not due for colon until February 2022  Has hx of polyps and Emanuel's  I tried calling Dr Richard Daigle office due to speaking to wife in July and also pt not being due until 2022 for colon, but his office is closed  They reopen on Monday at 9:30 am  Will call them on Monday

## 2021-10-04 NOTE — TELEPHONE ENCOUNTER
Called Dr Ck Mcgee office and spoke with Sanjay Sahni  She is going to ask Dr Rodney Zhou if just the EGD should be done and hold off with the colon until February or just wait until February to do both  Will f/u in a week if I don't hear back from his office

## 2021-10-11 NOTE — TELEPHONE ENCOUNTER
Called and spoke with Brain Guevara at Dr [facility] Branch office  He is away until tomorrow  She will ask him tomorrow and soon as she has an answer, will call me  Will f/u in 1 week if I don't hear anything  No

## 2021-10-13 NOTE — TELEPHONE ENCOUNTER
Put recall in chart for flip to be done in February of 2022 for hx of leon's and hx of colon polyps

## 2021-10-13 NOTE — TELEPHONE ENCOUNTER
Lena Contreras from Dr Kennedy Orf office called and advised that patient's procedures can be done together in February

## 2021-11-29 NOTE — TELEPHONE ENCOUNTER
Called pt and left a voicemail requesting pt call us back to schedule OV for after CV scheduled for 12/27/21  Gave pt phone number [phone] opt  3 for scheduling

## 2021-12-15 ENCOUNTER — HOSPITAL ENCOUNTER (OUTPATIENT)
Dept: SLEEP CENTER | Facility: CLINIC | Age: 66
Discharge: HOME | End: 2021-12-15
Payer: COMMERCIAL

## 2021-12-15 DIAGNOSIS — G47.33 OSA (OBSTRUCTIVE SLEEP APNEA): ICD-10-CM

## 2021-12-15 PROCEDURE — 95810 POLYSOM 6/> YRS 4/> PARAM: CPT

## 2021-12-17 ENCOUNTER — TELEPHONE (OUTPATIENT)
Dept: SLEEP CENTER | Facility: CLINIC | Age: 66
End: 2021-12-17

## 2021-12-21 NOTE — TELEPHONE ENCOUNTER
Spoke with pt, pt is scheduled for OV on 02/11/22 at the [facility], with [employer]  Pt wants domer only

## 2021-12-27 ENCOUNTER — HOSPITAL ENCOUNTER (OUTPATIENT)
Dept: RADIOLOGY | Facility: HOSPITAL | Age: 66
Discharge: HOME | End: 2021-12-27
Payer: COMMERCIAL

## 2021-12-27 ENCOUNTER — HOSPITAL ENCOUNTER (OUTPATIENT)
Dept: RADIOLOGY | Facility: HOSPITAL | Age: 66
Discharge: HOME | End: 2021-12-27
Attending: SURGERY
Payer: COMMERCIAL

## 2021-12-27 DIAGNOSIS — I65.23 CAROTID STENOSIS, BILATERAL: ICD-10-CM

## 2021-12-27 PROCEDURE — 93880 EXTRACRANIAL BILAT STUDY: CPT

## 2021-12-30 PROCEDURE — 93880 EXTRACRANIAL BILAT STUDY: CPT | Performed by: SURGERY

## 2022-01-07 ENCOUNTER — APPOINTMENT (OUTPATIENT)
Dept: LAB | Facility: CLINIC | Age: 67
End: 2022-01-07
Payer: COMMERCIAL

## 2022-02-11 ENCOUNTER — OFFICE VISIT (OUTPATIENT)
Dept: VASCULAR SURGERY | Facility: CLINIC | Age: 67
End: 2022-02-11
Payer: COMMERCIAL

## 2022-02-11 ENCOUNTER — APPOINTMENT (OUTPATIENT)
Dept: LAB | Facility: CLINIC | Age: 67
End: 2022-02-11
Payer: COMMERCIAL

## 2022-02-11 VITALS
SYSTOLIC BLOOD PRESSURE: 120 MMHG | DIASTOLIC BLOOD PRESSURE: 60 MMHG | HEIGHT: 69 IN | TEMPERATURE: 98 F | BODY MASS INDEX: 24.44 KG/M2 | WEIGHT: 165 LBS | HEART RATE: 64 BPM

## 2022-02-11 DIAGNOSIS — Z98.890 HISTORY OF CEA (CAROTID ENDARTERECTOMY): ICD-10-CM

## 2022-02-11 DIAGNOSIS — E11.8 TYPE 2 DIABETES MELLITUS WITH COMPLICATION (HCC): ICD-10-CM

## 2022-02-11 DIAGNOSIS — I65.23 CAROTID STENOSIS, BILATERAL: Primary | ICD-10-CM

## 2022-02-11 PROCEDURE — 99214 OFFICE O/P EST MOD 30 MIN: CPT | Performed by: SURGERY

## 2022-02-11 NOTE — LETTER
February 11, 2022     Samuel Mg MD  [address] [place] [id number]    Patient: Genia Zepeda   YOB: 1955   Date of Visit: 2/11/2022       Dear Dr Oneil Jeffers: Thank you for referring Debbie Ferguson to me for evaluation  Below are the relevant portions of my assessment and plan of care  Pt is here for six months follow up and to review the study of CV done 12/27/21  The carotid duplex reveals elevated velocity of 274 centimeters/second with an end-diastolic velocity of 64 centimeters/second distal to the endarterectomy site  This may be at the area where the clamp was placed  Patient does have moderate renal insufficiency therefore I am somewhat reluctant to obtain a CTA at this point  Patient remains asymptomatic from a neurologic perspective  Therefore, we will repeat the carotid duplex in 6 months if continued velocity elevations are noted will once again discuss performance of CTA  Pt denies stroke/TIA symptoms  He is a former smoker and is taking aspirin and atorvastatin  He underwent a Microdirect laryngoscopy, left vocal CaHA injection, left-sided parotid chemodenervation of salivary glands on 7/8/2021  Voice is improved unable to raise his voice  He can manage to talk but cannot project  Denies aspiration at this time  First bite has almost completely resolved  Patient is s/p L CEA done on 3/22/21  Patient c/o hoarseness and numbness since procedure  Patient denies TIA or stroke like symptoms  Patient is taking ASA 81mg and atorvastatin  Patient is a former smoker  Assessment/Plan:    History of CEA (carotid endarterectomy)  History of carotid endarterectomy 03/22/2021  Patient sustained injury to the left vocal cord  Patient has subsequently underwent microdirect laryngoscopy, left vocal CaHA injection, left-sided parotid chemodenervation of salivary glands on 7/8/2021  There has been significant improvement in his voice and is now able to converse comfortably  He continues to follow with ENT at [facility]  Carotid duplex shows elevated velocity of 274 centimeters/second with an end-diastolic velocity of 64 centimeters/second at an area distal to the endarterectomized site  This is likely the site of the clamp  However there is questionable plaque present  We discussed obtaining a CTA however he has moderate renal insufficiency therefore we will hold off for now  He will be evaluated again with carotid duplex in 6 months  If worsening velocities are noted we will again have a discussion regarding CTA  Patient will continue on aspirin and statin  If you have questions, please do not hesitate to call me  I look forward to following Emiliano Graves along with you           Sincerely,        Yumiko Aguirre MD        CC: No Recipients

## 2022-02-11 NOTE — ASSESSMENT & PLAN NOTE
History of carotid endarterectomy 03/22/2021  Patient sustained injury to the left vocal cord  Patient has subsequently underwent microdirect laryngoscopy, left vocal CaHA injection, left-sided parotid chemodenervation of salivary glands on 7/8/2021  There has been significant improvement in his voice and is now able to converse comfortably  He continues to follow with ENT at [facility]  Carotid duplex shows elevated velocity of 274 centimeters/second with an end-diastolic velocity of 64 centimeters/second at an area distal to the endarterectomized site  This is likely the site of the clamp  However there is questionable plaque present  We discussed obtaining a CTA however he has moderate renal insufficiency therefore we will hold off for now  He will be evaluated again with carotid duplex in 6 months  If worsening velocities are noted we will again have a discussion regarding CTA  Patient will continue on aspirin and statin

## 2022-02-11 NOTE — PROGRESS NOTES
Assessment/Plan:    History of CEA (carotid endarterectomy)  History of carotid endarterectomy 03/22/2021  Patient sustained injury to the left vocal cord  Patient has subsequently underwent microdirect laryngoscopy, left vocal CaHA injection, left-sided parotid chemodenervation of salivary glands on 7/8/2021  There has been significant improvement in his voice and is now able to converse comfortably  He continues to follow with ENT at [facility]  Carotid duplex shows elevated velocity of 274 centimeters/second with an end-diastolic velocity of 64 centimeters/second at an area distal to the endarterectomized site  This is likely the site of the clamp  However there is questionable plaque present  We discussed obtaining a CTA however he has moderate renal insufficiency therefore we will hold off for now  He will be evaluated again with carotid duplex in 6 months  If worsening velocities are noted we will again have a discussion regarding CTA  Patient will continue on aspirin and statin  Diagnoses and all orders for this visit:    Carotid stenosis, bilateral  -     VAS carotid complete study; Future    Type 2 diabetes mellitus with complication (HCC)    History of CEA (carotid endarterectomy)          Subjective:      Patient ID: Les Marshall is a 77 y o  male  Pt is here for six months follow up and to review the study of CV done 12/27/21  The carotid duplex reveals elevated velocity of 274 centimeters/second with an end-diastolic velocity of 64 centimeters/second distal to the endarterectomy site  This may be at the area where the clamp was placed  Patient does have moderate renal insufficiency therefore I am somewhat reluctant to obtain a CTA at this point  Patient remains asymptomatic from a neurologic perspective  Therefore, we will repeat the carotid duplex in 6 months if continued velocity elevations are noted will once again discuss performance of CTA  Pt denies stroke/TIA symptoms  He is a former smoker and is taking aspirin and atorvastatin  He underwent a Microdirect laryngoscopy, left vocal CaHA injection, left-sided parotid chemodenervation of salivary glands on 7/8/2021  Voice is improved unable to raise his voice  He can manage to talk but cannot project  Denies aspiration at this time  First bite has almost completely resolved  Patient is s/p L CEA done on 3/22/21  Patient c/o hoarseness and numbness since procedure  Patient denies TIA or stroke like symptoms  Patient is taking ASA 81mg and atorvastatin  Patient is a former smoker  Oma Young is a 72 y o  male DM, Htn, HLD, CAD s/p cor stent, ? Factor V, Hx TIA (R hand numbness 3/12/21) related to LEFT carotid artery stenosis for which he underwent left carotid endarterectomy  After the surgery he had shortness of breath and "difficulty" speaking with voice hoarseness  He was found to have a vocal cord paralysis  He continues to speak softly and c/o hoarseness  He has no problems eating or swallowing but the jaw gets tired with chewing  No pain  He still has some numbness over the L jaw since surgery      He follows with ENT at [facility]  The following portions of the patient's history were reviewed and updated as appropriate: allergies, current medications, past family history, past medical history, past social history, past surgical history and problem list     Review of Systems   Constitutional: Negative  HENT: Negative  Eyes: Negative  Respiratory: Negative  Cardiovascular: Negative  Gastrointestinal: Negative  Endocrine: Negative  Genitourinary: Negative  Musculoskeletal: Negative  Skin: Negative  Allergic/Immunologic: Negative  Neurological: Negative  Hematological: Negative  Psychiatric/Behavioral: Negative            Objective:      /60 (BP Location: Left arm, Patient Position: Sitting, Cuff Size: Standard)   Pulse 64   Temp 98 °F (36 7 °C) (Tympanic)   Ht 5' 9" (1 753 m)   Wt 74 8 kg (165 lb)   BMI 24 37 kg/m²          Physical Exam  Vitals and nursing note reviewed  Constitutional:       Appearance: He is well-developed  HENT:      Head: Normocephalic and atraumatic  Eyes:      Conjunctiva/sclera: Conjunctivae normal       Pupils: Pupils are equal, round, and reactive to light  Neck:      Vascular: No JVD  Comments: Well-healed left cervical incision  Cardiovascular:      Rate and Rhythm: Normal rate and regular rhythm  Heart sounds: Normal heart sounds  Pulmonary:      Effort: Pulmonary effort is normal  No respiratory distress  Breath sounds: Normal breath sounds  No stridor  No wheezing or rales  Chest:      Chest wall: No tenderness  Abdominal:      General: There is no distension  Palpations: Abdomen is soft  There is no mass  Tenderness: There is no abdominal tenderness  There is no guarding or rebound  Musculoskeletal:         General: No tenderness or deformity  Normal range of motion  Cervical back: Normal range of motion and neck supple  Skin:     General: Skin is warm and dry  Findings: No erythema or rash  Neurological:      Mental Status: He is alert and oriented to person, place, and time  Psychiatric:         Behavior: Behavior normal          Thought Content:  Thought content normal

## 2022-02-16 ENCOUNTER — TELEPHONE (OUTPATIENT)
Dept: GASTROENTEROLOGY | Facility: CLINIC | Age: 67
End: 2022-02-16

## 2022-02-16 NOTE — TELEPHONE ENCOUNTER
Returned pts call, he was calling to schedule FLIP with Dr Ivan Valenzuela  Hx of leon's and colon polyps  I called him back and lmom for him to call us back  When he calls back please get him scheduled at the hospital  He has a hx of throat problems and needs to be done @ hospital  He was asking for a Monday in April  Dr Ivan Valenzuela is not at the hospital on [address] [facility] [address] in April  Thank you

## 2022-02-17 ENCOUNTER — PREP FOR PROCEDURE (OUTPATIENT)
Dept: GASTROENTEROLOGY | Facility: CLINIC | Age: 67
End: 2022-02-17

## 2022-02-17 ENCOUNTER — TELEPHONE (OUTPATIENT)
Dept: GASTROENTEROLOGY | Facility: CLINIC | Age: 67
End: 2022-02-17

## 2022-02-17 DIAGNOSIS — Z86.010 HISTORY OF COLON POLYPS: Primary | ICD-10-CM

## 2022-02-17 DIAGNOSIS — Z87.19 HISTORY OF BARRETT'S ESOPHAGUS: ICD-10-CM

## 2022-02-17 NOTE — TELEPHONE ENCOUNTER
Scheduled date of colonoscopy (as of today):04/06/22  Physician performing colonoscopy:Dr Clark  Location of colonoscopy:[facility]  Bowel prep reviewed with patient:Miralax, dulcolax  Instructions reviewed with patient by:mary kay  Clearances: n/a

## 2022-04-01 ENCOUNTER — TELEPHONE (OUTPATIENT)
Dept: GASTROENTEROLOGY | Facility: CLINIC | Age: 67
End: 2022-04-01

## 2022-04-06 ENCOUNTER — ANESTHESIA EVENT (OUTPATIENT)
Dept: GASTROENTEROLOGY | Facility: AMBULARY SURGERY CENTER | Age: 67
End: 2022-04-06

## 2022-04-06 ENCOUNTER — ANESTHESIA (OUTPATIENT)
Dept: GASTROENTEROLOGY | Facility: AMBULARY SURGERY CENTER | Age: 67
End: 2022-04-06

## 2022-04-06 ENCOUNTER — HOSPITAL ENCOUNTER (OUTPATIENT)
Dept: GASTROENTEROLOGY | Facility: AMBULARY SURGERY CENTER | Age: 67
Setting detail: OUTPATIENT SURGERY
Discharge: HOME | End: 2022-04-06
Attending: INTERNAL MEDICINE | Admitting: INTERNAL MEDICINE
Payer: COMMERCIAL

## 2022-04-06 VITALS
SYSTOLIC BLOOD PRESSURE: 136 MMHG | HEIGHT: 69 IN | DIASTOLIC BLOOD PRESSURE: 70 MMHG | TEMPERATURE: 96.2 F | OXYGEN SATURATION: 99 % | HEART RATE: 88 BPM | RESPIRATION RATE: 16 BRPM | BODY MASS INDEX: 23.7 KG/M2 | WEIGHT: 160 LBS

## 2022-04-06 DIAGNOSIS — Z87.19 HISTORY OF BARRETT'S ESOPHAGUS: ICD-10-CM

## 2022-04-06 DIAGNOSIS — Z86.010 HISTORY OF COLON POLYPS: ICD-10-CM

## 2022-04-06 PROBLEM — Z98.61 HISTORY OF PTCA: Status: ACTIVE | Noted: 2022-04-06

## 2022-04-06 PROBLEM — Z98.890 PONV (POSTOPERATIVE NAUSEA AND VOMITING): Status: ACTIVE | Noted: 2022-04-06

## 2022-04-06 PROBLEM — I82.409 DVT (DEEP VENOUS THROMBOSIS) (HCC): Status: ACTIVE | Noted: 2022-04-06

## 2022-04-06 PROBLEM — I10 HTN (HYPERTENSION): Status: ACTIVE | Noted: 2022-04-06

## 2022-04-06 PROBLEM — R11.2 PONV (POSTOPERATIVE NAUSEA AND VOMITING): Status: ACTIVE | Noted: 2022-04-06

## 2022-04-06 LAB — GLUCOSE SERPL-MCNC: 122 MG/DL (ref 65–140)

## 2022-04-06 PROCEDURE — 45380 COLONOSCOPY AND BIOPSY: CPT | Performed by: INTERNAL MEDICINE

## 2022-04-06 PROCEDURE — 45385 COLONOSCOPY W/LESION REMOVAL: CPT | Performed by: INTERNAL MEDICINE

## 2022-04-06 PROCEDURE — 43239 EGD BIOPSY SINGLE/MULTIPLE: CPT | Performed by: INTERNAL MEDICINE

## 2022-04-06 PROCEDURE — 82948 REAGENT STRIP/BLOOD GLUCOSE: CPT

## 2022-04-06 PROCEDURE — 88305 TISSUE EXAM BY PATHOLOGIST: CPT | Performed by: PATHOLOGY

## 2022-04-06 RX ORDER — PROPOFOL 10 MG/ML
INJECTION, EMULSION INTRAVENOUS AS NEEDED
Status: DISCONTINUED | OUTPATIENT
Start: 2022-04-06 | End: 2022-04-06

## 2022-04-06 RX ORDER — SODIUM CHLORIDE, SODIUM LACTATE, POTASSIUM CHLORIDE, CALCIUM CHLORIDE 600; 310; 30; 20 MG/100ML; MG/100ML; MG/100ML; MG/100ML
75 INJECTION, SOLUTION INTRAVENOUS CONTINUOUS
Status: DISCONTINUED | OUTPATIENT
Start: 2022-04-06 | End: 2022-04-10 | Stop reason: HOSPADM

## 2022-04-06 RX ORDER — LIDOCAINE HYDROCHLORIDE 10 MG/ML
INJECTION, SOLUTION EPIDURAL; INFILTRATION; INTRACAUDAL; PERINEURAL AS NEEDED
Status: DISCONTINUED | OUTPATIENT
Start: 2022-04-06 | End: 2022-04-06

## 2022-04-06 RX ORDER — SODIUM CHLORIDE, SODIUM LACTATE, POTASSIUM CHLORIDE, CALCIUM CHLORIDE 600; 310; 30; 20 MG/100ML; MG/100ML; MG/100ML; MG/100ML
INJECTION, SOLUTION INTRAVENOUS CONTINUOUS PRN
Status: DISCONTINUED | OUTPATIENT
Start: 2022-04-06 | End: 2022-04-06

## 2022-04-06 RX ORDER — PROPOFOL 10 MG/ML
INJECTION, EMULSION INTRAVENOUS CONTINUOUS PRN
Status: DISCONTINUED | OUTPATIENT
Start: 2022-04-06 | End: 2022-04-06

## 2022-04-06 RX ADMIN — SODIUM CHLORIDE, SODIUM LACTATE, POTASSIUM CHLORIDE, AND CALCIUM CHLORIDE 75 ML/HR: .6; .31; .03; .02 INJECTION, SOLUTION INTRAVENOUS at 08:24

## 2022-04-06 RX ADMIN — SODIUM CHLORIDE, SODIUM LACTATE, POTASSIUM CHLORIDE, AND CALCIUM CHLORIDE: .6; .31; .03; .02 INJECTION, SOLUTION INTRAVENOUS at 08:28

## 2022-04-06 RX ADMIN — PROPOFOL 100 MG: 10 INJECTION, EMULSION INTRAVENOUS at 08:31

## 2022-04-06 RX ADMIN — PROPOFOL 50 MG: 10 INJECTION, EMULSION INTRAVENOUS at 08:32

## 2022-04-06 RX ADMIN — PROPOFOL 50 MG: 10 INJECTION, EMULSION INTRAVENOUS at 08:33

## 2022-04-06 RX ADMIN — LIDOCAINE HYDROCHLORIDE 50 MG: 10 INJECTION, SOLUTION EPIDURAL; INFILTRATION; INTRACAUDAL; PERINEURAL at 08:31

## 2022-04-06 RX ADMIN — PROPOFOL 150 MCG/KG/MIN: 10 INJECTION, EMULSION INTRAVENOUS at 08:33

## 2022-04-06 NOTE — ANESTHESIA POSTPROCEDURE EVALUATION
Post-Op Assessment Note    CV Status:  Stable  Pain Score: 0    Pain management: adequate     Mental Status:  Sleepy   Hydration Status:  Stable   PONV Controlled:  None   Airway Patency:  Patent   Two or more mitigation strategies used for obstructive sleep apnea   Post Op Vitals Reviewed: Yes      Staff: CRNA         No complications documented      BP   116/55   Temp 97   Pulse 85   Resp 16   SpO2 99

## 2022-04-06 NOTE — ANESTHESIA PREPROCEDURE EVALUATION
Procedure:  COLONOSCOPY  EGD    Relevant Problems   ANESTHESIA   (+) PONV (postoperative nausea and vomiting)      CARDIO   (+) Coronary artery disease   (+) DVT (deep venous thrombosis) (HCC)   (+) HTN (hypertension)   (+) History of PTCA with stent   (+) Hyperlipidemia      ENDO   (+) Diabetes mellitus, type 2 (HCC)   (+) Type 2 diabetes mellitus with complication (HCC)      GI/HEPATIC   (+) Fatty liver   (+) GERD (gastroesophageal reflux disease)   (+) Pharyngoesophageal dysphagia      HEMATOLOGY   (+) Hypercoagulable state (HCC)      NEURO/PSYCH   (+) H/O blood clots   (+) TIA (transient ischemic attack)      PULMONARY   (+) MANDI (obstructive sleep apnea)      Other   (+) Emanuel's esophagus   (+) CPAP (continuous positive airway pressure) dependence   (+) Factor 5 Leiden mutation, heterozygous ([address] )   (+) History of CEA (carotid endarterectomy)   (+) Paralysis of left vocal fold        Physical Exam    Airway    Mallampati score: II  TM Distance: >3 FB  Neck ROM: full     Dental       Cardiovascular  Rhythm: regular, Rate: normal,     Pulmonary  Breath sounds clear to auscultation,     Other Findings        Anesthesia Plan  ASA Score- 3     Anesthesia Type- IV sedation with anesthesia with ASA Monitors  Additional Monitors:   Airway Plan:           Plan Factors-    Chart reviewed  Patient is not a current smoker  Induction- intravenous  Postoperative Plan-     Informed Consent- Anesthetic plan and risks discussed with patient  I personally reviewed this patient with the CRNA  Discussed and agreed on the Anesthesia Plan with the CRNA  Venessa Clark
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
## 2022-06-24 ENCOUNTER — APPOINTMENT (OUTPATIENT)
Dept: LAB | Facility: CLINIC | Age: 67
End: 2022-06-24
Payer: COMMERCIAL

## 2022-06-29 ENCOUNTER — TELEPHONE (OUTPATIENT)
Dept: SLEEP CENTER | Facility: CLINIC | Age: 67
End: 2022-06-29

## 2022-06-29 NOTE — TELEPHONE ENCOUNTER
Returned a call from the patient's wife  She is asking about a replacement for her husbands recalled Mora machine   Left call back message advised she call Mora for up date

## 2022-07-01 ENCOUNTER — HOSPITAL ENCOUNTER (OUTPATIENT)
Dept: RADIOLOGY | Facility: HOSPITAL | Age: 67
Discharge: HOME | End: 2022-07-01
Payer: COMMERCIAL

## 2022-07-01 ENCOUNTER — APPOINTMENT (OUTPATIENT)
Dept: LAB | Facility: CLINIC | Age: 67
End: 2022-07-01
Payer: COMMERCIAL

## 2022-07-01 DIAGNOSIS — I25.10 CORONARY ARTERY DISEASE WITHOUT ANGINA PECTORIS, UNSPECIFIED VESSEL OR LESION TYPE, UNSPECIFIED WHETHER NATIVE OR TRANSPLANTED HEART: ICD-10-CM

## 2022-07-01 DIAGNOSIS — I25.10 ATHEROSCLEROSIS OF NATIVE CORONARY ARTERY OF NATIVE HEART WITHOUT ANGINA PECTORIS: ICD-10-CM

## 2022-07-01 DIAGNOSIS — R06.00 DYSPNEA, UNSPECIFIED TYPE: ICD-10-CM

## 2022-07-01 DIAGNOSIS — I25.10 CVD (CARDIOVASCULAR DISEASE): ICD-10-CM

## 2022-07-01 LAB — NT-PROBNP SERPL-MCNC: 68 PG/ML

## 2022-07-01 PROCEDURE — 83880 ASSAY OF NATRIURETIC PEPTIDE: CPT

## 2022-07-01 PROCEDURE — 71046 X-RAY EXAM CHEST 2 VIEWS: CPT

## 2022-07-01 PROCEDURE — 36415 COLL VENOUS BLD VENIPUNCTURE: CPT

## 2022-07-20 ENCOUNTER — OFFICE VISIT (OUTPATIENT)
Dept: OBGYN CLINIC | Facility: CLINIC | Age: 67
End: 2022-07-20
Payer: COMMERCIAL

## 2022-07-20 ENCOUNTER — APPOINTMENT (OUTPATIENT)
Dept: RADIOLOGY | Facility: CLINIC | Age: 67
End: 2022-07-20
Payer: COMMERCIAL

## 2022-07-20 VITALS
DIASTOLIC BLOOD PRESSURE: 70 MMHG | SYSTOLIC BLOOD PRESSURE: 136 MMHG | HEART RATE: 80 BPM | HEIGHT: 69 IN | BODY MASS INDEX: 23.7 KG/M2 | WEIGHT: 160 LBS

## 2022-07-20 DIAGNOSIS — M47.816 LOCALIZED OSTEOARTHRITIS OF LUMBAR SPINE: ICD-10-CM

## 2022-07-20 DIAGNOSIS — M46.1 SACROILIITIS (HCC): ICD-10-CM

## 2022-07-20 DIAGNOSIS — M16.0 PRIMARY OSTEOARTHRITIS OF BOTH HIPS: Primary | ICD-10-CM

## 2022-07-20 DIAGNOSIS — M54.50 LOW BACK PAIN, UNSPECIFIED BACK PAIN LATERALITY, UNSPECIFIED CHRONICITY, UNSPECIFIED WHETHER SCIATICA PRESENT: ICD-10-CM

## 2022-07-20 DIAGNOSIS — M25.551 PAIN IN RIGHT HIP: ICD-10-CM

## 2022-07-20 PROCEDURE — 73502 X-RAY EXAM HIP UNI 2-3 VIEWS: CPT

## 2022-07-20 PROCEDURE — 72100 X-RAY EXAM L-S SPINE 2/3 VWS: CPT

## 2022-07-20 PROCEDURE — 99214 OFFICE O/P EST MOD 30 MIN: CPT | Performed by: ORTHOPAEDIC SURGERY
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
## 2022-08-01 ENCOUNTER — EVALUATION (OUTPATIENT)
Dept: PHYSICAL THERAPY | Facility: CLINIC | Age: 67
End: 2022-08-01
Payer: COMMERCIAL

## 2022-08-01 DIAGNOSIS — M16.0 PRIMARY OSTEOARTHRITIS OF BOTH HIPS: ICD-10-CM

## 2022-08-01 DIAGNOSIS — M46.1 SACROILIITIS (HCC): ICD-10-CM

## 2022-08-01 DIAGNOSIS — M47.816 LOCALIZED OSTEOARTHRITIS OF LUMBAR SPINE: ICD-10-CM

## 2022-08-01 PROCEDURE — 97162 PT EVAL MOD COMPLEX 30 MIN: CPT | Performed by: PHYSICAL THERAPIST

## 2022-08-01 PROCEDURE — 97110 THERAPEUTIC EXERCISES: CPT | Performed by: PHYSICAL THERAPIST
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
## 2022-08-03 ENCOUNTER — OFFICE VISIT (OUTPATIENT)
Dept: PHYSICAL THERAPY | Facility: CLINIC | Age: 67
End: 2022-08-03
Payer: COMMERCIAL

## 2022-08-03 DIAGNOSIS — M47.816 LOCALIZED OSTEOARTHRITIS OF LUMBAR SPINE: ICD-10-CM

## 2022-08-03 DIAGNOSIS — M46.1 SACROILIITIS (HCC): ICD-10-CM

## 2022-08-03 DIAGNOSIS — M16.0 PRIMARY OSTEOARTHRITIS OF BOTH HIPS: Primary | ICD-10-CM

## 2022-08-03 PROCEDURE — 97140 MANUAL THERAPY 1/> REGIONS: CPT | Performed by: PHYSICAL THERAPIST

## 2022-08-03 PROCEDURE — 97112 NEUROMUSCULAR REEDUCATION: CPT | Performed by: PHYSICAL THERAPIST

## 2022-08-03 PROCEDURE — 97110 THERAPEUTIC EXERCISES: CPT | Performed by: PHYSICAL THERAPIST
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
## 2022-08-05 ENCOUNTER — HOSPITAL ENCOUNTER (OUTPATIENT)
Dept: NON INVASIVE DIAGNOSTICS | Facility: HOSPITAL | Age: 67
Discharge: HOME | End: 2022-08-05
Payer: COMMERCIAL

## 2022-08-05 ENCOUNTER — HOSPITAL ENCOUNTER (OUTPATIENT)
Dept: RADIOLOGY | Facility: HOSPITAL | Age: 67
Discharge: HOME | End: 2022-08-05
Payer: COMMERCIAL

## 2022-08-05 VITALS
HEART RATE: 67 BPM | SYSTOLIC BLOOD PRESSURE: 113 MMHG | DIASTOLIC BLOOD PRESSURE: 72 MMHG | BODY MASS INDEX: 23.7 KG/M2 | WEIGHT: 160 LBS | HEIGHT: 69 IN

## 2022-08-05 DIAGNOSIS — R06.02 SHORTNESS OF BREATH: ICD-10-CM

## 2022-08-05 DIAGNOSIS — I25.10 CORONARY ARTERY DISEASE, UNSPECIFIED VESSEL OR LESION TYPE, UNSPECIFIED WHETHER ANGINA PRESENT, UNSPECIFIED WHETHER NATIVE OR TRANSPLANTED HEART: ICD-10-CM

## 2022-08-05 DIAGNOSIS — R07.89 ATYPICAL CHEST PAIN: ICD-10-CM

## 2022-08-05 LAB
AORTIC ROOT: 3 CM
APICAL FOUR CHAMBER EJECTION FRACTION: 38 %
ARRHY DURING EX: NORMAL
AV LVOT PEAK GRADIENT: 3 MMHG
AV PEAK GRADIENT: 5 MMHG
CHEST PAIN STATEMENT: NORMAL
DOP CALC LVOT AREA: 3.14 CM2
DOP CALC LVOT DIAMETER: 2 CM
E WAVE DECELERATION TIME: 211 MS
FRACTIONAL SHORTENING: 27 % (ref 28–44)
INTERVENTRICULAR SEPTUM IN DIASTOLE (PARASTERNAL SHORT AXIS VIEW): 1.1 CM
INTERVENTRICULAR SEPTUM: 1.1 CM (ref 0.6–1.1)
LAAS-AP2: 14.8 CM2
LAAS-AP4: 16.1 CM2
LEFT ATRIUM AREA SYSTOLE SINGLE PLANE A4C: 15.7 CM2
LEFT ATRIUM SIZE: 3.3 CM
LEFT INTERNAL DIMENSION IN SYSTOLE: 2.7 CM (ref 2.1–4)
LEFT VENTRICLE DIASTOLIC VOLUME (MOD BIPLANE): 107 ML
LEFT VENTRICLE SYSTOLIC VOLUME (MOD BIPLANE): 68 ML
LEFT VENTRICULAR INTERNAL DIMENSION IN DIASTOLE: 3.7 CM (ref 3.5–6)
LEFT VENTRICULAR POSTERIOR WALL IN END DIASTOLE: 1.1 CM
LEFT VENTRICULAR STROKE VOLUME: 32 ML
LV EF: 36 %
LVSV (TEICH): 32 ML
MAX DIASTOLIC BP: 70 MMHG
MAX HEART RATE: 155 BPM
MAX PREDICTED HEART RATE: 154 BPM
MAX. SYSTOLIC BP: 222 MMHG
MV E'TISSUE VEL-LAT: 10 CM/S
MV E'TISSUE VEL-SEP: 10 CM/S
MV PEAK A VEL: 1.13 M/S
MV PEAK E VEL: 90 CM/S
MV STENOSIS PRESSURE HALF TIME: 61 MS
MV VALVE AREA P 1/2 METHOD: 3.61 CM2
PROTOCOL NAME: NORMAL
PV PEAK GRADIENT: 3 MMHG
REASON FOR TERMINATION: NORMAL
RIGHT ATRIUM AREA SYSTOLE A4C: 13.6 CM2
RIGHT VENTRICLE ID DIMENSION: 3.3 CM
SL CV LEFT ATRIUM LENGTH A2C: 4.1 CM
SL CV LV EF: 50
SL CV PED ECHO LEFT VENTRICLE DIASTOLIC VOLUME (MOD BIPLANE) 2D: 59 ML
SL CV PED ECHO LEFT VENTRICLE SYSTOLIC VOLUME (MOD BIPLANE) 2D: 27 ML
TARGET HR FORMULA: NORMAL
TEST INDICATION: NORMAL
TIME IN EXERCISE PHASE: NORMAL

## 2022-08-05 PROCEDURE — 78452 HT MUSCLE IMAGE SPECT MULT: CPT

## 2022-08-05 PROCEDURE — 93306 TTE W/DOPPLER COMPLETE: CPT | Performed by: INTERNAL MEDICINE

## 2022-08-05 PROCEDURE — G1004 CDSM NDSC: HCPCS

## 2022-08-05 PROCEDURE — 93017 CV STRESS TEST TRACING ONLY: CPT

## 2022-08-05 PROCEDURE — 93306 TTE W/DOPPLER COMPLETE: CPT

## 2022-08-05 PROCEDURE — A9502 TC99M TETROFOSMIN: HCPCS

## 2022-08-05 PROCEDURE — 93016 CV STRESS TEST SUPVJ ONLY: CPT | Performed by: INTERNAL MEDICINE

## 2022-08-05 PROCEDURE — 93018 CV STRESS TEST I&R ONLY: CPT | Performed by: INTERNAL MEDICINE

## 2022-08-08 ENCOUNTER — OFFICE VISIT (OUTPATIENT)
Dept: PHYSICAL THERAPY | Facility: CLINIC | Age: 67
End: 2022-08-08
Payer: COMMERCIAL

## 2022-08-08 DIAGNOSIS — M16.0 PRIMARY OSTEOARTHRITIS OF BOTH HIPS: Primary | ICD-10-CM

## 2022-08-08 DIAGNOSIS — M46.1 SACROILIITIS (HCC): ICD-10-CM

## 2022-08-08 DIAGNOSIS — M47.816 LOCALIZED OSTEOARTHRITIS OF LUMBAR SPINE: ICD-10-CM

## 2022-08-08 PROCEDURE — 97112 NEUROMUSCULAR REEDUCATION: CPT | Performed by: PHYSICAL THERAPIST

## 2022-08-08 PROCEDURE — 97110 THERAPEUTIC EXERCISES: CPT | Performed by: PHYSICAL THERAPIST
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
## 2022-08-10 ENCOUNTER — OFFICE VISIT (OUTPATIENT)
Dept: PHYSICAL THERAPY | Facility: CLINIC | Age: 67
End: 2022-08-10
Payer: COMMERCIAL

## 2022-08-10 DIAGNOSIS — M47.816 LOCALIZED OSTEOARTHRITIS OF LUMBAR SPINE: ICD-10-CM

## 2022-08-10 DIAGNOSIS — M16.0 PRIMARY OSTEOARTHRITIS OF BOTH HIPS: Primary | ICD-10-CM

## 2022-08-10 DIAGNOSIS — M46.1 SACROILIITIS (HCC): ICD-10-CM

## 2022-08-10 LAB
MAX HR PERCENT: 100 %
MAX HR: 155 BPM
NUC STRESS EJECTION FRACTION: 63 %
RATE PRESSURE PRODUCT: NORMAL
SL CV REST NUCLEAR ISOTOPE DOSE: 11 MCI
SL CV STRESS NUCLEAR ISOTOPE DOSE: 32.8 MCI
SL CV STRESS RECOVERY BP: NORMAL MMHG
SL CV STRESS RECOVERY HR: 76 BPM
SL CV STRESS RECOVERY O2 SAT: 99 %
SL CV STRESS STAGE REACHED: 3
STRESS ANGINA INDEX: 1
STRESS BASELINE BP: NORMAL MMHG
STRESS BASELINE HR: 57 BPM
STRESS O2 SAT REST: 100 %
STRESS PEAK HR: 155 BPM
STRESS POST ESTIMATED WORKLOAD: 10.1 METS
STRESS POST EXERCISE DUR MIN: 7 MIN
STRESS POST EXERCISE DUR SEC: 30 SEC
STRESS POST O2 SAT PEAK: 100 %
STRESS POST PEAK BP: 222 MMHG
STRESS/REST PERFUSION RATIO: 1.13

## 2022-08-10 PROCEDURE — 97110 THERAPEUTIC EXERCISES: CPT

## 2022-08-10 PROCEDURE — 97112 NEUROMUSCULAR REEDUCATION: CPT
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
## 2022-08-12 ENCOUNTER — HOSPITAL ENCOUNTER (OUTPATIENT)
Dept: RADIOLOGY | Facility: HOSPITAL | Age: 67
Discharge: HOME | End: 2022-08-12
Attending: SURGERY
Payer: COMMERCIAL

## 2022-08-12 DIAGNOSIS — I65.23 CAROTID STENOSIS, BILATERAL: ICD-10-CM

## 2022-08-12 PROCEDURE — 93880 EXTRACRANIAL BILAT STUDY: CPT

## 2022-08-15 ENCOUNTER — APPOINTMENT (OUTPATIENT)
Dept: PHYSICAL THERAPY | Facility: CLINIC | Age: 67
End: 2022-08-15
Payer: COMMERCIAL

## 2022-08-15 PROCEDURE — 93880 EXTRACRANIAL BILAT STUDY: CPT | Performed by: SURGERY

## 2022-08-16 DIAGNOSIS — I65.29 ASYMPTOMATIC CAROTID ARTERY STENOSIS, UNSPECIFIED LATERALITY: Primary | ICD-10-CM

## 2022-08-17 ENCOUNTER — OFFICE VISIT (OUTPATIENT)
Dept: PHYSICAL THERAPY | Facility: CLINIC | Age: 67
End: 2022-08-17
Payer: COMMERCIAL

## 2022-08-17 DIAGNOSIS — M16.0 PRIMARY OSTEOARTHRITIS OF BOTH HIPS: Primary | ICD-10-CM

## 2022-08-17 PROCEDURE — 97110 THERAPEUTIC EXERCISES: CPT

## 2022-08-17 PROCEDURE — 97112 NEUROMUSCULAR REEDUCATION: CPT
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
## 2022-08-24 ENCOUNTER — OFFICE VISIT (OUTPATIENT)
Dept: PHYSICAL THERAPY | Facility: CLINIC | Age: 67
End: 2022-08-24
Payer: COMMERCIAL

## 2022-08-24 DIAGNOSIS — M47.816 LOCALIZED OSTEOARTHRITIS OF LUMBAR SPINE: ICD-10-CM

## 2022-08-24 DIAGNOSIS — M46.1 SACROILIITIS (HCC): ICD-10-CM

## 2022-08-24 DIAGNOSIS — M16.0 PRIMARY OSTEOARTHRITIS OF BOTH HIPS: Primary | ICD-10-CM

## 2022-08-24 PROCEDURE — 97110 THERAPEUTIC EXERCISES: CPT

## 2022-08-24 PROCEDURE — 97112 NEUROMUSCULAR REEDUCATION: CPT
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
## 2022-08-29 ENCOUNTER — EVALUATION (OUTPATIENT)
Dept: PHYSICAL THERAPY | Facility: CLINIC | Age: 67
End: 2022-08-29
Payer: COMMERCIAL

## 2022-08-29 DIAGNOSIS — M47.816 LOCALIZED OSTEOARTHRITIS OF LUMBAR SPINE: ICD-10-CM

## 2022-08-29 DIAGNOSIS — M46.1 SACROILIITIS (HCC): ICD-10-CM

## 2022-08-29 DIAGNOSIS — M16.0 PRIMARY OSTEOARTHRITIS OF BOTH HIPS: Primary | ICD-10-CM

## 2022-08-29 PROCEDURE — 97110 THERAPEUTIC EXERCISES: CPT | Performed by: PHYSICAL THERAPIST

## 2022-08-29 PROCEDURE — 97112 NEUROMUSCULAR REEDUCATION: CPT | Performed by: PHYSICAL THERAPIST
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
## 2022-08-31 ENCOUNTER — OFFICE VISIT (OUTPATIENT)
Dept: PHYSICAL THERAPY | Facility: CLINIC | Age: 67
End: 2022-08-31
Payer: COMMERCIAL

## 2022-08-31 DIAGNOSIS — M16.0 PRIMARY OSTEOARTHRITIS OF BOTH HIPS: Primary | ICD-10-CM

## 2022-08-31 DIAGNOSIS — M47.816 LOCALIZED OSTEOARTHRITIS OF LUMBAR SPINE: ICD-10-CM

## 2022-08-31 DIAGNOSIS — M46.1 SACROILIITIS (HCC): ICD-10-CM

## 2022-08-31 PROCEDURE — 97110 THERAPEUTIC EXERCISES: CPT

## 2022-08-31 PROCEDURE — 97112 NEUROMUSCULAR REEDUCATION: CPT
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
## 2022-09-06 ENCOUNTER — OFFICE VISIT (OUTPATIENT)
Dept: PHYSICAL THERAPY | Facility: CLINIC | Age: 67
End: 2022-09-06
Payer: COMMERCIAL

## 2022-09-06 DIAGNOSIS — M47.816 LOCALIZED OSTEOARTHRITIS OF LUMBAR SPINE: ICD-10-CM

## 2022-09-06 DIAGNOSIS — M46.1 SACROILIITIS (HCC): ICD-10-CM

## 2022-09-06 DIAGNOSIS — M16.0 PRIMARY OSTEOARTHRITIS OF BOTH HIPS: Primary | ICD-10-CM

## 2022-09-06 PROCEDURE — 97110 THERAPEUTIC EXERCISES: CPT | Performed by: PHYSICAL THERAPIST

## 2022-09-06 PROCEDURE — 97112 NEUROMUSCULAR REEDUCATION: CPT | Performed by: PHYSICAL THERAPIST
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
## 2022-09-08 ENCOUNTER — OFFICE VISIT (OUTPATIENT)
Dept: PHYSICAL THERAPY | Facility: CLINIC | Age: 67
End: 2022-09-08
Payer: COMMERCIAL

## 2022-09-08 DIAGNOSIS — M47.816 LOCALIZED OSTEOARTHRITIS OF LUMBAR SPINE: ICD-10-CM

## 2022-09-08 DIAGNOSIS — M16.0 PRIMARY OSTEOARTHRITIS OF BOTH HIPS: Primary | ICD-10-CM

## 2022-09-08 DIAGNOSIS — M46.1 SACROILIITIS (HCC): ICD-10-CM

## 2022-09-08 PROCEDURE — 97110 THERAPEUTIC EXERCISES: CPT | Performed by: PHYSICAL THERAPIST

## 2022-09-08 PROCEDURE — 97112 NEUROMUSCULAR REEDUCATION: CPT | Performed by: PHYSICAL THERAPIST
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
## 2022-09-12 ENCOUNTER — OFFICE VISIT (OUTPATIENT)
Dept: PHYSICAL THERAPY | Facility: CLINIC | Age: 67
End: 2022-09-12
Payer: COMMERCIAL

## 2022-09-12 DIAGNOSIS — M16.0 PRIMARY OSTEOARTHRITIS OF BOTH HIPS: Primary | ICD-10-CM

## 2022-09-12 DIAGNOSIS — M46.1 SACROILIITIS (HCC): ICD-10-CM

## 2022-09-12 DIAGNOSIS — M47.816 LOCALIZED OSTEOARTHRITIS OF LUMBAR SPINE: ICD-10-CM

## 2022-09-12 PROCEDURE — 97110 THERAPEUTIC EXERCISES: CPT

## 2022-09-12 PROCEDURE — 97112 NEUROMUSCULAR REEDUCATION: CPT
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
## 2022-09-14 ENCOUNTER — APPOINTMENT (OUTPATIENT)
Dept: PHYSICAL THERAPY | Facility: CLINIC | Age: 67
End: 2022-09-14
Payer: COMMERCIAL

## 2022-09-19 ENCOUNTER — OFFICE VISIT (OUTPATIENT)
Dept: PHYSICAL THERAPY | Facility: CLINIC | Age: 67
End: 2022-09-19
Payer: COMMERCIAL

## 2022-09-19 DIAGNOSIS — M46.1 SACROILIITIS (HCC): ICD-10-CM

## 2022-09-19 DIAGNOSIS — M47.816 LOCALIZED OSTEOARTHRITIS OF LUMBAR SPINE: ICD-10-CM

## 2022-09-19 DIAGNOSIS — M16.0 PRIMARY OSTEOARTHRITIS OF BOTH HIPS: Primary | ICD-10-CM

## 2022-09-19 PROCEDURE — 97110 THERAPEUTIC EXERCISES: CPT | Performed by: PHYSICAL THERAPIST

## 2022-09-19 PROCEDURE — 97112 NEUROMUSCULAR REEDUCATION: CPT | Performed by: PHYSICAL THERAPIST
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
## 2022-09-20 ENCOUNTER — TELEPHONE (OUTPATIENT)
Dept: VASCULAR SURGERY | Facility: CLINIC | Age: 67
End: 2022-09-20

## 2022-09-20 NOTE — TELEPHONE ENCOUNTER
Attempted to contact patient to schedule appointment(s) listed below  Requested patient call [phone] option 3 to schedule appointment(s)  Patient's appointment(s) are past due, expected ASAP  6 mo f/u due to review CV done 08/12/22    Dopplers  [] Abdominal Aorta Iliac (AOIL)  [] Carotid (CV)   [] Celiac and/or Mesenteric  [] Endovascular Aortic Repair (EVAR)   [] Hemodialysis Access (HD)   [] Lower Limb Arterial (JESSI)  [] Lower Limb Venous Duplex with Reflux (LEVDR)  [] Renal Artery  [] Upper Limb Arterial (UEA)    [] Upper Limb Venous (UEV)              [] YASEMIN and Waveform analysis     Advanced Imaging   [] CTA head/neck    [] CTA abdomen    [] CTA abdomen & pelvis    [] CT abdomen with/ without contrast  [] CT abdomen with contrast  [] CT abdomen without contrast    [] CT abdomen & pelvis with/ without contrast  [] CT abdomen & pelvis with contrast  [] CT abdomen & pelvis without contrast    Office Visit   [] New patient, patient last seen over 3 years ago  [] Risk factor modification (RFM)   [x] Follow up  6 mo f/u due to review CV done 08/12/22  [] Lost to follow up (LTFU)

## 2022-09-21 ENCOUNTER — APPOINTMENT (OUTPATIENT)
Dept: PHYSICAL THERAPY | Facility: CLINIC | Age: 67
End: 2022-09-21
Payer: COMMERCIAL

## 2022-10-07 ENCOUNTER — TELEPHONE (OUTPATIENT)
Dept: SLEEP CENTER | Facility: CLINIC | Age: 67
End: 2022-10-07

## 2022-11-11 ENCOUNTER — TELEMEDICINE (OUTPATIENT)
Dept: VASCULAR SURGERY | Facility: CLINIC | Age: 67
End: 2022-11-11

## 2022-11-11 VITALS — WEIGHT: 160 LBS | BODY MASS INDEX: 23.7 KG/M2 | HEIGHT: 69 IN

## 2022-11-11 DIAGNOSIS — E11.8 TYPE 2 DIABETES MELLITUS WITH COMPLICATION (HCC): ICD-10-CM

## 2022-11-11 DIAGNOSIS — I10 PRIMARY HYPERTENSION: ICD-10-CM

## 2022-11-11 DIAGNOSIS — Z98.890 HISTORY OF CEA (CAROTID ENDARTERECTOMY): ICD-10-CM

## 2022-11-11 DIAGNOSIS — I65.22 SYMPTOMATIC STENOSIS OF LEFT CAROTID ARTERY: Primary | ICD-10-CM

## 2022-11-11 DIAGNOSIS — E78.5 HYPERLIPIDEMIA, UNSPECIFIED HYPERLIPIDEMIA TYPE: ICD-10-CM

## 2022-11-11 DIAGNOSIS — D68.51 FACTOR 5 LEIDEN MUTATION, HETEROZYGOUS (HCC): ICD-10-CM
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
## 2022-11-11 NOTE — PATIENT INSTRUCTIONS
Carotid artery stenosis   S/P LEFT CEA for symptomatic lesion ([place] 3/22/21)    -seeing ENT at [facility]    -R carotid artery with moderate narrowing and L carotid endart site remains open  -continue with good blood pressure, cholesterol and glucose control  -Healthy lifestyle changes  -patient education regarding stroke and carotid artery disease  -continue with asa/atorvastatin 80 therapy  -check CV duplex in 6 months (Due Feb '23) with office visit with vascular surgeon          Symptoms of stroke:  - Unable to speak or understand speech  - Unable to move one side of the body (arm or leg)  - Visual changes  - Call 911 for any symptoms of stroke

## 2023-01-13 ENCOUNTER — OFFICE VISIT (OUTPATIENT)
Dept: OBGYN CLINIC | Facility: CLINIC | Age: 68
End: 2023-01-13

## 2023-01-13 ENCOUNTER — APPOINTMENT (OUTPATIENT)
Dept: RADIOLOGY | Facility: CLINIC | Age: 68
End: 2023-01-13

## 2023-01-13 VITALS
TEMPERATURE: 98.4 F | HEART RATE: 72 BPM | BODY MASS INDEX: 25.1 KG/M2 | SYSTOLIC BLOOD PRESSURE: 166 MMHG | WEIGHT: 170 LBS | DIASTOLIC BLOOD PRESSURE: 71 MMHG

## 2023-01-13 DIAGNOSIS — M25.512 LEFT SHOULDER PAIN, UNSPECIFIED CHRONICITY: ICD-10-CM

## 2023-01-13 DIAGNOSIS — M65.4 DE QUERVAIN'S TENOSYNOVITIS, RIGHT: ICD-10-CM

## 2023-01-13 DIAGNOSIS — M75.102 NONTRAUMATIC TEAR OF LEFT ROTATOR CUFF, UNSPECIFIED TEAR EXTENT: Primary | ICD-10-CM

## 2023-01-13 RX ORDER — LIDOCAINE HYDROCHLORIDE 20 MG/ML
1 INJECTION, SOLUTION INFILTRATION; PERINEURAL
Status: COMPLETED | OUTPATIENT
Start: 2023-01-13 | End: 2023-01-13

## 2023-01-13 RX ORDER — DEXAMETHASONE SODIUM PHOSPHATE 10 MG/ML
40 INJECTION, SOLUTION INTRAMUSCULAR; INTRAVENOUS
Status: COMPLETED | OUTPATIENT
Start: 2023-01-13 | End: 2023-01-13

## 2023-01-13 RX ADMIN — DEXAMETHASONE SODIUM PHOSPHATE 40 MG: 10 INJECTION, SOLUTION INTRAMUSCULAR; INTRAVENOUS at 18:03

## 2023-01-13 RX ADMIN — LIDOCAINE HYDROCHLORIDE 1 ML: 20 INJECTION, SOLUTION INFILTRATION; PERINEURAL at 18:03
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
## 2023-02-08 ENCOUNTER — EVALUATION (OUTPATIENT)
Dept: PHYSICAL THERAPY | Facility: CLINIC | Age: 68
End: 2023-02-08

## 2023-02-08 DIAGNOSIS — M75.102 NONTRAUMATIC TEAR OF LEFT ROTATOR CUFF, UNSPECIFIED TEAR EXTENT: ICD-10-CM
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
## 2023-02-10 ENCOUNTER — APPOINTMENT (OUTPATIENT)
Dept: LAB | Facility: CLINIC | Age: 68
End: 2023-02-10

## 2023-02-13 ENCOUNTER — HOSPITAL ENCOUNTER (OUTPATIENT)
Dept: RADIOLOGY | Facility: HOSPITAL | Age: 68
Discharge: HOME | End: 2023-02-13

## 2023-02-13 ENCOUNTER — APPOINTMENT (OUTPATIENT)
Dept: PHYSICAL THERAPY | Facility: CLINIC | Age: 68
End: 2023-02-13

## 2023-02-13 DIAGNOSIS — I65.22 SYMPTOMATIC STENOSIS OF LEFT CAROTID ARTERY: ICD-10-CM

## 2023-02-14 ENCOUNTER — TRANSCRIBE ORDERS (OUTPATIENT)
Dept: VASCULAR SURGERY | Facility: CLINIC | Age: 68
End: 2023-02-14

## 2023-02-14 DIAGNOSIS — I65.22 SYMPTOMATIC STENOSIS OF LEFT CAROTID ARTERY: Primary | ICD-10-CM

## 2023-02-15 ENCOUNTER — OFFICE VISIT (OUTPATIENT)
Dept: PHYSICAL THERAPY | Facility: CLINIC | Age: 68
End: 2023-02-15

## 2023-02-15 DIAGNOSIS — M75.102 NONTRAUMATIC TEAR OF LEFT ROTATOR CUFF, UNSPECIFIED TEAR EXTENT: Primary | ICD-10-CM
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
## 2023-02-20 ENCOUNTER — APPOINTMENT (OUTPATIENT)
Dept: PHYSICAL THERAPY | Facility: CLINIC | Age: 68
End: 2023-02-20

## 2023-02-22 ENCOUNTER — OFFICE VISIT (OUTPATIENT)
Dept: PHYSICAL THERAPY | Facility: CLINIC | Age: 68
End: 2023-02-22

## 2023-02-22 DIAGNOSIS — M75.102 NONTRAUMATIC TEAR OF LEFT ROTATOR CUFF, UNSPECIFIED TEAR EXTENT: Primary | ICD-10-CM
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
## 2023-02-24 ENCOUNTER — OFFICE VISIT (OUTPATIENT)
Dept: OBGYN CLINIC | Facility: CLINIC | Age: 68
End: 2023-02-24

## 2023-02-24 VITALS
WEIGHT: 170 LBS | HEIGHT: 69 IN | DIASTOLIC BLOOD PRESSURE: 82 MMHG | TEMPERATURE: 98.2 F | BODY MASS INDEX: 25.18 KG/M2 | SYSTOLIC BLOOD PRESSURE: 145 MMHG | HEART RATE: 67 BPM

## 2023-02-24 DIAGNOSIS — M75.102 NONTRAUMATIC TEAR OF LEFT ROTATOR CUFF, UNSPECIFIED TEAR EXTENT: ICD-10-CM

## 2023-02-24 DIAGNOSIS — M65.4 DE QUERVAIN'S TENOSYNOVITIS, RIGHT: Primary | ICD-10-CM
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
## 2023-02-27 ENCOUNTER — APPOINTMENT (OUTPATIENT)
Dept: PHYSICAL THERAPY | Facility: CLINIC | Age: 68
End: 2023-02-27

## 2023-02-27 ENCOUNTER — TELEPHONE (OUTPATIENT)
Dept: PHYSICAL THERAPY | Facility: CLINIC | Age: 68
End: 2023-02-27

## 2023-02-27 NOTE — TELEPHONE ENCOUNTER
Patient left message on Sunday 2/26/23 canceling today due to illness, confirmed wed 3/1/23 appt       [place] Raven PT, DPT   License #  [id number]

## 2023-03-01 ENCOUNTER — OFFICE VISIT (OUTPATIENT)
Dept: PHYSICAL THERAPY | Facility: CLINIC | Age: 68
End: 2023-03-01

## 2023-03-01 DIAGNOSIS — M75.102 NONTRAUMATIC TEAR OF LEFT ROTATOR CUFF, UNSPECIFIED TEAR EXTENT: Primary | ICD-10-CM
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
## 2023-03-08 ENCOUNTER — APPOINTMENT (OUTPATIENT)
Dept: PHYSICAL THERAPY | Facility: CLINIC | Age: 68
End: 2023-03-08

## 2023-03-15 ENCOUNTER — OFFICE VISIT (OUTPATIENT)
Dept: PHYSICAL THERAPY | Facility: CLINIC | Age: 68
End: 2023-03-15

## 2023-03-15 DIAGNOSIS — M75.102 NONTRAUMATIC TEAR OF LEFT ROTATOR CUFF, UNSPECIFIED TEAR EXTENT: Primary | ICD-10-CM
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
## 2023-03-20 ENCOUNTER — OFFICE VISIT (OUTPATIENT)
Dept: OBGYN CLINIC | Facility: CLINIC | Age: 68
End: 2023-03-20

## 2023-03-20 VITALS
DIASTOLIC BLOOD PRESSURE: 72 MMHG | WEIGHT: 171.2 LBS | SYSTOLIC BLOOD PRESSURE: 152 MMHG | TEMPERATURE: 98.1 F | BODY MASS INDEX: 25.36 KG/M2 | HEIGHT: 69 IN | HEART RATE: 62 BPM

## 2023-03-20 DIAGNOSIS — M65.4 DE QUERVAIN'S TENOSYNOVITIS, RIGHT: Primary | ICD-10-CM
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
## 2023-03-22 ENCOUNTER — APPOINTMENT (OUTPATIENT)
Dept: PHYSICAL THERAPY | Facility: CLINIC | Age: 68
End: 2023-03-22

## 2023-03-22 ENCOUNTER — OFFICE VISIT (OUTPATIENT)
Dept: PHYSICAL THERAPY | Facility: CLINIC | Age: 68
End: 2023-03-22

## 2023-03-22 DIAGNOSIS — M75.102 NONTRAUMATIC TEAR OF LEFT ROTATOR CUFF, UNSPECIFIED TEAR EXTENT: Primary | ICD-10-CM
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
## 2023-03-29 ENCOUNTER — EVALUATION (OUTPATIENT)
Dept: PHYSICAL THERAPY | Facility: CLINIC | Age: 68
End: 2023-03-29

## 2023-03-29 DIAGNOSIS — M75.102 NONTRAUMATIC TEAR OF LEFT ROTATOR CUFF, UNSPECIFIED TEAR EXTENT: Primary | ICD-10-CM
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
## 2023-05-01 ENCOUNTER — HOSPITAL ENCOUNTER (OUTPATIENT)
Dept: RADIOLOGY | Facility: HOSPITAL | Age: 68
Discharge: HOME | End: 2023-05-01

## 2023-05-01 DIAGNOSIS — R51.9 HEADACHE, UNSPECIFIED: ICD-10-CM

## 2023-05-01 DIAGNOSIS — J38.00 PARALYSIS OF VOCAL CORDS AND LARYNX, UNSPECIFIED: ICD-10-CM

## 2023-05-01 DIAGNOSIS — R13.10 DYSPHAGIA, UNSPECIFIED: ICD-10-CM

## 2023-05-01 DIAGNOSIS — J01.90 ACUTE SINUSITIS, UNSPECIFIED: ICD-10-CM
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
## 2023-07-14 ENCOUNTER — TRANSCRIBE ORDERS (OUTPATIENT)
Dept: LAB | Facility: CLINIC | Age: 68
End: 2023-07-14

## 2023-07-14 ENCOUNTER — APPOINTMENT (OUTPATIENT)
Dept: LAB | Facility: CLINIC | Age: 68
End: 2023-07-14
Payer: COMMERCIAL

## 2023-07-14 DIAGNOSIS — I10 ESSENTIAL HYPERTENSION, MALIGNANT: ICD-10-CM

## 2023-07-14 DIAGNOSIS — I25.119 ATHEROSCLEROSIS OF NATIVE CORONARY ARTERY WITH ANGINA PECTORIS, UNSPECIFIED WHETHER NATIVE OR TRANSPLANTED HEART (HCC): ICD-10-CM

## 2023-07-14 DIAGNOSIS — E78.49 FAMILIAL COMBINED HYPERLIPIDEMIA: ICD-10-CM

## 2023-07-14 DIAGNOSIS — E11.69 DIABETES MELLITUS ASSOCIATED WITH HORMONAL ETIOLOGY (HCC): ICD-10-CM

## 2023-07-14 DIAGNOSIS — I63.032 CEREBRAL INFARCTION DUE TO THROMBOSIS OF LEFT CAROTID ARTERY (HCC): Primary | ICD-10-CM

## 2023-07-14 PROCEDURE — 36415 COLL VENOUS BLD VENIPUNCTURE: CPT

## 2023-07-14 PROCEDURE — 80053 COMPREHEN METABOLIC PANEL: CPT

## 2023-07-14 PROCEDURE — 85025 COMPLETE CBC W/AUTO DIFF WBC: CPT

## 2023-07-14 PROCEDURE — 83036 HEMOGLOBIN GLYCOSYLATED A1C: CPT

## 2023-07-14 PROCEDURE — 80061 LIPID PANEL: CPT

## 2023-07-15 LAB
ALBUMIN SERPL BCP-MCNC: 3.9 G/DL (ref 3.5–5)
ALP SERPL-CCNC: 62 U/L (ref 46–116)
ALT SERPL W P-5'-P-CCNC: 34 U/L (ref 12–78)
ANION GAP SERPL CALCULATED.3IONS-SCNC: 4 MMOL/L
AST SERPL W P-5'-P-CCNC: 25 U/L (ref 5–45)
BASOPHILS # BLD AUTO: 0.05 THOUSANDS/ÂΜL (ref 0–0.1)
BASOPHILS NFR BLD AUTO: 1 % (ref 0–1)
BILIRUB SERPL-MCNC: 0.7 MG/DL (ref 0.2–1)
BUN SERPL-MCNC: 20 MG/DL (ref 5–25)
CALCIUM SERPL-MCNC: 9.8 MG/DL (ref 8.3–10.1)
CHLORIDE SERPL-SCNC: 110 MMOL/L (ref 96–108)
CHOLEST SERPL-MCNC: 96 MG/DL
CO2 SERPL-SCNC: 27 MMOL/L (ref 21–32)
CREAT SERPL-MCNC: 1.51 MG/DL (ref 0.6–1.3)
EOSINOPHIL # BLD AUTO: 0.15 THOUSAND/ÂΜL (ref 0–0.61)
EOSINOPHIL NFR BLD AUTO: 2 % (ref 0–6)
ERYTHROCYTE [DISTWIDTH] IN BLOOD BY AUTOMATED COUNT: 14.6 % (ref 11.6–15.1)
EST. AVERAGE GLUCOSE BLD GHB EST-MCNC: 146 MG/DL
GFR SERPL CREATININE-BSD FRML MDRD: 47 ML/MIN/1.73SQ M
GLUCOSE P FAST SERPL-MCNC: 136 MG/DL (ref 65–99)
HBA1C MFR BLD: 6.7 %
HCT VFR BLD AUTO: 44.8 % (ref 36.5–49.3)
HDLC SERPL-MCNC: 40 MG/DL
HGB BLD-MCNC: 13.5 G/DL (ref 12–17)
IMM GRANULOCYTES # BLD AUTO: 0.03 THOUSAND/UL (ref 0–0.2)
IMM GRANULOCYTES NFR BLD AUTO: 1 % (ref 0–2)
LDLC SERPL CALC-MCNC: 45 MG/DL (ref 0–100)
LYMPHOCYTES # BLD AUTO: 2.22 THOUSANDS/ÂΜL (ref 0.6–4.47)
LYMPHOCYTES NFR BLD AUTO: 35 % (ref 14–44)
MCH RBC QN AUTO: 26.7 PG (ref 26.8–34.3)
MCHC RBC AUTO-ENTMCNC: 30.1 G/DL (ref 31.4–37.4)
MCV RBC AUTO: 89 FL (ref 82–98)
MONOCYTES # BLD AUTO: 0.63 THOUSAND/ÂΜL (ref 0.17–1.22)
MONOCYTES NFR BLD AUTO: 10 % (ref 4–12)
NEUTROPHILS # BLD AUTO: 3.29 THOUSANDS/ÂΜL (ref 1.85–7.62)
NEUTS SEG NFR BLD AUTO: 51 % (ref 43–75)
NONHDLC SERPL-MCNC: 56 MG/DL
NRBC BLD AUTO-RTO: 0 /100 WBCS
PLATELET # BLD AUTO: 197 THOUSANDS/UL (ref 149–390)
PMV BLD AUTO: 14 FL (ref 8.9–12.7)
POTASSIUM SERPL-SCNC: 4.1 MMOL/L (ref 3.5–5.3)
PROT SERPL-MCNC: 7.1 G/DL (ref 6.4–8.4)
RBC # BLD AUTO: 5.05 MILLION/UL (ref 3.88–5.62)
SODIUM SERPL-SCNC: 141 MMOL/L (ref 135–147)
TRIGL SERPL-MCNC: 57 MG/DL
WBC # BLD AUTO: 6.37 THOUSAND/UL (ref 4.31–10.16)

## 2023-08-18 ENCOUNTER — HOSPITAL ENCOUNTER (OUTPATIENT)
Dept: RADIOLOGY | Facility: HOSPITAL | Age: 68
Discharge: HOME | End: 2023-08-18
Payer: COMMERCIAL

## 2023-08-18 DIAGNOSIS — I65.22 SYMPTOMATIC STENOSIS OF LEFT CAROTID ARTERY: ICD-10-CM

## 2023-08-18 PROCEDURE — 93880 EXTRACRANIAL BILAT STUDY: CPT | Performed by: SURGERY

## 2023-08-18 PROCEDURE — 93880 EXTRACRANIAL BILAT STUDY: CPT
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
## 2023-10-06 ENCOUNTER — OFFICE VISIT (OUTPATIENT)
Dept: VASCULAR SURGERY | Facility: CLINIC | Age: 68
End: 2023-10-06
Payer: COMMERCIAL

## 2023-10-06 VITALS
HEIGHT: 69 IN | DIASTOLIC BLOOD PRESSURE: 64 MMHG | BODY MASS INDEX: 25.03 KG/M2 | WEIGHT: 169 LBS | HEART RATE: 66 BPM | SYSTOLIC BLOOD PRESSURE: 134 MMHG

## 2023-10-06 DIAGNOSIS — I65.22 SYMPTOMATIC STENOSIS OF LEFT CAROTID ARTERY: Primary | ICD-10-CM

## 2023-10-06 PROCEDURE — 99213 OFFICE O/P EST LOW 20 MIN: CPT | Performed by: SURGERY

## 2023-10-06 RX ORDER — METFORMIN HYDROCHLORIDE 750 MG/1
TABLET, EXTENDED RELEASE ORAL
COMMUNITY
Start: 2023-09-11

## 2023-10-06 RX ORDER — EMPAGLIFLOZIN 25 MG/1
TABLET, FILM COATED ORAL
COMMUNITY
Start: 2023-09-11

## 2023-10-06 NOTE — PATIENT INSTRUCTIONS
Symptomatic stenosis of left carotid artery  1. History of high-grade left carotid artery stenosis with associated TIA treated with carotid endarterectomy 3/22/2021. He is doing well in this regard. We discussed the findings on recent duplex evaluation which show no significant residual/restenosis. At this point no further work-up or intervention is necessary other than continued antiplatelet and statin therapy along with risk factor modification. We will plan standard annual duplex follow-up. 2.  Asymptomatic mild right carotid artery stenosis. No further intervention is necessary other than medical management and annual follow-up as noted above.

## 2023-10-06 NOTE — ASSESSMENT & PLAN NOTE
1.  History of high-grade left carotid artery stenosis with associated TIA treated with carotid endarterectomy 3/22/2021. He is doing well in this regard. We discussed the findings on recent duplex evaluation which show no significant residual/restenosis. At this point no further work-up or intervention is necessary other than continued antiplatelet and statin therapy along with risk factor modification. We will plan standard annual duplex follow-up. 2.  Asymptomatic mild right carotid artery stenosis. No further intervention is necessary other than medical management and annual follow-up as noted above.

## 2023-10-06 NOTE — LETTER
October 6, 2023     Alba Saab MD  [address] [address]    Patient: Sherie Sultana   YOB: 1955   Date of Visit: 10/6/2023       Dear Dr. Tona Odonnell: Thank you for referring Bea Cranker to me for evaluation. Below are the relevant portions of my assessment and plan of care. Symptomatic stenosis of left carotid artery  1. History of high-grade left carotid artery stenosis with associated TIA treated with carotid endarterectomy 3/22/2021. He is doing well in this regard. We discussed the findings on recent duplex evaluation which show no significant residual/restenosis. At this point no further work-up or intervention is necessary other than continued antiplatelet and statin therapy along with risk factor modification. We will plan standard annual duplex follow-up. 2.  Asymptomatic mild right carotid artery stenosis. No further intervention is necessary other than medical management and annual follow-up as noted above. If you have questions, please do not hesitate to call me. I look forward to following Nicole Dewitt along with you.          Sincerely,        Triston Duron MD        CC: No Recipients
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
## 2023-12-08 ENCOUNTER — TRANSCRIBE ORDERS (OUTPATIENT)
Dept: LAB | Facility: CLINIC | Age: 68
End: 2023-12-08

## 2023-12-08 ENCOUNTER — APPOINTMENT (OUTPATIENT)
Dept: LAB | Facility: CLINIC | Age: 68
End: 2023-12-08
Payer: COMMERCIAL

## 2023-12-08 DIAGNOSIS — N40.0 ENLARGED PROSTATE: ICD-10-CM

## 2023-12-08 DIAGNOSIS — I25.10 DISEASE OF CARDIOVASCULAR SYSTEM: ICD-10-CM

## 2023-12-08 DIAGNOSIS — D68.2 HEREDITARY COAGULATION FACTOR DEFICIENCY (HCC): ICD-10-CM

## 2023-12-08 DIAGNOSIS — I10 ESSENTIAL HYPERTENSION, BENIGN: ICD-10-CM

## 2023-12-08 DIAGNOSIS — E11.69 DIABETES MELLITUS ASSOCIATED WITH HORMONAL ETIOLOGY (HCC): ICD-10-CM

## 2023-12-08 DIAGNOSIS — E11.69 DIABETES MELLITUS ASSOCIATED WITH HORMONAL ETIOLOGY (HCC): Primary | ICD-10-CM

## 2023-12-08 LAB
ALBUMIN SERPL BCP-MCNC: 4.4 G/DL (ref 3.5–5)
ALP SERPL-CCNC: 48 U/L (ref 34–104)
ALT SERPL W P-5'-P-CCNC: 29 U/L (ref 7–52)
ANION GAP SERPL CALCULATED.3IONS-SCNC: 7 MMOL/L
AST SERPL W P-5'-P-CCNC: 24 U/L (ref 13–39)
BASOPHILS # BLD AUTO: 0.06 THOUSANDS/ÂΜL (ref 0–0.1)
BASOPHILS NFR BLD AUTO: 1 % (ref 0–1)
BILIRUB SERPL-MCNC: 0.63 MG/DL (ref 0.2–1)
BUN SERPL-MCNC: 24 MG/DL (ref 5–25)
CALCIUM SERPL-MCNC: 11 MG/DL (ref 8.4–10.2)
CHLORIDE SERPL-SCNC: 103 MMOL/L (ref 96–108)
CO2 SERPL-SCNC: 30 MMOL/L (ref 21–32)
CREAT SERPL-MCNC: 1.29 MG/DL (ref 0.6–1.3)
EOSINOPHIL # BLD AUTO: 0.2 THOUSAND/ÂΜL (ref 0–0.61)
EOSINOPHIL NFR BLD AUTO: 3 % (ref 0–6)
ERYTHROCYTE [DISTWIDTH] IN BLOOD BY AUTOMATED COUNT: 15.1 % (ref 11.6–15.1)
EST. AVERAGE GLUCOSE BLD GHB EST-MCNC: 180 MG/DL
GFR SERPL CREATININE-BSD FRML MDRD: 56 ML/MIN/1.73SQ M
GLUCOSE P FAST SERPL-MCNC: 138 MG/DL (ref 65–99)
HBA1C MFR BLD: 7.9 %
HCT VFR BLD AUTO: 44.7 % (ref 36.5–49.3)
HGB BLD-MCNC: 13.6 G/DL (ref 12–17)
IMM GRANULOCYTES # BLD AUTO: 0.03 THOUSAND/UL (ref 0–0.2)
IMM GRANULOCYTES NFR BLD AUTO: 1 % (ref 0–2)
LYMPHOCYTES # BLD AUTO: 2.02 THOUSANDS/ÂΜL (ref 0.6–4.47)
LYMPHOCYTES NFR BLD AUTO: 32 % (ref 14–44)
MCH RBC QN AUTO: 26.5 PG (ref 26.8–34.3)
MCHC RBC AUTO-ENTMCNC: 30.4 G/DL (ref 31.4–37.4)
MCV RBC AUTO: 87 FL (ref 82–98)
MONOCYTES # BLD AUTO: 0.61 THOUSAND/ÂΜL (ref 0.17–1.22)
MONOCYTES NFR BLD AUTO: 10 % (ref 4–12)
NEUTROPHILS # BLD AUTO: 3.37 THOUSANDS/ÂΜL (ref 1.85–7.62)
NEUTS SEG NFR BLD AUTO: 53 % (ref 43–75)
NRBC BLD AUTO-RTO: 0 /100 WBCS
PLATELET # BLD AUTO: 181 THOUSANDS/UL (ref 149–390)
PMV BLD AUTO: 14.1 FL (ref 8.9–12.7)
POTASSIUM SERPL-SCNC: 4.1 MMOL/L (ref 3.5–5.3)
PROT SERPL-MCNC: 6.6 G/DL (ref 6.4–8.4)
RBC # BLD AUTO: 5.13 MILLION/UL (ref 3.88–5.62)
SODIUM SERPL-SCNC: 140 MMOL/L (ref 135–147)
TSH SERPL DL<=0.05 MIU/L-ACNC: 1.84 UIU/ML (ref 0.45–4.5)
WBC # BLD AUTO: 6.29 THOUSAND/UL (ref 4.31–10.16)

## 2023-12-08 PROCEDURE — 36415 COLL VENOUS BLD VENIPUNCTURE: CPT

## 2023-12-08 PROCEDURE — 80053 COMPREHEN METABOLIC PANEL: CPT

## 2023-12-08 PROCEDURE — 85025 COMPLETE CBC W/AUTO DIFF WBC: CPT

## 2023-12-08 PROCEDURE — 83036 HEMOGLOBIN GLYCOSYLATED A1C: CPT

## 2023-12-08 PROCEDURE — 84443 ASSAY THYROID STIM HORMONE: CPT

## 2023-12-15 ENCOUNTER — APPOINTMENT (OUTPATIENT)
Dept: LAB | Facility: HOSPITAL | Age: 68
End: 2023-12-15
Payer: COMMERCIAL

## 2023-12-29 ENCOUNTER — HOSPITAL ENCOUNTER (EMERGENCY)
Facility: HOSPITAL | Age: 68
Discharge: HOME | End: 2023-12-29
Attending: EMERGENCY MEDICINE
Payer: COMMERCIAL

## 2023-12-29 ENCOUNTER — APPOINTMENT (EMERGENCY)
Dept: RADIOLOGY | Facility: HOSPITAL | Age: 68
End: 2023-12-29
Payer: COMMERCIAL

## 2023-12-29 VITALS
HEART RATE: 71 BPM | TEMPERATURE: 97.3 F | DIASTOLIC BLOOD PRESSURE: 79 MMHG | RESPIRATION RATE: 20 BRPM | OXYGEN SATURATION: 100 % | SYSTOLIC BLOOD PRESSURE: 191 MMHG

## 2023-12-29 DIAGNOSIS — S49.92XA INJURY OF LEFT SHOULDER, INITIAL ENCOUNTER: Primary | ICD-10-CM

## 2023-12-29 PROCEDURE — 99283 EMERGENCY DEPT VISIT LOW MDM: CPT

## 2023-12-29 PROCEDURE — 73030 X-RAY EXAM OF SHOULDER: CPT

## 2023-12-29 PROCEDURE — 73060 X-RAY EXAM OF HUMERUS: CPT

## 2023-12-29 PROCEDURE — 99284 EMERGENCY DEPT VISIT MOD MDM: CPT | Performed by: EMERGENCY MEDICINE

## 2023-12-29 RX ORDER — OXYCODONE HYDROCHLORIDE 5 MG/1
5 TABLET ORAL ONCE
Status: COMPLETED | OUTPATIENT
Start: 2023-12-29 | End: 2023-12-29

## 2023-12-29 RX ORDER — OXYCODONE HYDROCHLORIDE 5 MG/1
5 TABLET ORAL EVERY 4 HOURS PRN
Qty: 5 TABLET | Refills: 0 | Status: SHIPPED | OUTPATIENT
Start: 2023-12-29

## 2023-12-29 RX ORDER — ACETAMINOPHEN 325 MG/1
975 TABLET ORAL ONCE
Status: COMPLETED | OUTPATIENT
Start: 2023-12-29 | End: 2023-12-29

## 2023-12-29 RX ADMIN — OXYCODONE HYDROCHLORIDE 5 MG: 5 TABLET ORAL at 19:34

## 2023-12-29 RX ADMIN — ACETAMINOPHEN 975 MG: 325 TABLET ORAL at 19:34
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
## 2023-12-30 NOTE — DISCHARGE INSTRUCTIONS
Your x-ray today was normal.  If you continue to have significant pain after the next few days, we have provided information to call and schedule follow-up with orthopedic surgery.  We have provided you with a sling which you can wear for comfort.  Make sure you take your arm out of the sling and range your shoulder a few times a day.  Take Tylenol for pain.  We have prescribed several oxycodone for severe breakthrough pain.  If you have any severe pain that is not responsive to this medication, new weakness or numbness in your arm, return to the emergency department.

## 2023-12-31 ENCOUNTER — TELEPHONE (OUTPATIENT)
Dept: OTHER | Facility: OTHER | Age: 68
End: 2023-12-31

## 2023-12-31 NOTE — TELEPHONE ENCOUNTER
Caller: Renetta wife     Doctor: Former pt of Dr Sosa     Reason for call: Pt has a referral for ortho care from a f/u in the ED. Pt called in to      Call back#: [phone]

## 2024-01-02 ENCOUNTER — TELEPHONE (OUTPATIENT)
Dept: OBGYN CLINIC | Facility: HOSPITAL | Age: 69
End: 2024-01-02

## 2024-01-02 ENCOUNTER — HOSPITAL ENCOUNTER (OUTPATIENT)
Dept: MRI IMAGING | Facility: HOSPITAL | Age: 69
Discharge: HOME | End: 2024-01-02
Payer: COMMERCIAL

## 2024-01-02 ENCOUNTER — OFFICE VISIT (OUTPATIENT)
Dept: OBGYN CLINIC | Facility: CLINIC | Age: 69
End: 2024-01-02
Payer: COMMERCIAL

## 2024-01-02 ENCOUNTER — TELEPHONE (OUTPATIENT)
Age: 69
End: 2024-01-02

## 2024-01-02 VITALS
HEART RATE: 67 BPM | BODY MASS INDEX: 25.03 KG/M2 | WEIGHT: 169 LBS | SYSTOLIC BLOOD PRESSURE: 143 MMHG | HEIGHT: 69 IN | DIASTOLIC BLOOD PRESSURE: 81 MMHG

## 2024-01-02 DIAGNOSIS — M24.812 INTERNAL DERANGEMENT OF LEFT SHOULDER: Primary | ICD-10-CM

## 2024-01-02 DIAGNOSIS — E11.9 TYPE 2 DIABETES MELLITUS WITHOUT COMPLICATION, UNSPECIFIED WHETHER LONG TERM INSULIN USE (HCC): ICD-10-CM

## 2024-01-02 DIAGNOSIS — M24.812 INTERNAL DERANGEMENT OF LEFT SHOULDER: ICD-10-CM

## 2024-01-02 PROCEDURE — 99214 OFFICE O/P EST MOD 30 MIN: CPT | Performed by: ORTHOPAEDIC SURGERY

## 2024-01-02 PROCEDURE — G1004 CDSM NDSC: HCPCS

## 2024-01-02 PROCEDURE — 73221 MRI JOINT UPR EXTREM W/O DYE: CPT

## 2024-01-02 RX ORDER — ALPRAZOLAM 0.5 MG/1
0.5 TABLET ORAL ONCE
Qty: 1 TABLET | Refills: 0 | Status: SHIPPED | OUTPATIENT
Start: 2024-01-02 | End: 2024-01-02

## 2024-01-02 RX ORDER — NALOXONE HYDROCHLORIDE 4 MG/.1ML
SPRAY NASAL
Qty: 1 EACH | Refills: 1 | Status: SHIPPED | OUTPATIENT
Start: 2024-01-02 | End: 2025-01-01

## 2024-01-02 NOTE — TELEPHONE ENCOUNTER
Caller: [facility]     Doctor: Dawit Sam / Jake / Sundar     Reason for call: Pharmacist will need Rx sent over for Narcan to go along with the oxycodone / Alprazolam prescriptions.      Call back#: [phone]

## 2024-01-02 NOTE — TELEPHONE ENCOUNTER
Caller: patient     Doctor: David     Reason for call: patient calling for something to calm him down for his MRI, the med currently prescribed interacts with Oxycodone,  can something else please be prescribed?    Preferred pharmacy is [facility] in [place]     Call back#: [phone]
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
## 2024-01-04 ENCOUNTER — TELEPHONE (OUTPATIENT)
Dept: OBGYN CLINIC | Facility: HOSPITAL | Age: 69
End: 2024-01-04

## 2024-01-04 NOTE — TELEPHONE ENCOUNTER
Caller: Patient    Doctor: Kian Joseph    Reason for call: Patient is scheduled for an appointment tomorrow, states he is not feeling well and is asking if you would do a phone visit?    States he is negative for COVID but wants to be safe.     Call back#: [phone]

## 2024-01-05 ENCOUNTER — OFFICE VISIT (OUTPATIENT)
Age: 69
End: 2024-01-05
Payer: COMMERCIAL

## 2024-01-05 VITALS
HEIGHT: 69 IN | BODY MASS INDEX: 25.03 KG/M2 | DIASTOLIC BLOOD PRESSURE: 80 MMHG | HEART RATE: 76 BPM | SYSTOLIC BLOOD PRESSURE: 160 MMHG | WEIGHT: 169 LBS

## 2024-01-05 DIAGNOSIS — S46.012D TRAUMATIC COMPLETE TEAR OF LEFT ROTATOR CUFF, SUBSEQUENT ENCOUNTER: Primary | ICD-10-CM

## 2024-01-05 PROCEDURE — 99214 OFFICE O/P EST MOD 30 MIN: CPT | Performed by: ORTHOPAEDIC SURGERY
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
## 2024-01-11 ENCOUNTER — TELEPHONE (OUTPATIENT)
Dept: HEMATOLOGY ONCOLOGY | Facility: MEDICAL CENTER | Age: 69
End: 2024-01-11

## 2024-01-11 ENCOUNTER — TELEPHONE (OUTPATIENT)
Dept: HEMATOLOGY ONCOLOGY | Facility: CLINIC | Age: 69
End: 2024-01-11

## 2024-01-11 NOTE — TELEPHONE ENCOUNTER
Patient Call    Who are you speaking with? Spouse    If it is not the patient, are they listed on an active communication consent form? Yes   What is the reason for this call? Patients wife states he is going to have rotator cuff surgery and this doctor needs specific information from Dr. Franz about his care in the past and state what type of treatment he should be given, such as blood thinners     Does this require a call back? Yes   If a call back is required, please list best call back number [phone]   If a call back is required, advise that a message will be forwarded to their care team and someone will return their call as soon as possible.   Did you relay this information to the patient? Yes

## 2024-01-11 NOTE — TELEPHONE ENCOUNTER
Patient has not been seen since 2018  Has history of factor 5  Patient needs rotator cuff surgery  Will make appt and call patient  Patient aware of plan

## 2024-01-17 ENCOUNTER — OFFICE VISIT (OUTPATIENT)
Dept: HEMATOLOGY ONCOLOGY | Facility: MEDICAL CENTER | Age: 69
End: 2024-01-17
Payer: COMMERCIAL

## 2024-01-17 VITALS
WEIGHT: 170 LBS | RESPIRATION RATE: 19 BRPM | DIASTOLIC BLOOD PRESSURE: 74 MMHG | HEART RATE: 74 BPM | BODY MASS INDEX: 25.18 KG/M2 | SYSTOLIC BLOOD PRESSURE: 136 MMHG | TEMPERATURE: 97.6 F | OXYGEN SATURATION: 98 % | HEIGHT: 69 IN

## 2024-01-17 DIAGNOSIS — I82.4Y9 DEEP VEIN THROMBOSIS (DVT) OF PROXIMAL LOWER EXTREMITY, UNSPECIFIED CHRONICITY, UNSPECIFIED LATERALITY (HCC): Primary | ICD-10-CM

## 2024-01-17 PROCEDURE — 99204 OFFICE O/P NEW MOD 45 MIN: CPT | Performed by: INTERNAL MEDICINE
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
## 2024-02-12 ENCOUNTER — APPOINTMENT (OUTPATIENT)
Dept: LAB | Facility: HOSPITAL | Age: 69
End: 2024-02-12
Payer: COMMERCIAL

## 2024-02-12 DIAGNOSIS — R31.9 HEMATURIA SYNDROME: ICD-10-CM

## 2024-02-12 LAB
ALBUMIN SERPL BCP-MCNC: 4.6 G/DL (ref 3.5–5)
ALP SERPL-CCNC: 65 U/L (ref 34–104)
ALT SERPL W P-5'-P-CCNC: 31 U/L (ref 7–52)
ANION GAP SERPL CALCULATED.3IONS-SCNC: 7 MMOL/L
AST SERPL W P-5'-P-CCNC: 20 U/L (ref 13–39)
BACTERIA UR QL AUTO: ABNORMAL /HPF
BASOPHILS # BLD AUTO: 0.07 THOUSANDS/ÂΜL (ref 0–0.1)
BASOPHILS NFR BLD AUTO: 1 % (ref 0–1)
BILIRUB SERPL-MCNC: 0.37 MG/DL (ref 0.2–1)
BILIRUB UR QL STRIP: NEGATIVE
BUN SERPL-MCNC: 20 MG/DL (ref 5–25)
CALCIUM SERPL-MCNC: 10.1 MG/DL (ref 8.4–10.2)
CHLORIDE SERPL-SCNC: 104 MMOL/L (ref 96–108)
CLARITY UR: ABNORMAL
CO2 SERPL-SCNC: 30 MMOL/L (ref 21–32)
COLOR UR: YELLOW
CREAT SERPL-MCNC: 1.1 MG/DL (ref 0.6–1.3)
EOSINOPHIL # BLD AUTO: 0.2 THOUSAND/ÂΜL (ref 0–0.61)
EOSINOPHIL NFR BLD AUTO: 3 % (ref 0–6)
ERYTHROCYTE [DISTWIDTH] IN BLOOD BY AUTOMATED COUNT: 16.4 % (ref 11.6–15.1)
GFR SERPL CREATININE-BSD FRML MDRD: 68 ML/MIN/1.73SQ M
GLUCOSE SERPL-MCNC: 91 MG/DL (ref 65–140)
GLUCOSE UR STRIP-MCNC: ABNORMAL MG/DL
HCT VFR BLD AUTO: 45.2 % (ref 36.5–49.3)
HGB BLD-MCNC: 13.6 G/DL (ref 12–17)
HGB UR QL STRIP.AUTO: ABNORMAL
IMM GRANULOCYTES # BLD AUTO: 0.02 THOUSAND/UL (ref 0–0.2)
IMM GRANULOCYTES NFR BLD AUTO: 0 % (ref 0–2)
KETONES UR STRIP-MCNC: NEGATIVE MG/DL
LEUKOCYTE ESTERASE UR QL STRIP: NEGATIVE
LYMPHOCYTES # BLD AUTO: 2.78 THOUSANDS/ÂΜL (ref 0.6–4.47)
LYMPHOCYTES NFR BLD AUTO: 36 % (ref 14–44)
MCH RBC QN AUTO: 27 PG (ref 26.8–34.3)
MCHC RBC AUTO-ENTMCNC: 30.1 G/DL (ref 31.4–37.4)
MCV RBC AUTO: 90 FL (ref 82–98)
MONOCYTES # BLD AUTO: 0.74 THOUSAND/ÂΜL (ref 0.17–1.22)
MONOCYTES NFR BLD AUTO: 10 % (ref 4–12)
NEUTROPHILS # BLD AUTO: 3.84 THOUSANDS/ÂΜL (ref 1.85–7.62)
NEUTS SEG NFR BLD AUTO: 50 % (ref 43–75)
NITRITE UR QL STRIP: NEGATIVE
NON-SQ EPI CELLS URNS QL MICRO: ABNORMAL /HPF
NRBC BLD AUTO-RTO: 0 /100 WBCS
PH UR STRIP.AUTO: 7 [PH]
PLATELET # BLD AUTO: 231 THOUSANDS/UL (ref 149–390)
PMV BLD AUTO: 12.6 FL (ref 8.9–12.7)
POTASSIUM SERPL-SCNC: 3.9 MMOL/L (ref 3.5–5.3)
PROT SERPL-MCNC: 7.3 G/DL (ref 6.4–8.4)
PROT UR STRIP-MCNC: NEGATIVE MG/DL
RBC # BLD AUTO: 5.03 MILLION/UL (ref 3.88–5.62)
RBC #/AREA URNS AUTO: ABNORMAL /HPF
SODIUM SERPL-SCNC: 141 MMOL/L (ref 135–147)
SP GR UR STRIP.AUTO: 1.01 (ref 1–1.03)
UROBILINOGEN UR QL STRIP.AUTO: 0.2 E.U./DL
WBC # BLD AUTO: 7.65 THOUSAND/UL (ref 4.31–10.16)
WBC #/AREA URNS AUTO: ABNORMAL /HPF

## 2024-02-12 PROCEDURE — 85025 COMPLETE CBC W/AUTO DIFF WBC: CPT

## 2024-02-12 PROCEDURE — 87086 URINE CULTURE/COLONY COUNT: CPT

## 2024-02-12 PROCEDURE — 81003 URINALYSIS AUTO W/O SCOPE: CPT

## 2024-02-12 PROCEDURE — 36415 COLL VENOUS BLD VENIPUNCTURE: CPT

## 2024-02-12 PROCEDURE — 80053 COMPREHEN METABOLIC PANEL: CPT

## 2024-02-12 PROCEDURE — 81001 URINALYSIS AUTO W/SCOPE: CPT

## 2024-02-13 ENCOUNTER — HOSPITAL ENCOUNTER (OUTPATIENT)
Dept: RADIOLOGY | Facility: HOSPITAL | Age: 69
Discharge: HOME | End: 2024-02-13
Payer: COMMERCIAL

## 2024-02-13 DIAGNOSIS — R31.9 HEMATURIA, UNSPECIFIED: ICD-10-CM

## 2024-02-13 PROCEDURE — 76770 US EXAM ABDO BACK WALL COMP: CPT

## 2024-02-14 ENCOUNTER — TELEPHONE (OUTPATIENT)
Age: 69
End: 2024-02-14

## 2024-02-14 LAB — BACTERIA UR CULT: NORMAL

## 2024-02-14 NOTE — TELEPHONE ENCOUNTER
New Patient Triage  Please Triage:   New Patient-   What is the reason for the patients appointment?  Abnormal imaging- gross hematuria  with clots     Imaging/Lab Results:   IMPRESSION:     Focal 6 x 6 x 6 mm nodular lesion involving the left bladder wall, direct visualization recommended.     Prostatomegaly and diffuse bladder wall thickening.    Insurance:   Do we accept the pt's insurance or is the patient Self-Pay?  Provider & Plan:  [employer]   Member ID#: [id number]     History  Has the pt had any previous urologist? [facility]     Have pt records been requested? Some in epic     Has the pt had any outside testing done?     Does the pt have a personal history of cancer?: no    Pt would like to go to [place] if possible.    PT can be reached at [phone]

## 2024-02-15 ENCOUNTER — OFFICE VISIT (OUTPATIENT)
Dept: UROLOGY | Facility: CLINIC | Age: 69
End: 2024-02-15
Payer: COMMERCIAL

## 2024-02-15 VITALS
RESPIRATION RATE: 18 BRPM | WEIGHT: 160 LBS | DIASTOLIC BLOOD PRESSURE: 80 MMHG | HEIGHT: 69 IN | HEART RATE: 78 BPM | SYSTOLIC BLOOD PRESSURE: 132 MMHG | OXYGEN SATURATION: 98 % | BODY MASS INDEX: 23.7 KG/M2

## 2024-02-15 DIAGNOSIS — R31.0 GROSS HEMATURIA: Primary | ICD-10-CM

## 2024-02-15 DIAGNOSIS — E11.8 TYPE 2 DIABETES MELLITUS WITH COMPLICATION (HCC): ICD-10-CM

## 2024-02-15 LAB
SL AMB  POCT GLUCOSE, UA: 2000
SL AMB LEUKOCYTE ESTERASE,UA: NORMAL
SL AMB POCT BILIRUBIN,UA: NORMAL
SL AMB POCT BLOOD,UA: NORMAL
SL AMB POCT CLARITY,UA: CLEAR
SL AMB POCT COLOR,UA: YELLOW
SL AMB POCT KETONES,UA: NORMAL
SL AMB POCT NITRITE,UA: NORMAL
SL AMB POCT PH,UA: 5
SL AMB POCT SPECIFIC GRAVITY,UA: 1.01
SL AMB POCT URINE PROTEIN: NORMAL
SL AMB POCT UROBILINOGEN: 0.2

## 2024-02-15 PROCEDURE — 99203 OFFICE O/P NEW LOW 30 MIN: CPT | Performed by: PHYSICIAN ASSISTANT

## 2024-02-15 PROCEDURE — 81002 URINALYSIS NONAUTO W/O SCOPE: CPT | Performed by: PHYSICIAN ASSISTANT

## 2024-02-15 PROCEDURE — 88112 CYTOPATH CELL ENHANCE TECH: CPT | Performed by: PATHOLOGY

## 2024-02-15 RX ORDER — CELECOXIB 200 MG/1
200 CAPSULE ORAL 2 TIMES DAILY
COMMUNITY

## 2024-02-15 RX ORDER — TRAMADOL HYDROCHLORIDE 50 MG/1
TABLET ORAL
COMMUNITY
Start: 2024-01-23

## 2024-02-15 RX ORDER — TAMSULOSIN HYDROCHLORIDE 0.4 MG/1
0.4 CAPSULE ORAL DAILY
COMMUNITY
Start: 2024-02-12
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
## 2024-02-15 NOTE — TELEPHONE ENCOUNTER
Appears patient scheduled for appt today with [facility]. Should have asap cystoscopy. Can be discussed at appt today

## 2024-02-16 ENCOUNTER — NURSE TRIAGE (OUTPATIENT)
Age: 69
End: 2024-02-16

## 2024-02-16 DIAGNOSIS — R31.0 GROSS HEMATURIA: Primary | ICD-10-CM

## 2024-02-16 NOTE — TELEPHONE ENCOUNTER
Client has CT scheduled for next Wednesday. PCP states the ordering physician needs to order repeat BMP to check creatinine 2 days after the CT scan.  Please advise.

## 2024-02-20 PROCEDURE — 88112 CYTOPATH CELL ENHANCE TECH: CPT | Performed by: PATHOLOGY

## 2024-02-21 ENCOUNTER — HOSPITAL ENCOUNTER (OUTPATIENT)
Dept: RADIOLOGY | Facility: HOSPITAL | Age: 69
Discharge: HOME | End: 2024-02-21
Payer: COMMERCIAL

## 2024-02-21 DIAGNOSIS — R31.0 GROSS HEMATURIA: ICD-10-CM

## 2024-02-21 PROCEDURE — 74178 CT ABD&PLV WO CNTR FLWD CNTR: CPT

## 2024-02-21 PROCEDURE — G1004 CDSM NDSC: HCPCS

## 2024-02-21 RX ADMIN — IOHEXOL 100 ML: 350 INJECTION, SOLUTION INTRAVENOUS at 13:06

## 2024-02-22 NOTE — TELEPHONE ENCOUNTER
Pt called to report that he had CT scan done. Wants to know next steps. States was told to call office once Ct was done to see if needs sooner cysto

## 2024-02-23 ENCOUNTER — APPOINTMENT (OUTPATIENT)
Dept: LAB | Facility: HOSPITAL | Age: 69
End: 2024-02-23
Payer: COMMERCIAL

## 2024-02-23 DIAGNOSIS — R31.0 GROSS HEMATURIA: ICD-10-CM

## 2024-02-23 LAB
ANION GAP SERPL CALCULATED.3IONS-SCNC: 7 MMOL/L
BUN SERPL-MCNC: 20 MG/DL (ref 5–25)
CALCIUM SERPL-MCNC: 9.2 MG/DL (ref 8.4–10.2)
CHLORIDE SERPL-SCNC: 103 MMOL/L (ref 96–108)
CO2 SERPL-SCNC: 30 MMOL/L (ref 21–32)
CREAT SERPL-MCNC: 1.19 MG/DL (ref 0.6–1.3)
GFR SERPL CREATININE-BSD FRML MDRD: 62 ML/MIN/1.73SQ M
GLUCOSE P FAST SERPL-MCNC: 120 MG/DL (ref 65–99)
POTASSIUM SERPL-SCNC: 3.9 MMOL/L (ref 3.5–5.3)
SODIUM SERPL-SCNC: 140 MMOL/L (ref 135–147)

## 2024-02-23 PROCEDURE — 36415 COLL VENOUS BLD VENIPUNCTURE: CPT

## 2024-02-23 PROCEDURE — 80048 BASIC METABOLIC PNL TOTAL CA: CPT

## 2024-02-27 ENCOUNTER — APPOINTMENT (OUTPATIENT)
Dept: LAB | Facility: HOSPITAL | Age: 69
End: 2024-02-27
Payer: COMMERCIAL

## 2024-02-27 DIAGNOSIS — R97.20 ELEVATED PSA: ICD-10-CM

## 2024-02-27 PROCEDURE — 84154 ASSAY OF PSA FREE: CPT

## 2024-02-27 PROCEDURE — 84153 ASSAY OF PSA TOTAL: CPT

## 2024-02-27 PROCEDURE — 36415 COLL VENOUS BLD VENIPUNCTURE: CPT

## 2024-02-29 LAB
PSA FREE MFR SERPL: 11.3 %
PSA FREE SERPL-MCNC: 0.72 NG/ML
PSA SERPL-MCNC: 6.4 NG/ML (ref 0–4)

## 2024-03-15 ENCOUNTER — LAB REQUISITION (OUTPATIENT)
Dept: LAB | Facility: HOSPITAL | Age: 69
End: 2024-03-15
Payer: COMMERCIAL

## 2024-03-15 DIAGNOSIS — C67.4 MALIGNANT NEOPLASM OF POSTERIOR WALL OF BLADDER (HCC): ICD-10-CM

## 2024-03-15 DIAGNOSIS — R31.0 GROSS HEMATURIA: ICD-10-CM

## 2024-03-15 PROCEDURE — 88342 IMHCHEM/IMCYTCHM 1ST ANTB: CPT | Performed by: PATHOLOGY

## 2024-03-15 PROCEDURE — 88307 TISSUE EXAM BY PATHOLOGIST: CPT | Performed by: PATHOLOGY

## 2024-03-15 PROCEDURE — 88305 TISSUE EXAM BY PATHOLOGIST: CPT | Performed by: PATHOLOGY

## 2024-03-15 PROCEDURE — 88341 IMHCHEM/IMCYTCHM EA ADD ANTB: CPT | Performed by: PATHOLOGY

## 2024-03-19 PROCEDURE — 88305 TISSUE EXAM BY PATHOLOGIST: CPT | Performed by: PATHOLOGY

## 2024-03-19 PROCEDURE — 88341 IMHCHEM/IMCYTCHM EA ADD ANTB: CPT | Performed by: PATHOLOGY

## 2024-03-19 PROCEDURE — 88307 TISSUE EXAM BY PATHOLOGIST: CPT | Performed by: PATHOLOGY

## 2024-03-19 PROCEDURE — 88342 IMHCHEM/IMCYTCHM 1ST ANTB: CPT | Performed by: PATHOLOGY

## 2024-03-21 ENCOUNTER — APPOINTMENT (OUTPATIENT)
Dept: LAB | Facility: HOSPITAL | Age: 69
End: 2024-03-21
Payer: COMMERCIAL

## 2024-03-21 DIAGNOSIS — I10 ESSENTIAL HYPERTENSION, MALIGNANT: ICD-10-CM

## 2024-03-21 DIAGNOSIS — E11.69 DIABETES MELLITUS ASSOCIATED WITH HORMONAL ETIOLOGY (HCC): ICD-10-CM

## 2024-03-21 DIAGNOSIS — I25.10 ATHEROSCLEROSIS OF NATIVE CORONARY ARTERY OF NATIVE HEART, UNSPECIFIED WHETHER ANGINA PRESENT: ICD-10-CM

## 2024-03-21 DIAGNOSIS — D68.2 HEREDITARY COAGULATION FACTOR DEFICIENCY (HCC): ICD-10-CM

## 2024-03-21 LAB
ALBUMIN SERPL BCP-MCNC: 4.2 G/DL (ref 3.5–5)
ALP SERPL-CCNC: 58 U/L (ref 34–104)
ALT SERPL W P-5'-P-CCNC: 25 U/L (ref 7–52)
ANION GAP SERPL CALCULATED.3IONS-SCNC: 6 MMOL/L (ref 4–13)
AST SERPL W P-5'-P-CCNC: 20 U/L (ref 13–39)
BASOPHILS # BLD AUTO: 0.06 THOUSANDS/ÂΜL (ref 0–0.1)
BASOPHILS NFR BLD AUTO: 1 % (ref 0–1)
BILIRUB SERPL-MCNC: 0.62 MG/DL (ref 0.2–1)
BUN SERPL-MCNC: 22 MG/DL (ref 5–25)
CALCIUM SERPL-MCNC: 9.6 MG/DL (ref 8.4–10.2)
CHLORIDE SERPL-SCNC: 104 MMOL/L (ref 96–108)
CO2 SERPL-SCNC: 29 MMOL/L (ref 21–32)
CREAT SERPL-MCNC: 1.25 MG/DL (ref 0.6–1.3)
EOSINOPHIL # BLD AUTO: 0.17 THOUSAND/ÂΜL (ref 0–0.61)
EOSINOPHIL NFR BLD AUTO: 2 % (ref 0–6)
ERYTHROCYTE [DISTWIDTH] IN BLOOD BY AUTOMATED COUNT: 15.8 % (ref 11.6–15.1)
GFR SERPL CREATININE-BSD FRML MDRD: 58 ML/MIN/1.73SQ M
GLUCOSE P FAST SERPL-MCNC: 111 MG/DL (ref 65–99)
HCT VFR BLD AUTO: 46 % (ref 36.5–49.3)
HGB BLD-MCNC: 14.1 G/DL (ref 12–17)
IMM GRANULOCYTES # BLD AUTO: 0.02 THOUSAND/UL (ref 0–0.2)
IMM GRANULOCYTES NFR BLD AUTO: 0 % (ref 0–2)
LYMPHOCYTES # BLD AUTO: 2 THOUSANDS/ÂΜL (ref 0.6–4.47)
LYMPHOCYTES NFR BLD AUTO: 28 % (ref 14–44)
MCH RBC QN AUTO: 27.3 PG (ref 26.8–34.3)
MCHC RBC AUTO-ENTMCNC: 30.7 G/DL (ref 31.4–37.4)
MCV RBC AUTO: 89 FL (ref 82–98)
MONOCYTES # BLD AUTO: 0.59 THOUSAND/ÂΜL (ref 0.17–1.22)
MONOCYTES NFR BLD AUTO: 8 % (ref 4–12)
NEUTROPHILS # BLD AUTO: 4.19 THOUSANDS/ÂΜL (ref 1.85–7.62)
NEUTS SEG NFR BLD AUTO: 61 % (ref 43–75)
NRBC BLD AUTO-RTO: 0 /100 WBCS
PLATELET # BLD AUTO: 201 THOUSANDS/UL (ref 149–390)
PMV BLD AUTO: 12.6 FL (ref 8.9–12.7)
POTASSIUM SERPL-SCNC: 4.2 MMOL/L (ref 3.5–5.3)
PROT SERPL-MCNC: 6.8 G/DL (ref 6.4–8.4)
RBC # BLD AUTO: 5.17 MILLION/UL (ref 3.88–5.62)
SODIUM SERPL-SCNC: 139 MMOL/L (ref 135–147)
WBC # BLD AUTO: 7.03 THOUSAND/UL (ref 4.31–10.16)

## 2024-03-21 PROCEDURE — 36415 COLL VENOUS BLD VENIPUNCTURE: CPT

## 2024-03-21 PROCEDURE — 80053 COMPREHEN METABOLIC PANEL: CPT

## 2024-03-21 PROCEDURE — 85025 COMPLETE CBC W/AUTO DIFF WBC: CPT

## 2024-04-25 ENCOUNTER — APPOINTMENT (OUTPATIENT)
Dept: LAB | Facility: HOSPITAL | Age: 69
End: 2024-04-25
Payer: COMMERCIAL

## 2024-04-25 ENCOUNTER — CONSULT (OUTPATIENT)
Dept: SURGERY | Facility: CLINIC | Age: 69
End: 2024-04-25
Payer: COMMERCIAL

## 2024-04-25 VITALS
OXYGEN SATURATION: 98 % | DIASTOLIC BLOOD PRESSURE: 78 MMHG | SYSTOLIC BLOOD PRESSURE: 144 MMHG | BODY MASS INDEX: 25.62 KG/M2 | TEMPERATURE: 97.3 F | HEIGHT: 69 IN | WEIGHT: 173 LBS | RESPIRATION RATE: 14 BRPM | HEART RATE: 78 BPM

## 2024-04-25 DIAGNOSIS — L02.214 ABSCESS OF RIGHT GROIN: Primary | ICD-10-CM

## 2024-04-25 PROBLEM — E11.9 DIABETES MELLITUS, TYPE 2 (HCC): Status: RESOLVED | Noted: 2019-03-15 | Resolved: 2024-04-25

## 2024-04-25 PROCEDURE — 87205 SMEAR GRAM STAIN: CPT | Performed by: SURGERY

## 2024-04-25 PROCEDURE — 87077 CULTURE AEROBIC IDENTIFY: CPT | Performed by: SURGERY

## 2024-04-25 PROCEDURE — 10060 I&D ABSCESS SIMPLE/SINGLE: CPT | Performed by: SURGERY

## 2024-04-25 PROCEDURE — 87070 CULTURE OTHR SPECIMN AEROBIC: CPT | Performed by: SURGERY

## 2024-04-25 RX ORDER — DOXYCYCLINE 100 MG/1
100 TABLET ORAL 2 TIMES DAILY
COMMUNITY

## 2024-04-25 NOTE — PROGRESS NOTES
"Incision and Drainage    Date/Time: 4/25/2024 8:30 AM    Performed by: Jeff Pillai MD  Authorized by: Jeff Pillai MD  Universal Protocol:  Consent: Written consent obtained.  Risks and benefits: risks, benefits and alternatives were discussed  Consent given by: patient  Time out: Immediately prior to procedure a \"time out\" was called to verify the correct patient, procedure, equipment, support staff and site/side marked as required.  Timeout called at: 4/25/2024 9:00 AM.  Patient understanding: patient states understanding of the procedure being performed  Site marked: the operative site was marked    Patient location:  Clinic  Location:     Type:  Abscess    Location:  Trunk (Right groin crease)  Pre-procedure details:     Skin preparation:  Betadine  Anesthesia (see MAR for exact dosages):     Anesthesia method:  Local infiltration    Local anesthetic:  Lidocaine 1% WITH epi (10 mL with sodium bicarbonate)  Procedure details:     Complexity:  Simple    Incision types:  Single with marsupialization    Scalpel blade:  11    Incision depth:  Subcutaneous    Wound management:  Probed and deloculated    Drainage:  Purulent    Drainage amount:  Moderate    Wound treatment:  Wound left open and packing placed    Packing materials:  1/4 in iodoform gauze    Amount 1/4\" iodoform:  5 inches  Post-procedure details:     Patient tolerance of procedure:  Tolerated well, no immediate complications  Comments:      Pus sent for culture  Dry dressing applied  Patient tolerated procedure well       "

## 2024-04-25 NOTE — PATIENT INSTRUCTIONS
Complete course of antibiotics  Patient may shower tomorrow and change dressings  Use dry gauze dressings and change daily and as needed  Remove packing in 48 hours    If cyst recurs in the future should have excision performed in the office

## 2024-04-26 ENCOUNTER — NURSE TRIAGE (OUTPATIENT)
Dept: SURGERY | Facility: CLINIC | Age: 69
End: 2024-04-26

## 2024-04-26 NOTE — TELEPHONE ENCOUNTER
"Pt of Dr. Pillai,  I&D right groin abscess yesterday.  Pt questioning if he should remove packing today or tomorrow.  Pt has already taken a shower and left packing intact.  RN reviewed the following instructions from visit note with patient:  Patient may shower tomorrow and change dressings.  Use dry gauze dressings and change daily and as needed  Remove packing in 48 hours    Pt states he wants to be sure that provider meant that he should remove packing tomorrow, since instructions allowed him to shower and change outer gauze today. RN advised will confirm with provider and call back.       [phone].    Answer Assessment - Initial Assessment Questions  1. REASON FOR CALL or QUESTION: \"What is your reason for calling today?\" or \"How can I best help you?\" or \"What question do you have that I can help answer?\"      Pt has questions about his dressing change.    Protocols used: Information Only Call - No Triage-ADULT-[place]    "

## 2024-04-26 NOTE — TELEPHONE ENCOUNTER
Going off of last chart notes, he is to remove after 48 hours. Did call pt to let him know okay to wait.

## 2024-04-28 LAB
BACTERIA WND AEROBE CULT: ABNORMAL
GRAM STN SPEC: ABNORMAL

## 2024-04-29 LAB
BACTERIA WND AEROBE CULT: ABNORMAL
BACTERIA WND AEROBE CULT: ABNORMAL
GRAM STN SPEC: ABNORMAL

## 2024-06-21 ENCOUNTER — APPOINTMENT (OUTPATIENT)
Dept: LAB | Facility: HOSPITAL | Age: 69
End: 2024-06-21
Payer: COMMERCIAL

## 2024-06-21 DIAGNOSIS — R97.20 ELEVATED PROSTATE SPECIFIC ANTIGEN (PSA): ICD-10-CM

## 2024-06-21 LAB — PSA SERPL-MCNC: 5.23 NG/ML (ref 0–4)

## 2024-06-21 PROCEDURE — 84153 ASSAY OF PSA TOTAL: CPT

## 2024-06-21 PROCEDURE — 36415 COLL VENOUS BLD VENIPUNCTURE: CPT

## 2024-07-10 ENCOUNTER — NURSE TRIAGE (OUTPATIENT)
Dept: SURGERY | Facility: CLINIC | Age: 69
End: 2024-07-10

## 2024-07-10 ENCOUNTER — OFFICE VISIT (OUTPATIENT)
Dept: SURGERY | Facility: CLINIC | Age: 69
End: 2024-07-10
Payer: COMMERCIAL

## 2024-07-10 VITALS
OXYGEN SATURATION: 98 % | HEART RATE: 76 BPM | BODY MASS INDEX: 24.53 KG/M2 | HEIGHT: 69 IN | WEIGHT: 165.6 LBS | DIASTOLIC BLOOD PRESSURE: 60 MMHG | SYSTOLIC BLOOD PRESSURE: 100 MMHG

## 2024-07-10 DIAGNOSIS — C67.9 MALIGNANT NEOPLASM OF URINARY BLADDER, UNSPECIFIED SITE (HCC): ICD-10-CM

## 2024-07-10 DIAGNOSIS — L72.3 INFECTED SEBACEOUS CYST: Primary | ICD-10-CM

## 2024-07-10 DIAGNOSIS — L08.9 INFECTED SEBACEOUS CYST: Primary | ICD-10-CM

## 2024-07-10 PROCEDURE — 99213 OFFICE O/P EST LOW 20 MIN: CPT | Performed by: SURGERY

## 2024-07-10 RX ORDER — AMOXICILLIN AND CLAVULANATE POTASSIUM 875; 125 MG/1; MG/1
1 TABLET, FILM COATED ORAL EVERY 12 HOURS SCHEDULED
Qty: 14 TABLET | Refills: 0 | Status: SHIPPED | OUTPATIENT
Start: 2024-07-10 | End: 2024-07-17 | Stop reason: ALTCHOICE

## 2024-07-10 NOTE — TELEPHONE ENCOUNTER
"Pt of Dr. Pillai, I/D right groin abscess 4/25/24    REASON FOR CALL: abscess forming again    Pt's wife Alyssia calling.  States pt has an abscess forming in his right groin again.   States area is red and draining light pink drainage and surrounding erythema.  Denies fever.  Appt scheduled today at 2:30 PM.      Reason for Disposition   Spreading redness around the boil and no fever    Answer Assessment - Initial Assessment Questions  1. APPEARANCE of BOIL: \"What does the boil look like?\"       Red, small amount of light pink drainage  2. LOCATION: \"Where is the boil located?\"       Right groin   3. NUMBER: \"How many boils are there?\"       1  4. SIZE: \"How big is the boil?\" (e.g., inches, cm; compare to size of a coin or other object)      Pt not with wife for call  5. ONSET: \"When did the boil start?\"      Yesterday   6. PAIN: \"Is there any pain?\" If Yes, ask: \"How bad is the pain?\"   (Scale 1-10; or mild, moderate, severe)      Pt not with wife for triage  7. FEVER: \"Do you have a fever?\" If Yes, ask: \"What is it, how was it measured, and when did it start?\"       Denies  8. SOURCE: \"Have you been around anyone with boils or other Staph infections?\" \"Have you ever had boils before?\"      Yes, see note. 4/25/24  9. OTHER SYMPTOMS: \"Do you have any other symptoms?\" (e.g., shaking chills, weakness, rash elsewhere on body)      Denies    Protocols used: Boil (Skin Abscess)-ADULT-[place]    "
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
## 2024-07-17 ENCOUNTER — OFFICE VISIT (OUTPATIENT)
Dept: SURGERY | Facility: CLINIC | Age: 69
End: 2024-07-17
Payer: COMMERCIAL

## 2024-07-17 VITALS
HEART RATE: 93 BPM | OXYGEN SATURATION: 98 % | DIASTOLIC BLOOD PRESSURE: 58 MMHG | HEIGHT: 69 IN | SYSTOLIC BLOOD PRESSURE: 110 MMHG | BODY MASS INDEX: 24.29 KG/M2 | TEMPERATURE: 97.5 F | WEIGHT: 164 LBS

## 2024-07-17 DIAGNOSIS — L72.3 INFECTED SEBACEOUS CYST: Primary | ICD-10-CM

## 2024-07-17 DIAGNOSIS — L08.9 INFECTED SEBACEOUS CYST: Primary | ICD-10-CM

## 2024-07-17 PROCEDURE — 99213 OFFICE O/P EST LOW 20 MIN: CPT | Performed by: SURGERY
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
## 2024-08-19 ENCOUNTER — HOSPITAL ENCOUNTER (OUTPATIENT)
Dept: RADIOLOGY | Facility: HOSPITAL | Age: 69
Discharge: HOME | End: 2024-08-19
Payer: COMMERCIAL

## 2024-08-19 ENCOUNTER — HOSPITAL ENCOUNTER (OUTPATIENT)
Dept: RADIOLOGY | Facility: HOSPITAL | Age: 69
Discharge: HOME | End: 2024-08-19
Attending: SURGERY
Payer: COMMERCIAL

## 2024-08-19 DIAGNOSIS — I65.22 SYMPTOMATIC STENOSIS OF LEFT CAROTID ARTERY: ICD-10-CM

## 2024-08-19 DIAGNOSIS — J38.01 PARALYSIS OF LEFT VOCAL CORD: ICD-10-CM

## 2024-08-19 DIAGNOSIS — R05.9 COUGH, UNSPECIFIED TYPE: ICD-10-CM

## 2024-08-19 PROCEDURE — 93880 EXTRACRANIAL BILAT STUDY: CPT

## 2024-08-19 PROCEDURE — 71046 X-RAY EXAM CHEST 2 VIEWS: CPT

## 2024-08-19 PROCEDURE — 93880 EXTRACRANIAL BILAT STUDY: CPT | Performed by: SURGERY

## 2024-08-26 ENCOUNTER — TELEPHONE (OUTPATIENT)
Age: 69
End: 2024-08-26

## 2024-08-26 NOTE — TELEPHONE ENCOUNTER
Reviewed. No concerning findings on duplex. There is mild internal carotid artery stenosis on the right and moderate on the left, similar to past duplex. There is evidence of severe right external carotid artery stenosis which helps supply the face and does not contribute to stroke. There is no treatment indicated for this. Please arrange OV to review CV duplex more in-depth with patient.

## 2024-08-26 NOTE — TELEPHONE ENCOUNTER
"Patient's wife called regarding his recent CV done 8/19/24. She states she received his results through [employer] but would like to have communication regarding what the results mean and what the next steps are for him. She is concerned with the mention of \"severe stenosis\" in R carotid artery, as his vocal chords were paralyzed during his L CEA in 2021. Please review and advise.     Patient is working, please call his wife Renetta back with results communication. She states okay to LVM.   "

## 2025-01-04 ENCOUNTER — APPOINTMENT (OUTPATIENT)
Dept: LAB | Facility: HOSPITAL | Age: 70
End: 2025-01-04
Payer: COMMERCIAL

## 2025-01-04 DIAGNOSIS — I10 ESSENTIAL HYPERTENSION, MALIGNANT: ICD-10-CM

## 2025-01-04 DIAGNOSIS — E78.49 FAMILIAL COMBINED HYPERLIPIDEMIA: ICD-10-CM

## 2025-01-04 DIAGNOSIS — I25.10 ATHEROSCLEROSIS OF NATIVE CORONARY ARTERY WITHOUT ANGINA PECTORIS, UNSPECIFIED WHETHER NATIVE OR TRANSPLANTED HEART: ICD-10-CM

## 2025-01-04 DIAGNOSIS — I65.22 OCCLUSION OF LEFT CAROTID ARTERY: ICD-10-CM

## 2025-01-04 DIAGNOSIS — E11.8 DIABETIC COMPLICATION (HCC): ICD-10-CM

## 2025-01-04 LAB
ALBUMIN SERPL BCG-MCNC: 4.3 G/DL (ref 3.5–5)
ALP SERPL-CCNC: 56 U/L (ref 34–104)
ALT SERPL W P-5'-P-CCNC: 26 U/L (ref 7–52)
ANION GAP SERPL CALCULATED.3IONS-SCNC: 3 MMOL/L (ref 4–13)
AST SERPL W P-5'-P-CCNC: 21 U/L (ref 13–39)
BASOPHILS # BLD AUTO: 0.04 THOUSANDS/ΜL (ref 0–0.1)
BASOPHILS NFR BLD AUTO: 1 % (ref 0–1)
BILIRUB SERPL-MCNC: 0.56 MG/DL (ref 0.2–1)
BUN SERPL-MCNC: 23 MG/DL (ref 5–25)
CALCIUM SERPL-MCNC: 9.4 MG/DL (ref 8.4–10.2)
CHLORIDE SERPL-SCNC: 105 MMOL/L (ref 96–108)
CHOLEST SERPL-MCNC: 109 MG/DL (ref ?–200)
CO2 SERPL-SCNC: 30 MMOL/L (ref 21–32)
CREAT SERPL-MCNC: 1.3 MG/DL (ref 0.6–1.3)
CREAT UR-MCNC: 96.9 MG/DL
EOSINOPHIL # BLD AUTO: 0.23 THOUSAND/ΜL (ref 0–0.61)
EOSINOPHIL NFR BLD AUTO: 3 % (ref 0–6)
ERYTHROCYTE [DISTWIDTH] IN BLOOD BY AUTOMATED COUNT: 15.8 % (ref 11.6–15.1)
GFR SERPL CREATININE-BSD FRML MDRD: 55 ML/MIN/1.73SQ M
GLUCOSE P FAST SERPL-MCNC: 127 MG/DL (ref 65–99)
HCT VFR BLD AUTO: 44.3 % (ref 36.5–49.3)
HDLC SERPL-MCNC: 44 MG/DL
HGB BLD-MCNC: 13.9 G/DL (ref 12–17)
IMM GRANULOCYTES # BLD AUTO: 0.01 THOUSAND/UL (ref 0–0.2)
IMM GRANULOCYTES NFR BLD AUTO: 0 % (ref 0–2)
LDLC SERPL CALC-MCNC: 51 MG/DL (ref 0–100)
LYMPHOCYTES # BLD AUTO: 2.11 THOUSANDS/ΜL (ref 0.6–4.47)
LYMPHOCYTES NFR BLD AUTO: 28 % (ref 14–44)
MCH RBC QN AUTO: 26.6 PG (ref 26.8–34.3)
MCHC RBC AUTO-ENTMCNC: 31.4 G/DL (ref 31.4–37.4)
MCV RBC AUTO: 85 FL (ref 82–98)
MICROALBUMIN UR-MCNC: 8.9 MG/L
MICROALBUMIN/CREAT 24H UR: 9 MG/G CREATININE (ref 0–30)
MONOCYTES # BLD AUTO: 0.69 THOUSAND/ΜL (ref 0.17–1.22)
MONOCYTES NFR BLD AUTO: 9 % (ref 4–12)
NEUTROPHILS # BLD AUTO: 4.47 THOUSANDS/ΜL (ref 1.85–7.62)
NEUTS SEG NFR BLD AUTO: 59 % (ref 43–75)
NONHDLC SERPL-MCNC: 65 MG/DL
NRBC BLD AUTO-RTO: 0 /100 WBCS
PLATELET # BLD AUTO: 194 THOUSANDS/UL (ref 149–390)
PMV BLD AUTO: 12.3 FL (ref 8.9–12.7)
POTASSIUM SERPL-SCNC: 4.4 MMOL/L (ref 3.5–5.3)
PROT SERPL-MCNC: 6.8 G/DL (ref 6.4–8.4)
RBC # BLD AUTO: 5.22 MILLION/UL (ref 3.88–5.62)
SODIUM SERPL-SCNC: 138 MMOL/L (ref 135–147)
TRIGL SERPL-MCNC: 72 MG/DL (ref ?–150)
VIT B12 SERPL-MCNC: 205 PG/ML (ref 180–914)
WBC # BLD AUTO: 7.55 THOUSAND/UL (ref 4.31–10.16)

## 2025-01-04 PROCEDURE — 82043 UR ALBUMIN QUANTITATIVE: CPT

## 2025-01-04 PROCEDURE — 80061 LIPID PANEL: CPT

## 2025-01-04 PROCEDURE — 80053 COMPREHEN METABOLIC PANEL: CPT

## 2025-01-04 PROCEDURE — 36415 COLL VENOUS BLD VENIPUNCTURE: CPT

## 2025-01-04 PROCEDURE — 85025 COMPLETE CBC W/AUTO DIFF WBC: CPT

## 2025-01-04 PROCEDURE — 82570 ASSAY OF URINE CREATININE: CPT

## 2025-01-04 PROCEDURE — 82607 VITAMIN B-12: CPT

## 2025-02-01 ENCOUNTER — APPOINTMENT (OUTPATIENT)
Dept: LAB | Facility: HOSPITAL | Age: 70
End: 2025-02-01
Payer: COMMERCIAL

## 2025-02-01 DIAGNOSIS — R97.20 ELEVATED PROSTATE SPECIFIC ANTIGEN (PSA): ICD-10-CM

## 2025-02-01 LAB — PSA SERPL-MCNC: 7.96 NG/ML (ref 0–4)

## 2025-02-01 PROCEDURE — 36415 COLL VENOUS BLD VENIPUNCTURE: CPT

## 2025-02-01 PROCEDURE — G0103 PSA SCREENING: HCPCS

## 2025-03-04 ENCOUNTER — HOSPITAL ENCOUNTER (OUTPATIENT)
Dept: RADIOLOGY | Age: 70
Discharge: HOME | End: 2025-03-04
Payer: COMMERCIAL

## 2025-03-04 DIAGNOSIS — R97.20 ELEVATED PSA: ICD-10-CM

## 2025-03-04 PROCEDURE — 72197 MRI PELVIS W/O & W/DYE: CPT

## 2025-03-04 PROCEDURE — 76377 3D RENDER W/INTRP POSTPROCES: CPT

## 2025-03-04 PROCEDURE — A9585 GADOBUTROL INJECTION: HCPCS | Performed by: INTERNAL MEDICINE

## 2025-03-04 RX ORDER — GADOBUTROL 604.72 MG/ML
7 INJECTION INTRAVENOUS
Status: COMPLETED | OUTPATIENT
Start: 2025-03-04 | End: 2025-03-04

## 2025-03-04 RX ADMIN — GADOBUTROL 7 ML: 604.72 INJECTION INTRAVENOUS at 15:47

## 2025-04-02 ENCOUNTER — TELEPHONE (OUTPATIENT)
Dept: GASTROENTEROLOGY | Facility: CLINIC | Age: 70
End: 2025-04-02

## 2025-04-02 NOTE — TELEPHONE ENCOUNTER
Pt is due for an EGD with Dr Clark for hx of Emanuel's. Called and spoke to pt whom informed goes for prostate bx in May and would like to hold off and call us back until he knows what is going on.  If pt calls back, please schedule. Recall letter also mailed as reminder. Order in chart. Thank you!       OA Questions for EGD  Date: [  ]  Screened by: [  ]     Referring Provider: [ Dr Clark  ]     Pre-Screening: BMI [  ]    Past EGD? If yes - Date: [   ]  Physician/Facility: [   ]  Reason: [   ]     SCHEDULING STAFF: If the patient is over 75 years old, please schedule an office visit.  ·      Does the patient want to see a gastroenterologist prior to their procedure to discuss any GI symptoms? no  ·      Has the patient been hospitalized or had abdominal surgery in the past 6 months? no  ·      Does the patient use supplemental oxygen? no  ·      Does the patient take [Coumadin], [Lovenox], [Plavix], [Eliquis], [Xarelto], or other blood thinning medication? [Yes] [No] no    ·      Has the patient had a stroke, cardiac event, or stent placed in the past year? [Yes] [No] no     SCHEDULING STAFF: If patient answers NO to the above questions, then schedule the procedure. If patient answers YES to any of the above questions, then schedule an office appointment.  ·       If a repeat EGD is belated and patient declines procedure à notify provider.

## 2025-05-02 ENCOUNTER — OFFICE VISIT (OUTPATIENT)
Dept: VASCULAR SURGERY | Facility: CLINIC | Age: 70
End: 2025-05-02
Payer: COMMERCIAL

## 2025-05-02 VITALS
OXYGEN SATURATION: 99 % | WEIGHT: 168 LBS | HEIGHT: 69 IN | HEART RATE: 80 BPM | BODY MASS INDEX: 24.88 KG/M2 | SYSTOLIC BLOOD PRESSURE: 124 MMHG | DIASTOLIC BLOOD PRESSURE: 60 MMHG

## 2025-05-02 DIAGNOSIS — I65.22 SYMPTOMATIC STENOSIS OF LEFT CAROTID ARTERY: ICD-10-CM

## 2025-05-02 DIAGNOSIS — Z98.890 HISTORY OF CEA (CAROTID ENDARTERECTOMY): Primary | ICD-10-CM

## 2025-05-02 PROCEDURE — 99213 OFFICE O/P EST LOW 20 MIN: CPT | Performed by: SURGERY

## 2025-05-02 NOTE — PROGRESS NOTES
"Name: Long Cummings      : 1955      MRN: [id number]  Encounter Provider: Oliver Ramírez DO  Encounter Date: 2025   Encounter department: [facility]  :  Assessment & Plan  History of CEA (carotid endarterectomy)    Orders:    VAS carotid complete study; Future    Symptomatic stenosis of left carotid artery  69-year-old male presents for follow-up for carotid stenosis.  He had a symptomatic left ICA lesion and had an endarterectomy with Dr. Hernandez in .  He had complication of vocal cord paralysis after this.  His hoarseness did resolve but unfortunately he does have some chronic aspiration despite injection of his vocal cord.  He has had no further neurologic symptoms he is a non-smoker he is compliant with his aspirin and statin.    He had a carotid duplex that the most recent one was actually done last summer this demonstrated mild disease on the right and moderate stenosis on the left.  I reviewed the results of this with him and explained that no intervention is indicated at this time will continue routine surveillance we will plan to repeat his duplex in 6 months and schedule him for an office visit in 1 year.             History of Present Illness   HPI  Long Cummings is a 69 y.o. male     CV on 24. Denies TIA or CVA symptoms.     History obtained from: patient    Review of Systems   Eyes:  Negative for visual disturbance.   Neurological:  Negative for dizziness, facial asymmetry, speech difficulty, weakness and numbness.     I have reviewed the ROS above and made changes as needed.         Objective   /60 (BP Location: Left arm, Patient Position: Sitting)   Pulse 80   Ht 5' 9\" (1.753 m)   Wt 76.2 kg (168 lb)   SpO2 99%   BMI 24.81 kg/m²      Physical Exam    General  Exam: alert, awake, oriented, no distress, consistent with stated age    Integumentary  Exam: warm, dry, no gross lesions, no bruises and normal color    Head and Neck  Exam: supple, no " bruits, trachea midline, no JVD, no mass or palpable nodes  Healed left neck incision    Chest and Lung  Exam: chest normal without deformity, bilaterally expansive, clear to auscultation    Cardiovascular  Exam: regular rate, regular rhythm, no murmurs, no rubs or gallops    Adbomen  Exam: soft, non-tender, non-distended, no pulsatile abdominal masses, no abdominal bruit    Upper Extremity:  Palpation: Radial pulse- Bilateral 2+    Neurologic  Exam:alert, non-focal, oriented x 3        Administrative Statements   I have spent a total time of 20 minutes in caring for this patient on the day of the visit/encounter including Counseling / Coordination of care, Documenting in the medical record, and Reviewing/placing orders in the medical record (including tests, medications, and/or procedures).

## 2025-05-02 NOTE — ASSESSMENT & PLAN NOTE
69-year-old male presents for follow-up for carotid stenosis.  He had a symptomatic left ICA lesion and had an endarterectomy with Dr. Hernandez in 2021.  He had complication of vocal cord paralysis after this.  His hoarseness did resolve but unfortunately he does have some chronic aspiration despite injection of his vocal cord.  He has had no further neurologic symptoms he is a non-smoker he is compliant with his aspirin and statin.    He had a carotid duplex that the most recent one was actually done last summer this demonstrated mild disease on the right and moderate stenosis on the left.  I reviewed the results of this with him and explained that no intervention is indicated at this time will continue routine surveillance we will plan to repeat his duplex in 6 months and schedule him for an office visit in 1 year.

## 2025-05-05 ENCOUNTER — TELEPHONE (OUTPATIENT)
Dept: VASCULAR SURGERY | Facility: CLINIC | Age: 70
End: 2025-05-05

## 2025-05-05 NOTE — TELEPHONE ENCOUNTER
Received fax from [facility] - Dr. Baugh - requesting to hold Aspirin prior to a Prostate biopsy on 5/6/25.  They are requesting instructions fro Holding Aspirin.      Form Attached.    Will send to Vascular Triage to review and advise.

## 2025-05-05 NOTE — TELEPHONE ENCOUNTER
Reviewed. Patient may hold aspirin 7 days prior to prostate biopsy and resume after when deemed appropriate.

## 2025-08-16 ENCOUNTER — HOSPITAL ENCOUNTER (EMERGENCY)
Facility: HOSPITAL | Age: 70
Discharge: HOME | End: 2025-08-16
Payer: COMMERCIAL

## 2025-08-16 VITALS
RESPIRATION RATE: 18 BRPM | SYSTOLIC BLOOD PRESSURE: 161 MMHG | OXYGEN SATURATION: 98 % | HEART RATE: 88 BPM | DIASTOLIC BLOOD PRESSURE: 74 MMHG

## 2025-08-16 DIAGNOSIS — R04.0 EPISTAXIS: Primary | ICD-10-CM

## 2025-08-16 PROCEDURE — 99284 EMERGENCY DEPT VISIT MOD MDM: CPT

## 2025-08-16 PROCEDURE — 99282 EMERGENCY DEPT VISIT SF MDM: CPT

## 2025-08-16 RX ORDER — OXYMETAZOLINE HYDROCHLORIDE 0.05 G/100ML
2 SPRAY NASAL ONCE
Status: COMPLETED | OUTPATIENT
Start: 2025-08-16 | End: 2025-08-16

## 2025-08-16 RX ADMIN — OXYMETAZOLINE HYDROCHLORIDE 2 SPRAY: 0.5 SPRAY NASAL at 08:26

## 2025-08-16 RX ADMIN — SILVER NITRATE APPLICATORS 1 APPLICATOR: 25; 75 STICK TOPICAL at 09:09

## 2025-08-18 ENCOUNTER — TELEPHONE (OUTPATIENT)
Age: 70
End: 2025-08-18

## (undated) DEVICE — GLOVE INDICATOR PI UNDERGLOVE SZ 6.5 BLUE

## (undated) DEVICE — 3M™ TEGADERM™ TRANSPARENT FILM DRESSING FRAME STYLE, 1626W, 4 IN X 4-3/4 IN (10 CM X 12 CM), 50/CT 4CT/CASE: Brand: 3M™ TEGADERM™

## (undated) DEVICE — DRAPE SHEET THREE QUARTER

## (undated) DEVICE — LABEL STERILE (RSC) (2-SENSOR CAINE 2-LIDOCAINE 2-HEPARIN)

## (undated) DEVICE — PACK GENERAL LF

## (undated) DEVICE — GLOVE SRG BIOGEL 6.5

## (undated) DEVICE — NEEDLE 25GA X 1 IN SAFETY GLIDE

## (undated) DEVICE — SUT PROLENE 5-0 C-1/C-1 24 IN 8725H

## (undated) DEVICE — LIGACLIP MCA MULTIPLE CLIP APPLIERS, 20 SMALL CLIPS: Brand: LIGACLIP

## (undated) DEVICE — STOCKINETTE REGULAR

## (undated) DEVICE — SUT SILK 2-0 18 IN A185H

## (undated) DEVICE — LABEL MEDICATION STERILE 2 YELLOW LIDOCAINE 2 BLUE MARCAINE 2 ORANGE HEPARIN

## (undated) DEVICE — ACE WRAP 2 IN UNSTERILE

## (undated) DEVICE — BAG DECANTER

## (undated) DEVICE — FIBERTAPE 2MM X 7IN AR-7237-7

## (undated) DEVICE — GLOVE EXAM NON-STRL NTRL PLUS LRG PF

## (undated) DEVICE — NEEDLE 25G X 1 1/2

## (undated) DEVICE — POSITIONER TRIMANO LIMB BEACH CHAIR

## (undated) DEVICE — TUBING SUCTION 5MM X 12 FT

## (undated) DEVICE — SOLIDIFIER FLUID WASTE CONTROL 1500ML

## (undated) DEVICE — GAUZE SPONGES,16 PLY: Brand: CURITY

## (undated) DEVICE — GLOVE SRG BIOGEL 7.5

## (undated) DEVICE — CANNULA 5.75 X 70MM BARREL SHAPED BOWL

## (undated) DEVICE — BASIC DOUBLE BASIN 2-LF: Brand: MEDLINE INDUSTRIES, INC.

## (undated) DEVICE — PACK ARTHROSCOPY

## (undated) DEVICE — SUT POLYESTER TAPE D-G 8618-00

## (undated) DEVICE — "MH-443 SUCTION VALVE F/EVIS140 EVIS160": Brand: SUCTION VALVE

## (undated) DEVICE — TUBING ARTHROSCOPIC WAVE  MAIN PUMP

## (undated) DEVICE — CUFF TOURNIQUET 18 X 4 IN QUICK CONNECT DISP 1 BLADDER

## (undated) DEVICE — BANDAGE, ESMARK LF STR 4"X9'(20/CS): Brand: CYPRESS

## (undated) DEVICE — BANDAGE, ESMARK LF STR 6"X9' (20/CS): Brand: CYPRESS

## (undated) DEVICE — TUBING AUX CHANNEL

## (undated) DEVICE — "MAJ-901 WATER CONTAINER SET CV-160/140": Brand: WATER CONTAINER

## (undated) DEVICE — SUT PROLENE 6-0 BV130 30 IN 8709H

## (undated) DEVICE — MONITORING SPINAL IMPULSE CASE FEE

## (undated) DEVICE — Device

## (undated) DEVICE — STRETCH BANDAGE: Brand: CURITY

## (undated) DEVICE — SYRINGE 50ML LL

## (undated) DEVICE — DRESSING MEPILEX AG BORDER 4 X 4 IN

## (undated) DEVICE — DUAL SPIKE ADAPTER: Brand: CONMED

## (undated) DEVICE — SUPPLY FEE STD

## (undated) DEVICE — "MH-438 A/W VLVE F/140 EVIS-140": Brand: AIR/WATER VALVE

## (undated) DEVICE — EXTREMITY DRAPE W/ARMBOARD COVERS: Brand: CONVERTORS

## (undated) DEVICE — "MB-142 MOUTHPIECE": Brand: MOUTHPIECE

## (undated) DEVICE — OCCLUSIVE GAUZE STRIP,3% BISMUTH TRIBROMOPHENATE IN PETROLATUM BLEND: Brand: XEROFORM

## (undated) DEVICE — CANNULA BUTTON 8 X 30MM PASSPORT

## (undated) DEVICE — SUT ETHILON 4-0 PS-2 18 IN 1667G

## (undated) DEVICE — BRUSH ENDO CLEANING DBL-HEADER

## (undated) DEVICE — CHLORAPREP HI-LITE 26ML ORANGE

## (undated) DEVICE — SINGLE-USE BIOPSY FORCEPS: Brand: RADIAL JAW 4

## (undated) DEVICE — INTENDED FOR TISSUE SEPARATION, AND OTHER PROCEDURES THAT REQUIRE A SHARP SURGICAL BLADE TO PUNCTURE OR CUT.: Brand: BARD-PARKER SAFETY BLADES SIZE 11, STERILE

## (undated) DEVICE — MEDI-VAC YANK SUCT HNDL W/TPRD BULBOUS TIP: Brand: CARDINAL HEALTH

## (undated) DEVICE — NEEDLE SUT SCORPION MULTIFIRE

## (undated) DEVICE — TIBURON BEACH CHAIR SHOULDER DRAPE: Brand: CONVERTORS

## (undated) DEVICE — 3M™ IOBAN™ 2 ANTIMICROBIAL INCISE DRAPE 6640EZ: Brand: IOBAN™ 2

## (undated) DEVICE — GLOVE SRG BIOGEL 8

## (undated) DEVICE — MEDI-VAC YANKAUER SUCTION HANDLE: Brand: CARDINAL HEALTH

## (undated) DEVICE — SUT SILK 3-0 18 IN A184H

## (undated) DEVICE — SURGIFOAM 8.5 X 12.5

## (undated) DEVICE — LIGACLIP MCA MULTIPLE CLIP APPLIERS, 20 MEDIUM CLIPS: Brand: LIGACLIP

## (undated) DEVICE — PROXIMATE PLUS MD MULTI-DIRECTIONAL RELEASE SKIN STAPLERS CONTAINS 35 STAINLESS STEEL STAPLES APPROXIMATE CLOSED DIMENSIONS: 6.9MM X 3.9MM WIDE: Brand: PROXIMATE

## (undated) DEVICE — ASTOUND STANDARD SURGICAL GOWN, XL: Brand: CONVERTORS

## (undated) DEVICE — HEMOCLIP CARTRIDGE MED

## (undated) DEVICE — SURGICEL 4 X 8

## (undated) DEVICE — SYRINGE 10ML LL CONTROL TOP

## (undated) DEVICE — PLEDGET CARDIO PTFE 9.5 X 4.8 SOFT LF (6EA/PK)

## (undated) DEVICE — DRAPE PROBE NEO-PROBE/ULTRASOUND

## (undated) DEVICE — SINGLE-USE POLYPECTOMY SNARE: Brand: SENSATION SHORT THROW

## (undated) DEVICE — SCD SEQUENTIAL COMPRESSION COMFORT SLEEVE MEDIUM KNEE LENGTH: Brand: KENDALL SCD

## (undated) DEVICE — 3000CC GUARDIAN II: Brand: GUARDIAN

## (undated) DEVICE — PETRI DISH STERILE

## (undated) DEVICE — KERLIX BANDAGE ROLL: Brand: KERLIX

## (undated) DEVICE — INTENDED FOR TISSUE SEPARATION, AND OTHER PROCEDURES THAT REQUIRE A SHARP SURGICAL BLADE TO PUNCTURE OR CUT.: Brand: BARD-PARKER SAFETY BLADES SIZE 15, STERILE

## (undated) DEVICE — SUT MONOCRYL 4-0 PS-2 18 IN Y496G

## (undated) DEVICE — ADHESIVE SKIN HIGH VISCOSITY EXOFIN 1ML

## (undated) DEVICE — TIBURON HAND DRAPE: Brand: CONVERTORS

## (undated) DEVICE — BLADE SHAVER EXCALIBUR 4MM 13CM COOLCUT

## (undated) DEVICE — ANCHOR SUT 5.5 X 19.1MM CLS EYELET SWIVELOCK
Type: IMPLANTABLE DEVICE | Status: NON-FUNCTIONAL
Removed: 2018-08-23

## (undated) DEVICE — TIBURON SPLIT SHEET: Brand: CONVERTORS

## (undated) DEVICE — PAD GROUNDING ADULT

## (undated) DEVICE — 1820 FOAM BLOCK NEEDLE COUNTER: Brand: DEVON

## (undated) DEVICE — NEEDLE 18 G X 1 1/2 SAFETY

## (undated) DEVICE — NON-STERILE REUSABLE TOURNIQUET CUFF SINGLE BLADDER, DUAL PORT AND QUICK CONNECT CONNECTOR: Brand: COLOR CUFF

## (undated) DEVICE — BURR  OVAL 4MM 13CM 8 FLUTE COOLCUT

## (undated) DEVICE — SURGICEL FIBRILLAR 1 X 2

## (undated) DEVICE — GLOVE INDICATOR PI UNDERGLOVE SZ 7.5 BLUE

## (undated) DEVICE — TRAP POLY

## (undated) DEVICE — TUBING BUBBLE CLEAR 5MM X 100 FT NS

## (undated) DEVICE — SPONGE GAUZE 4 X 9

## (undated) DEVICE — COTTON TIP APPLICTOR 2 PK

## (undated) DEVICE — CATH STRAIGHT RED RUBBER 20 FR

## (undated) DEVICE — SUT PROLENE 7-0 BV175-6 24 IN M8737

## (undated) DEVICE — FABRIC REINFORCED, SURGICAL GOWN, XL: Brand: CONVERTORS

## (undated) DEVICE — LUBRICANT SURGILUBE TUBE 4 OZ  FLIP TOP

## (undated) DEVICE — 1200CC GUARDIAN II: Brand: GUARDIAN

## (undated) DEVICE — BITE BLOCK ENDO 60FR ADLT MAXI  DISP W/STRAP

## (undated) DEVICE — DRAPE SURGIKIT SADDLE BAG

## (undated) DEVICE — BETHL CAROTID ENDARTERECTOMY: Brand: CARDINAL HEALTH

## (undated) DEVICE — DISPOSABLE BIOPSY VALVE MAJ-1555: Brand: SINGLE USE BIOPSY VALVE (STERILE)

## (undated) DEVICE — DRAPE SHEET X-LG

## (undated) DEVICE — INTENDED FOR TISSUE SEPARATION, AND OTHER PROCEDURES THAT REQUIRE A SHARP SURGICAL BLADE TO PUNCTURE OR CUT.: Brand: BARD-PARKER ® CARBON RIB-BACK BLADES

## (undated) DEVICE — VAPR COOLPULSE 90 ELECTRODE WITH HAND CONTROLS 90 DEGREES SUCTION WITH INTEGRATED HANDPIECE: Brand: VAPR COOLPULSE

## (undated) DEVICE — AIRLIFE™  ADULT CUSHION NASAL CANNULA WITH 7 FOOT (2.1 M) CRUSH-RESISTANT OXYGEN TUBING, AND U/CONNECT-IT ADAPTER: Brand: AIRLIFE™

## (undated) DEVICE — SUT MONOCRYL 3-0 SH 27 IN Y416H

## (undated) DEVICE — ASTOUND IMPERVIOUS SURGICAL GOWN: Brand: CONVERTORS